# Patient Record
Sex: MALE | Race: WHITE
[De-identification: names, ages, dates, MRNs, and addresses within clinical notes are randomized per-mention and may not be internally consistent; named-entity substitution may affect disease eponyms.]

---

## 2018-08-10 ENCOUNTER — HOSPITAL ENCOUNTER (OUTPATIENT)
Dept: HOSPITAL 88 - ER | Age: 73
Setting detail: OBSERVATION
LOS: 3 days | Discharge: HOME | End: 2018-08-13
Attending: INTERNAL MEDICINE | Admitting: INTERNAL MEDICINE
Payer: MEDICARE

## 2018-08-10 VITALS — SYSTOLIC BLOOD PRESSURE: 159 MMHG | DIASTOLIC BLOOD PRESSURE: 70 MMHG

## 2018-08-10 VITALS — DIASTOLIC BLOOD PRESSURE: 70 MMHG | SYSTOLIC BLOOD PRESSURE: 159 MMHG

## 2018-08-10 VITALS — DIASTOLIC BLOOD PRESSURE: 71 MMHG | SYSTOLIC BLOOD PRESSURE: 164 MMHG

## 2018-08-10 VITALS — HEIGHT: 70 IN | BODY MASS INDEX: 2.29 KG/M2 | WEIGHT: 16 LBS

## 2018-08-10 DIAGNOSIS — Z85.3: ICD-10-CM

## 2018-08-10 DIAGNOSIS — E78.5: ICD-10-CM

## 2018-08-10 DIAGNOSIS — I25.10: ICD-10-CM

## 2018-08-10 DIAGNOSIS — I10: ICD-10-CM

## 2018-08-10 DIAGNOSIS — M51.36: ICD-10-CM

## 2018-08-10 DIAGNOSIS — R00.1: ICD-10-CM

## 2018-08-10 DIAGNOSIS — R94.31: ICD-10-CM

## 2018-08-10 DIAGNOSIS — Z85.118: ICD-10-CM

## 2018-08-10 DIAGNOSIS — R07.89: Primary | ICD-10-CM

## 2018-08-10 DIAGNOSIS — Z86.73: ICD-10-CM

## 2018-08-10 DIAGNOSIS — R53.1: ICD-10-CM

## 2018-08-10 DIAGNOSIS — R53.81: ICD-10-CM

## 2018-08-10 LAB
ALBUMIN SERPL-MCNC: 3.4 G/DL (ref 3.5–5)
ALBUMIN/GLOB SERPL: 1 {RATIO} (ref 0.8–2)
ALP SERPL-CCNC: 63 IU/L (ref 40–150)
ALT SERPL-CCNC: 18 IU/L (ref 0–55)
ANION GAP SERPL CALC-SCNC: 13.6 MMOL/L (ref 8–16)
BACTERIA URNS QL MICRO: (no result) /HPF
BASOPHILS # BLD AUTO: 0.1 10*3/UL (ref 0–0.1)
BASOPHILS NFR BLD AUTO: 1 % (ref 0–1)
BILIRUB UR QL: NEGATIVE
BUN SERPL-MCNC: 14 MG/DL (ref 7–26)
BUN/CREAT SERPL: 16 (ref 6–25)
CALCIUM SERPL-MCNC: 9.3 MG/DL (ref 8.4–10.2)
CHLORIDE SERPL-SCNC: 109 MMOL/L (ref 98–107)
CHOLEST SERPL-MCNC: 146 MD/DL (ref 0–199)
CHOLEST/HDLC SERPL: 2.5 {RATIO} (ref 3.9–4.7)
CK MB SERPL-MCNC: 1.7 NG/ML (ref 0–5)
CK MB SERPL-MCNC: 2 NG/ML (ref 0–5)
CK SERPL-CCNC: 63 IU/L (ref 30–200)
CK SERPL-CCNC: 77 IU/L (ref 30–200)
CLARITY UR: CLEAR
CO2 SERPL-SCNC: 23 MMOL/L (ref 22–29)
COLOR UR: YELLOW
DEPRECATED APTT PLAS QN: 29.4 SECONDS (ref 23.8–35.5)
DEPRECATED INR PLAS: 1.3
DEPRECATED NEUTROPHILS # BLD AUTO: 3.7 10*3/UL (ref 2.1–6.9)
DEPRECATED RBC URNS MANUAL-ACNC: (no result) /HPF (ref 0–5)
EGFRCR SERPLBLD CKD-EPI 2021: > 60 ML/MIN (ref 60–?)
EOSINOPHIL # BLD AUTO: 0.7 10*3/UL (ref 0–0.4)
EOSINOPHIL NFR BLD AUTO: 10.8 % (ref 0–6)
EPI CELLS URNS QL MICRO: (no result) /LPF
ERYTHROCYTE [DISTWIDTH] IN CORD BLOOD: 12.7 % (ref 11.7–14.4)
GLOBULIN PLAS-MCNC: 3.5 G/DL (ref 2.3–3.5)
GLUCOSE SERPLBLD-MCNC: 116 MG/DL (ref 74–118)
HCT VFR BLD AUTO: 36.3 % (ref 38.2–49.6)
HDLC SERPL-MSCNC: 58 MG/DL (ref 40–60)
HGB BLD-MCNC: 12.1 G/DL (ref 14–18)
KETONES UR QL STRIP.AUTO: NEGATIVE
LDLC SERPL CALC-MCNC: 72 MG/DL (ref 60–130)
LEUKOCYTE ESTERASE UR QL STRIP.AUTO: NEGATIVE
LYMPHOCYTES # BLD: 1.1 10*3/UL (ref 1–3.2)
LYMPHOCYTES NFR BLD AUTO: 18.4 % (ref 18–39.1)
MCH RBC QN AUTO: 32.1 PG (ref 28–32)
MCHC RBC AUTO-ENTMCNC: 33.3 G/DL (ref 31–35)
MCV RBC AUTO: 96.3 FL (ref 81–99)
MONOCYTES # BLD AUTO: 0.6 10*3/UL (ref 0.2–0.8)
MONOCYTES NFR BLD AUTO: 10.2 % (ref 4.4–11.3)
NEUTS SEG NFR BLD AUTO: 59.4 % (ref 38.7–80)
NITRITE UR QL STRIP.AUTO: NEGATIVE
PLATELET # BLD AUTO: 227 X10E3/UL (ref 140–360)
POTASSIUM SERPL-SCNC: 3.6 MMOL/L (ref 3.5–5.1)
PROT UR QL STRIP.AUTO: NEGATIVE
PROTHROMBIN TIME: 15.2 SECONDS (ref 11.9–14.5)
RBC # BLD AUTO: 3.77 X10E6/UL (ref 4.3–5.7)
SODIUM SERPL-SCNC: 142 MMOL/L (ref 136–145)
SP GR UR STRIP: 1.02 (ref 1.01–1.02)
TRIGL SERPL-MCNC: 82 MG/DL (ref 0–149)
TSH SERPL DL<=0.005 MIU/L-ACNC: 1.48 UIU/ML (ref 0.35–4.94)
UROBILINOGEN UR STRIP-MCNC: 0.2 MG/DL (ref 0.2–1)
WBC #/AREA URNS HPF: (no result) /HPF (ref 0–5)

## 2018-08-10 PROCEDURE — 99284 EMERGENCY DEPT VISIT MOD MDM: CPT

## 2018-08-10 PROCEDURE — 80053 COMPREHEN METABOLIC PANEL: CPT

## 2018-08-10 PROCEDURE — 72110 X-RAY EXAM L-2 SPINE 4/>VWS: CPT

## 2018-08-10 PROCEDURE — 81001 URINALYSIS AUTO W/SCOPE: CPT

## 2018-08-10 PROCEDURE — 71260 CT THORAX DX C+: CPT

## 2018-08-10 PROCEDURE — 82553 CREATINE MB FRACTION: CPT

## 2018-08-10 PROCEDURE — 84443 ASSAY THYROID STIM HORMONE: CPT

## 2018-08-10 PROCEDURE — 84484 ASSAY OF TROPONIN QUANT: CPT

## 2018-08-10 PROCEDURE — 80061 LIPID PANEL: CPT

## 2018-08-10 PROCEDURE — 93306 TTE W/DOPPLER COMPLETE: CPT

## 2018-08-10 PROCEDURE — 36415 COLL VENOUS BLD VENIPUNCTURE: CPT

## 2018-08-10 PROCEDURE — 93005 ELECTROCARDIOGRAM TRACING: CPT

## 2018-08-10 PROCEDURE — 71045 X-RAY EXAM CHEST 1 VIEW: CPT

## 2018-08-10 PROCEDURE — 85730 THROMBOPLASTIN TIME PARTIAL: CPT

## 2018-08-10 PROCEDURE — 85025 COMPLETE CBC W/AUTO DIFF WBC: CPT

## 2018-08-10 PROCEDURE — 85610 PROTHROMBIN TIME: CPT

## 2018-08-10 PROCEDURE — 70450 CT HEAD/BRAIN W/O DYE: CPT

## 2018-08-10 PROCEDURE — 82550 ASSAY OF CK (CPK): CPT

## 2018-08-10 RX ADMIN — GEMFIBROZIL SCH MG: 600 TABLET, FILM COATED ORAL at 16:46

## 2018-08-10 NOTE — DIAGNOSTIC IMAGING REPORT
EXAMINATION: Head CT 



HISTORY: Generalized weakness since the day before, chest and lower back pain,

history of breast cancer and lung cancer.

COMPARISON: Brain MRI on 12/23/2013

TECHNIQUE: Multidetector axial images were obtained without contrast from the

foramen magnum to the vertex . The images were reconstructed using brain and

bone algorithms.  Thin section brain images were reformatted into coronal and

sagittal planes.

Intravenous contrast: None. 

Image quality: Motion/streaking artifact limits the evaluation of the skull

base and posterior cranial fossa.



FINDINGS:



     Parenchyma: 

1.  Stable mild chronic microvascular ischemic changes.

2.  Small chronic lacunar infarct in the left caudate nucleus was not seen on

prior MRI in 12/23/2019.

3.  No mass or hemorrhage. No CT evidence of acute territorial vascular insult.



    

     Extra-axial spaces:No abnormal density. No extra-axial fluid collections 

     Brain volume: Normal for age. 

     Ventricles: No hydrocephalus or displacement.  

     Arteries: No density suggestive of thrombus. 

     Dural sinuses: No abnormal density. 

     Extra-axial spaces: No abnormal density. 

     Foramen magnum: No mass, Chiari malformation, or basilar invagination. 

     Sella: No obvious mass.  

     Paranasal/mastoid sinuses: Imaged portions unremarkable. 

     Skull/Scalp: No lytic or blastic lesions.  No fractures. 



IMPRESSION:



1.  No intracranial mass, hemorrhage vasogenic edema or mass effect.

2.  Persistent minimal chronic microvascular ischemic changes.

3.  Small chronic lacunar infarct in the left basal ganglia that was not seen

on prior MRI dated 12/23/2015.



Signed by: Dr. Sunita Herrera M.D. on 8/10/2018 11:11 AM

## 2018-08-10 NOTE — CONSULTATION
DATE OF CONSULTATION:  August 10, 2018



REQUESTING PHYSICIAN:  Dr. Barry Xiao.



HISTORY OF PRESENT ILLNESS:  Mr. Haywood is a 72-year-old white male who 

presented to the ER with history of tightness of the chest, pain in the 

back, subsequently admitted for further evaluation and treatment, history 

of past illness, history of breast cancer, for which he had surgery and 

history of inoperable metastatic adenocarcinoma of the lung, alveolar type. 

 The patient was treated with systemic chemotherapy consisting of Taxotere, 

cisplatinum approximately 3 years back.  The patient subsequently has been 

maintained on Tarceva .  The patient has done remarkably well now for 

approximately 3 to 4 years.



SOCIAL HISTORY:  History of smoking.



FAMILY HISTORY:  Noncontributory.



ALLERGIES:  Reported none.



MEDICATIONS:  Please review the EMR.



REVIEW OF SYSTEMS

HEENT:  Normal. 

CARDIAC:  History of chest pain. 

RESPIRATORY:  Inoperable multicentric lung cancer.

GI:  Normal.

:  Normal.

MUSCULOSKELETAL:  Normal. 

SKIN AND BREASTS:  History of breast cancer.



PHYSICAL EXAMINATION

GENERAL:  Moderately built male.

HEENT:  No palpable adenopathy. 

HEART:  Within normal limits. 

LUNGS:  Clear.

BREASTS:  Right breast missing.  Left breast does not reveal any masses. 

ABDOMEN:  Obese. 

RECTAL:  Deferred. 

CENTRAL NERVOUS SYSTEM:  Essentially normal.



LABORATORY DATA :  Lab investigations of interest showing hemoglobin of 

12.1, hematocrit 36.3.  White count 6100, platelets reported at 227,000.  

Chemistry shows a sodium of 142, potassium 3.6, chloride 99, CO2 23, BUN 

14, creatinine 0.8, calcium 9.3.  Bilirubin 1.1, SGOT 25, SGPT 18, alkaline 

phosphatase 63.  Total protein 6.9, albumin 3.4.  Globulin 3.5.  TSH 1.48.



IMAGING:  Consists of an x-ray of the lumbar spine, which was reported 

essentially normal except for multilevel moderate degenerative disk at 

L5-S1, L4-L5, L3-L4.



Chest x-ray is reported essentially normal; however, it has to be known 

that the patient did have lung cancer.  A CAT scan perhaps will be much 

more.  CT of the brain was done, which showed the patient to have small 

chronic lacunar infarct on the left caudate nucleus.  There is a typo here, 

it says 12/23/2019, I will alert Dr. Sunita Herrera to change this; however, 

down the row she did date it as 12/23/2015.



IMPRESSION

1. Multicentric lung cancer, clinically in complete remission.  

Pathologically unknown.

2. History of breast cancer.

3. Chest pains.





PLAN:  Is to have the cardiology, pulmonology consultation.  I will confine 

myself to hematology perhaps a CT of the chest to assess the extent of the 

disease would be indicated.



Thank you very much for allowing me to participate in the management of 

this patient.  I will confine myself to hematology oncology.









DD:  08/10/2018 15:16

DT:  08/10/2018 17:30

Job#:  [id number]

## 2018-08-10 NOTE — DIAGNOSTIC IMAGING REPORT
EXAMINATION: Lumbar spine series.



CLINICAL HISTORY: Chest pain, low back pain



COMPARISON:  None.  



DISCUSSION:  5 views of the lumbar spine are submitted for interpretation. 

Five nonrib-bearing lumbar type vertebral bodies are identified. No acute,

displaced fractures or subluxation.  Multilevel moderate degenerative disc

changes in the lumbosacral spine with associated levoscoliosis, worse at L3-L4,

L5-S1. Facet hypertrophy L4-L5 and L5-S1. Grade 1 anterolisthesis of L4 on L3. 

Vertebral body heights are preserved. Moderate intervertebral disc space

narrowing with vacuum phenomenon at L3-L4 and L5-S1. Bilateral oblique views

show no spondylolysis. Degenerative changes in bilateral sacroiliac joints.

Marked atherosclerotic calcification of the abdominal aorta.

Ill-defined somewhat linear calcific densities projecting in the left upper

quadrant left 12th rib as well as right upper quadrant over the L1 right

transverse process are likely vascular in nature.



IMPRESSION: 



1. No acute abnormalities. Multilevel degenerative disc and joint disease with

associated levoscoliosis





The staff physician below has personally reviewed this exam on the date of

dictation.











Signed by: Dr. Clifton Lauren M.D. on 8/10/2018 12:34 PM

## 2018-08-10 NOTE — DIAGNOSTIC IMAGING REPORT
Examination: Single AP view of the chest.



COMPARISON: Chest 2 views 12/23/2013



INDICATION: Chest pain

    

IMPRESSION: Exam limited by patient rotation.

 

1.  Lines and Tubes: None

2.  Lungs are well-inflated. No nodules, masses or consolidation. Linear

opacity in the left lower lung, which may represent subsegmental atelectasis or

scarring.

3.  Cardiomediastinal silhouette is normal.   Pulmonary vasculature is normal.

4.  Metallic clips project over the right axillary region. No acute bony

abnormalities.



Signed by: Dr. Clifton Lauren M.D. on 8/10/2018 12:40 PM

## 2018-08-10 NOTE — CONSULTATION
DATE OF CONSULTATION:  August 10, 2018 



REASON FOR CONSULTATION:  Chest pain.



HISTORY:  This is a 72-year-old male who has a history of hypertension, 

hyperlipidemia, history of lung cancer and breast cancer, being maintained 

on Tarceva.  Patient presents to [facility] ER with complaints 

of chest pain that started about 3:30 this morning.  Therefore cardiology 

was consulted.  Patient seen in ER, room 10, with family member at bedside. 

 Reports he has been having some chest discomfort last night.  Reports he 

has chest tightness across his chest with lower back pain.  Denies any 

other symptoms with discomfort. Reports chest tightness is constant and 

reproducible with palpation.  Currently in no acute distress however does 

complain of bilateral chest wall pain with palpation. 



PAST MEDICAL HISTORY:  Hypertension, hyperlipidemia, history of lung 

cancer.  Reports chemotherapy times four and is on Tarceva.  Also history 

of right breast cancer.



SURGICAL HISTORY:  Right breast removal also with lymph node dissection.  

Also reports left inguinal hernia repair and also bilateral cataract 

surgery.



SOCIAL HISTORY:  He is a retired machine shop owner.  Denies any alcohol 

use.  Denies any tobacco use.



FAMILY HISTORY:  Reports sister apparently  from stroke.  Also 

father  from stroke.  Mother  from old age however does 

report did have heart surgery in the past but he does not know any details.



HOME MEDICATIONS:  Include verapamil 240 mg once a day, Tarceva 150 mg once 

a day, gemfibrozil 600 mg b.i.d., metoprolol 50 mg daily.



ALLERGIES:  NO KNOWN ALLERGIES.

REVIEW OF SYSTEMS

GENERAL:  Denies any weight changes.  Positive for fatigue and weakness.  

Denies any fever or chills.  Denies sweats.

SKIN:  No rashes or bruises noted.

HEENT:  Denies any headaches, any trauma, any nausea or vomiting, vision 

changes, any blurred vision, double vision, tinnitus, vertigo, earaches, 

rhinorrhea, stuffiness, sneezing, epistaxis, sore throat, hoarseness, 

swollen neck.

CARDIAC:  Chest tightness as above.  Denies any palpitations.  Positive for 

dyspnea on exertion.  Denies any orthopnea or PND.  Positive for lower 

extremity edema.

RESPIRATORY:  Positive for some shortness of breath on exertion.  Positive 

for cough.  Denies any hemoptysis.

GI:  Poor appetite reported.  No nausea or vomiting.  Denies any diarrhea 

or constipation, any hematemesis, melena or hematochezia, abdominal pain.

URINARY:  Positive for frequency, urgency.  Denies any hematuria.

VASCULAR:  Positive for lower extremity edema.

MUSCULOSKELETAL:  Positive for generalized muscle weakness, joint pains, 

and positive for low back pain.

HEMATOLOGY:  Denies any anemia, easy bruising, or bleeding.

ENDOCRINE:  Denies any heat or cold intolerance, polyuria, polydipsia or 

polyphagia.



PHYSICAL EXAMINATION

VITALS SIGNS:  Height 70 inches, weight 166 pounds.  Temperature 97.9, 

pulse 53, respiratory rate 20, blood pressure 145/91, pulse ox 100% on room 

air.

SKIN:  No rashes or bruises.

HEENT:  Normocephalic.  Extraocular motor intact.  Pupils equal and 

reactive.  Oral mucosa pink.

NECK:  No JVD.  No thyromegaly noted.

HEART:  Regular rate.  A soft systolic murmur heard in the right upper 

sternal border.

LUNGS:  Bilateral breath sounds.  Clear to auscultation.  Does have a scar 

under the right breast.

ABDOMEN:  Soft, nontender, nondistended.  No organomegaly noted.

VASCULAR:  +2 bilateral radial pulses, +1 DP/PT pulses.

MUSCULOSKELETAL:  +1 bilateral lower extremity edema, left greater than 

right.



LABS:  Sodium 42, potassium 3.9, chloride 109, BUN 14, creatinine 0.8.  

Troponin 0.009.  TSH 1.4.  White count 6, hemoglobin 12, hematocrit 36, 

platelets 227.



Lumbar spine showing degenerative disk disease.



Chest x-ray:  No acute abnormalities.



CT of the brain showing small chronic lacunar infarct.



EKG:  Sinus yun with PACs with nonspecific ST changes.



ASSESSMENT AND PLAN 

1. Chest pain.

2. Hypertension.

3. Hyperlipidemia.

4. History of lung cancer being maintained on Tarceva.

5. History of breast cancer.



PLAN

1. Patient presents with chest tightness reproducible with palpation.  

Negative cardiac enzymes thus far.  Will go ahead and cycle enzymes and 

continue to monitor patient's symptoms.  Will go ahead and start patient 

on aspirin.  Go ahead and start patient's gemfibrozil.  Will hold beta 

blocker and calcium channel blocker for now since patient is 

bradycardic.

2. We will obtain echo to evaluate heart function and structure.

3. Continue telemonitoring.





Thank you very much for this consult.  Will follow patient and adjust 

cardiac therapy as course dictates.

Seen and examined

Differential diagnoses are discussed and explained

Agree with note

Dictated by:  Chilo Gtz NP.





DD:  08/10/2018 15:38

DT:  08/10/2018 16:51

Job#:  [id number]







SUE

## 2018-08-11 VITALS — SYSTOLIC BLOOD PRESSURE: 162 MMHG | DIASTOLIC BLOOD PRESSURE: 70 MMHG

## 2018-08-11 VITALS — SYSTOLIC BLOOD PRESSURE: 160 MMHG | DIASTOLIC BLOOD PRESSURE: 68 MMHG

## 2018-08-11 VITALS — SYSTOLIC BLOOD PRESSURE: 136 MMHG | DIASTOLIC BLOOD PRESSURE: 62 MMHG

## 2018-08-11 VITALS — DIASTOLIC BLOOD PRESSURE: 75 MMHG | SYSTOLIC BLOOD PRESSURE: 140 MMHG

## 2018-08-11 VITALS — DIASTOLIC BLOOD PRESSURE: 70 MMHG | SYSTOLIC BLOOD PRESSURE: 158 MMHG

## 2018-08-11 LAB
CHOLEST SERPL-MCNC: 132 MD/DL (ref 0–199)
CHOLEST/HDLC SERPL: 2.9 {RATIO} (ref 3.9–4.7)
CK MB SERPL-MCNC: 2.1 NG/ML (ref 0–5)
CK SERPL-CCNC: 95 IU/L (ref 30–200)
HDLC SERPL-MSCNC: 46 MG/DL (ref 40–60)
LDLC SERPL CALC-MCNC: 64 MG/DL (ref 60–130)
TRIGL SERPL-MCNC: 112 MG/DL (ref 0–149)

## 2018-08-11 RX ADMIN — GEMFIBROZIL SCH MG: 600 TABLET, FILM COATED ORAL at 16:53

## 2018-08-11 RX ADMIN — GEMFIBROZIL SCH MG: 600 TABLET, FILM COATED ORAL at 09:49

## 2018-08-11 RX ADMIN — Medication SCH MG: at 20:51

## 2018-08-11 RX ADMIN — Medication SCH MG: at 09:49

## 2018-08-11 RX ADMIN — HYDROCODONE BITARTRATE AND ACETAMINOPHEN PRN EA: 7.5; 325 TABLET ORAL at 20:57

## 2018-08-11 RX ADMIN — HYDROCODONE BITARTRATE AND ACETAMINOPHEN PRN EA: 7.5; 325 TABLET ORAL at 10:39

## 2018-08-11 RX ADMIN — FAMOTIDINE SCH MG: 20 TABLET, FILM COATED ORAL at 20:52

## 2018-08-11 NOTE — HISTORY AND PHYSICAL
PRIMARY ONCOLOGIST:  Dr. Hill.



This is hospital coverage for Nelson Lazo MD, admitting doctor.



HISTORY OF PRESENT ILLNESS:  Mr. Haywood is a pleasant 72-year-old 

gentleman with chest tightness.  The patient had pressure-like quality to 

the tightness.  The patient with small amount of sweatiness.  As there was 

a pattern of constant worsening, he came to the emergency room.  The 

patient with initial troponin 0.009.  EKG with sinus bradycardia and PACs 

with nonspecific ST changes.  At this point, it was elected for monitoring 

in observation unit.  



I discussed with Dr. Hill regarding the patient's history of lung 

cancer and structural lung disease.  The patient intermittently will have 

some coughing.  He was known to have nonoperable metastatic adenocarcinoma 

of the lung alveolar type.  He was given systemic chemotherapy with 

Taxotere, cisplatin about 3 years ago.  The patient developed care of his 

malignancy on Tarceva which has done remarkably well.  



PAST MEDICAL HISTORY

1. Lung cancer, status post chemotherapy.

2. Breast cancer, status post right mastectomy.

3. Hypertension.

4. Coronary artery disease was reported.

5. Lower back pain.



MEDICATIONS:  Medication list reviewed per electronic record.



ALLERGIES:  NO KNOWN DRUG ALLERGIES.



SOCIAL HISTORY:  He is a retired machine shop owner.  No alcohol.  There is 

a history of smoking in the past.  No drugs.



FAMILY HISTORY:  Noncontributory here. 



REVIEW OF SYSTEMS

GENERAL:  There is no emaciation.  

HEENT:  No dry mouth.

ENDOCRINE:  No thyroid disease.

LUNGS:  No asthma.

CARDIAC:  No coronary artery bypass grafting in the past.  

GI:  No constipation.

:  No blood in the urine.

DERMATOLOGIC:  No rash.  

NEUROLOGIC:  No seizures.  

IMMUNOLOGIC:  No connective tissue disease known.



OBJECTIVE

VITAL SIGNS:  Afebrile, vital signs noted per electronic record.

GENERAL:  No acute distress.  Alert and calm.

HEENT:  Normocephalic, atraumatic.  Throat midline.

NECK:  Supple.  

LUNGS:  Bilateral air entry, few rhonchi.

CARDIOVASCULAR:  S1, S2.  No murmurs, rubs or gallops.

ABDOMEN:  Soft, nontender.

EXTREMITIES:  No clubbing.  No cyanosis.  No significant edema.

INTEGUMENT:  No rash or purpura.



LABS:  Hematocrit 36, white count 6, platelets 237,000.  Potassium 3.6, 

creatinine 0.9, albumin is 3.4.  TSH 1.5.  LDL 72.



IMPRESSION

1. Atypical chest pain.

2. Reports of coronary artery disease.

3. History of cerebrovascular accident.

4. Hypertension.

5. History of lower back pain.



PLAN:  Admit to observation.  Get cardiology consult.  If it looks like 

cardiac enzymes are unremarkable, we will investigate other etiologies and 

therefore we will consult oncology.  Continue to follow along closely with 

aspirin. 



Thank you very much Dr. Hill for allowing me the chance to participate 

in the care of Mr. Haywood.  Do not hesitate to contact me if I can help 

in any way.









DD:  08/11/2018 02:59

DT:  08/11/2018 03:17

Job#:  [id number] [id number]

## 2018-08-12 VITALS — SYSTOLIC BLOOD PRESSURE: 133 MMHG | DIASTOLIC BLOOD PRESSURE: 61 MMHG

## 2018-08-12 VITALS — DIASTOLIC BLOOD PRESSURE: 55 MMHG | SYSTOLIC BLOOD PRESSURE: 114 MMHG

## 2018-08-12 VITALS — SYSTOLIC BLOOD PRESSURE: 119 MMHG | DIASTOLIC BLOOD PRESSURE: 56 MMHG

## 2018-08-12 VITALS — DIASTOLIC BLOOD PRESSURE: 63 MMHG | SYSTOLIC BLOOD PRESSURE: 138 MMHG

## 2018-08-12 VITALS — DIASTOLIC BLOOD PRESSURE: 61 MMHG | SYSTOLIC BLOOD PRESSURE: 133 MMHG

## 2018-08-12 VITALS — SYSTOLIC BLOOD PRESSURE: 119 MMHG | DIASTOLIC BLOOD PRESSURE: 58 MMHG

## 2018-08-12 VITALS — DIASTOLIC BLOOD PRESSURE: 67 MMHG | SYSTOLIC BLOOD PRESSURE: 155 MMHG

## 2018-08-12 VITALS — SYSTOLIC BLOOD PRESSURE: 138 MMHG | DIASTOLIC BLOOD PRESSURE: 63 MMHG

## 2018-08-12 RX ADMIN — FAMOTIDINE SCH MG: 20 TABLET, FILM COATED ORAL at 20:36

## 2018-08-12 RX ADMIN — Medication SCH MG: at 08:45

## 2018-08-12 RX ADMIN — HYDROCODONE BITARTRATE AND ACETAMINOPHEN PRN EA: 7.5; 325 TABLET ORAL at 22:05

## 2018-08-12 RX ADMIN — GEMFIBROZIL SCH MG: 600 TABLET, FILM COATED ORAL at 08:45

## 2018-08-12 RX ADMIN — GEMFIBROZIL SCH MG: 600 TABLET, FILM COATED ORAL at 16:45

## 2018-08-12 RX ADMIN — Medication SCH MG: at 20:36

## 2018-08-12 NOTE — DIAGNOSTIC IMAGING REPORT
EXAM: CT Chest WITH contrast 8/12/2018 6:00 AM

INDICATION:      

\S\chest tightness, history of lung cancer; PE PROTOCOL

\S\[id number]

\S\0530  

COMPARISON: Chest x-ray on 8/10/2018, chest CT dated 12/21/2013



TECHNIQUE:

Chest was scanned utilizing a multidetector helical scanner from the lung apex

through the level of the adrenal glands without administration of IV contrast.

Coronal and sagittal reformations were obtained. PE protocol was performed.

     IV CONTRAST: 100 mL of Isovue-370



COMPLICATIONS: None



RADIATION DOSE: 

     Total DLP: 625.55 mGy*cm

     Estimated effective dose: (DLP x 0.014 x size factor) mSv

     CTDIvol has been reviewed. It is below the limits set by the Radiation

Protocol Committee (RPC).



FINDINGS:



LINES/ TUBES: None.



LUNGS AND AIRWAYS:  No evidence of pulmonary embolism to the segmental level. 7

mm left upper lobe nodule (series 3, image 28). Spiculated 8 mm left upper lobe

nodule (3/37). 5 mm right middle lobe nodule (3/83). 3 mm subpleural right

upper lobe nodule (3/46). Dependent atelectasis. Airways are normal.



PLEURA: The pleural spaces are clear.



HEART AND MEDIASTINUM: The thyroid gland is normal.  No mediastinal or hilar

lymphadenopathy. Enlarged right axillary and subpectoralis lymph nodes,

measuring up to 1.3 cm (series 2, images 36, 26, and 24). The heart is normal

in size.. There is no pericardial effusion.  Moderate to severe atherosclerotic

calcification of aorta and coronary arteries. Main pulmonary artery measures

2.5 cm.



UPPER ABDOMEN: Cholelithiasis.



BONES: Mild thoracic spine scoliosis and degenerative changes.



SOFT TISSUES: Evidence of right mastectomy.



IMPRESSION: 

No evidence of pulmonary embolism.

Lung nodules as above, concerning for recurrent disease.

Right axillary lymphadenopathy.







Signed by: Dr. Alex Neal MD on 8/12/2018 9:36 AM

## 2018-08-13 VITALS — DIASTOLIC BLOOD PRESSURE: 54 MMHG | SYSTOLIC BLOOD PRESSURE: 116 MMHG

## 2018-08-13 VITALS — DIASTOLIC BLOOD PRESSURE: 65 MMHG | SYSTOLIC BLOOD PRESSURE: 142 MMHG

## 2018-08-13 VITALS — DIASTOLIC BLOOD PRESSURE: 62 MMHG | SYSTOLIC BLOOD PRESSURE: 136 MMHG

## 2018-08-13 VITALS — SYSTOLIC BLOOD PRESSURE: 156 MMHG | DIASTOLIC BLOOD PRESSURE: 71 MMHG

## 2018-08-13 RX ADMIN — GEMFIBROZIL SCH MG: 600 TABLET, FILM COATED ORAL at 08:30

## 2018-08-13 RX ADMIN — HYDROCODONE BITARTRATE AND ACETAMINOPHEN PRN EA: 7.5; 325 TABLET ORAL at 13:27

## 2018-08-13 RX ADMIN — GEMFIBROZIL SCH MG: 600 TABLET, FILM COATED ORAL at 17:51

## 2018-08-13 RX ADMIN — Medication SCH MG: at 08:30

## 2018-08-13 NOTE — DISCHARGE SUMMARY
PRIMARY CARE DOCTOR:  Dr. Priyank Serrato.



FINAL DIAGNOSIS:  Had a likely symptomatic iatrogenic bradycardia. 



SECONDARY DIAGNOSES 

1. Lung cancer. 

2. Breast cancer. 



CONSULTANTS

1. Dr. Hill, oncology.

2. Dr. Yu, cardiology.



PROCEDURES/STUDIES PERFORMED

1. Echocardiogram.

2. Chest CT.  

3. Head CT. 



HISTORY:  Per H\T\P.  



HOSPITAL COURSE:  Patient was admitted.  Initially was found to have heart 

rate as low as 40s.  Patient also had chest tightness.  His troponins were 

negative times 3.  Therefore, no evidence of acute myocardial infarction.  

Patient was evaluated by cardiology.  His metoprolol and verapamil were 

discontinued.  Patient improved.  His blood pressure remained acceptable 

even without these medications.  Patient was seen and examined today.  





CONDITION ON DISCHARGE:  Stable.



DISCHARGE MEDICATIONS:  Please see medication reconciliation form. 

 



 _________________________________

MICHAEL RAMIREZ M.D.



DD:  08/13/2018 10:31

DT:  08/13/2018 10:35

Job#:  [id number]





cc:DR. PRIYANK SERRATO

## 2018-12-18 ENCOUNTER — HOSPITAL ENCOUNTER (INPATIENT)
Dept: HOSPITAL 88 - ER | Age: 73
LOS: 5 days | Discharge: HOME | DRG: 354 | End: 2018-12-23
Attending: INTERNAL MEDICINE | Admitting: INTERNAL MEDICINE
Payer: MEDICARE

## 2018-12-18 VITALS — BODY MASS INDEX: 26.28 KG/M2 | WEIGHT: 183.56 LBS | HEIGHT: 70 IN

## 2018-12-18 DIAGNOSIS — K43.6: Primary | ICD-10-CM

## 2018-12-18 DIAGNOSIS — Z85.118: ICD-10-CM

## 2018-12-18 DIAGNOSIS — Z87.891: ICD-10-CM

## 2018-12-18 DIAGNOSIS — Z79.52: ICD-10-CM

## 2018-12-18 DIAGNOSIS — I25.10: ICD-10-CM

## 2018-12-18 DIAGNOSIS — Z79.82: ICD-10-CM

## 2018-12-18 DIAGNOSIS — K80.00: ICD-10-CM

## 2018-12-18 DIAGNOSIS — R00.0: ICD-10-CM

## 2018-12-18 DIAGNOSIS — J44.9: ICD-10-CM

## 2018-12-18 DIAGNOSIS — I10: ICD-10-CM

## 2018-12-18 DIAGNOSIS — Z85.3: ICD-10-CM

## 2018-12-18 DIAGNOSIS — I71.4: ICD-10-CM

## 2018-12-18 LAB
ALBUMIN SERPL-MCNC: 4.1 G/DL (ref 3.5–5)
ALBUMIN/GLOB SERPL: 1.2 {RATIO} (ref 0.8–2)
ALP SERPL-CCNC: 63 IU/L (ref 40–150)
ALT SERPL-CCNC: 33 IU/L (ref 0–55)
AMYLASE SERPL-CCNC: 108 U/L (ref 25–125)
ANION GAP SERPL CALC-SCNC: 14.9 MMOL/L (ref 8–16)
BASOPHILS # BLD AUTO: 0.1 10*3/UL (ref 0–0.1)
BASOPHILS NFR BLD AUTO: 0.4 % (ref 0–1)
BUN SERPL-MCNC: 12 MG/DL (ref 7–26)
BUN/CREAT SERPL: 10 (ref 6–25)
CALCIUM SERPL-MCNC: 10.1 MG/DL (ref 8.4–10.2)
CHLORIDE SERPL-SCNC: 102 MMOL/L (ref 98–107)
CO2 SERPL-SCNC: 26 MMOL/L (ref 22–29)
DEPRECATED NEUTROPHILS # BLD AUTO: 16.2 10*3/UL (ref 2.1–6.9)
EGFRCR SERPLBLD CKD-EPI 2021: > 60 ML/MIN (ref 60–?)
EOSINOPHIL # BLD AUTO: 0.2 10*3/UL (ref 0–0.4)
EOSINOPHIL NFR BLD AUTO: 0.9 % (ref 0–6)
ERYTHROCYTE [DISTWIDTH] IN CORD BLOOD: 12.3 % (ref 11.7–14.4)
GLOBULIN PLAS-MCNC: 3.4 G/DL (ref 2.3–3.5)
GLUCOSE SERPLBLD-MCNC: 117 MG/DL (ref 74–118)
HCT VFR BLD AUTO: 43.2 % (ref 38.2–49.6)
HGB BLD-MCNC: 14.1 G/DL (ref 14–18)
LIPASE SERPL-CCNC: 15 U/L (ref 8–78)
LYMPHOCYTES # BLD: 0.8 10*3/UL (ref 1–3.2)
LYMPHOCYTES NFR BLD AUTO: 4.5 % (ref 18–39.1)
MCH RBC QN AUTO: 31.6 PG (ref 28–32)
MCHC RBC AUTO-ENTMCNC: 32.6 G/DL (ref 31–35)
MCV RBC AUTO: 96.9 FL (ref 81–99)
MONOCYTES # BLD AUTO: 0.8 10*3/UL (ref 0.2–0.8)
MONOCYTES NFR BLD AUTO: 4.2 % (ref 4.4–11.3)
NEUTS SEG NFR BLD AUTO: 89.4 % (ref 38.7–80)
PLATELET # BLD AUTO: 259 X10E3/UL (ref 140–360)
POTASSIUM SERPL-SCNC: 3.9 MMOL/L (ref 3.5–5.1)
RBC # BLD AUTO: 4.46 X10E6/UL (ref 4.3–5.7)
SODIUM SERPL-SCNC: 139 MMOL/L (ref 136–145)

## 2018-12-18 PROCEDURE — 0D9670Z DRAINAGE OF STOMACH WITH DRAINAGE DEVICE, VIA NATURAL OR ARTIFICIAL OPENING: ICD-10-PCS

## 2018-12-18 PROCEDURE — 88302 TISSUE EXAM BY PATHOLOGIST: CPT

## 2018-12-18 PROCEDURE — 96367 TX/PROPH/DG ADDL SEQ IV INF: CPT

## 2018-12-18 PROCEDURE — 78227 HEPATOBIL SYST IMAGE W/DRUG: CPT

## 2018-12-18 PROCEDURE — 74177 CT ABD & PELVIS W/CONTRAST: CPT

## 2018-12-18 PROCEDURE — 85730 THROMBOPLASTIN TIME PARTIAL: CPT

## 2018-12-18 PROCEDURE — 85610 PROTHROMBIN TIME: CPT

## 2018-12-18 PROCEDURE — 36415 COLL VENOUS BLD VENIPUNCTURE: CPT

## 2018-12-18 PROCEDURE — 74019 RADEX ABDOMEN 2 VIEWS: CPT

## 2018-12-18 PROCEDURE — 99284 EMERGENCY DEPT VISIT MOD MDM: CPT

## 2018-12-18 PROCEDURE — 85025 COMPLETE CBC W/AUTO DIFF WBC: CPT

## 2018-12-18 PROCEDURE — 80053 COMPREHEN METABOLIC PANEL: CPT

## 2018-12-18 PROCEDURE — 81001 URINALYSIS AUTO W/SCOPE: CPT

## 2018-12-18 PROCEDURE — 93005 ELECTROCARDIOGRAM TRACING: CPT

## 2018-12-18 PROCEDURE — 88304 TISSUE EXAM BY PATHOLOGIST: CPT

## 2018-12-18 PROCEDURE — 83690 ASSAY OF LIPASE: CPT

## 2018-12-18 PROCEDURE — 96376 TX/PRO/DX INJ SAME DRUG ADON: CPT

## 2018-12-18 PROCEDURE — 82150 ASSAY OF AMYLASE: CPT

## 2018-12-18 PROCEDURE — 94640 AIRWAY INHALATION TREATMENT: CPT

## 2018-12-18 RX ADMIN — SODIUM CHLORIDE PRN MG: 900 INJECTION INTRAVENOUS at 23:43

## 2018-12-18 NOTE — NUR
15F NGT PLACED TO R NARE, PLACEMENT VERIFIED VIA AIR BOLUS BY 2 RNS.  PLACED TO 
LIWS MODERATE AMT OF BROWNISH DRAINAGE NOTED

## 2018-12-18 NOTE — DIAGNOSTIC IMAGING REPORT
EXAM: CT ABDOMEN/PELVIS W

DATE: 12/18/2018 9:00 PM  

INDICATION:  Abdominal pain

COMPARISON:  None 

TECHNIQUE: The abdomen and pelvis were scanned using a multidetector helical

scanner. Coronal and sagittal reformations were obtained.  CT low dose

techniques were utilized, as applicable.

IV Contrast:  100 ml Isovue 300/370



FINDINGS:

LOWER THORAX: Left basilar atelectasis/scar. Several small pulmonary nodules,

stable from recent chest CT for example 5 mm in the right lower lobe



LIVER/BILIARY:  No masses.  No ductal dilatation.



GALLBLADDER: Mildly distended gallbladder containing a 1.9 cm stone.

SPLEEN: Unremarkable

PANCREAS: Unremarkable



ADRENALS: No nodules

KIDNEYS: No suspicious renal masses.  No hydronephrosis.     

GI TRACT: Dilated fluid-filled small bowel loops in the central abdomen and

right lower quadrant with transition point as the bowel extends anterior and

superior to the liver (Chilaiditi positioning, image 37, 36).



VESSELS: Severe atherosclerotic change with multiple areas of adherent neural

thrombus/noncalcified plaque. Aneurysms of the suprarenal and infrarenal

abdominal aorta is up to 3.2 cm and 3 cm, respectively

PERITONEUM/RETROPERITONEUM: No free air. Mild free fluid and mesenteric edema.

LYMPH NODES: No lymphadenopathy



REPRODUCTIVE ORGANS/BLADDER: Unremarkable



SOFT TISSUES: Fat-containing ventral hernia with minimal stranding.

BONES: Multilevel degenerative changes and curvature of the spine.



IMPRESSION:

Small bowel obstruction, with transition point adjacent to the liver

(Chilaiditi's).



Signed by: Dr Lidia Madden MD on 12/18/2018 10:49 PM

## 2018-12-19 VITALS — SYSTOLIC BLOOD PRESSURE: 123 MMHG | DIASTOLIC BLOOD PRESSURE: 59 MMHG

## 2018-12-19 VITALS — DIASTOLIC BLOOD PRESSURE: 80 MMHG | SYSTOLIC BLOOD PRESSURE: 160 MMHG

## 2018-12-19 VITALS — DIASTOLIC BLOOD PRESSURE: 77 MMHG | SYSTOLIC BLOOD PRESSURE: 179 MMHG

## 2018-12-19 VITALS — SYSTOLIC BLOOD PRESSURE: 144 MMHG | DIASTOLIC BLOOD PRESSURE: 70 MMHG

## 2018-12-19 VITALS — SYSTOLIC BLOOD PRESSURE: 140 MMHG | DIASTOLIC BLOOD PRESSURE: 67 MMHG

## 2018-12-19 VITALS — SYSTOLIC BLOOD PRESSURE: 160 MMHG | DIASTOLIC BLOOD PRESSURE: 80 MMHG

## 2018-12-19 VITALS — DIASTOLIC BLOOD PRESSURE: 67 MMHG | SYSTOLIC BLOOD PRESSURE: 140 MMHG

## 2018-12-19 VITALS — SYSTOLIC BLOOD PRESSURE: 155 MMHG | DIASTOLIC BLOOD PRESSURE: 71 MMHG

## 2018-12-19 VITALS — SYSTOLIC BLOOD PRESSURE: 148 MMHG | DIASTOLIC BLOOD PRESSURE: 65 MMHG

## 2018-12-19 VITALS — DIASTOLIC BLOOD PRESSURE: 58 MMHG | SYSTOLIC BLOOD PRESSURE: 127 MMHG

## 2018-12-19 VITALS — DIASTOLIC BLOOD PRESSURE: 65 MMHG | SYSTOLIC BLOOD PRESSURE: 148 MMHG

## 2018-12-19 LAB
ALBUMIN SERPL-MCNC: 3.6 G/DL (ref 3.5–5)
ALBUMIN/GLOB SERPL: 1.2 {RATIO} (ref 0.8–2)
ALP SERPL-CCNC: 53 IU/L (ref 40–150)
ALT SERPL-CCNC: 32 IU/L (ref 0–55)
ANION GAP SERPL CALC-SCNC: 12.8 MMOL/L (ref 8–16)
BACTERIA URNS QL MICRO: (no result) /HPF
BASOPHILS # BLD AUTO: 0 10*3/UL (ref 0–0.1)
BASOPHILS NFR BLD AUTO: 0.4 % (ref 0–1)
BILIRUB UR QL: NEGATIVE
BUN SERPL-MCNC: 13 MG/DL (ref 7–26)
BUN/CREAT SERPL: 12 (ref 6–25)
CALCIUM SERPL-MCNC: 9 MG/DL (ref 8.4–10.2)
CHLORIDE SERPL-SCNC: 104 MMOL/L (ref 98–107)
CLARITY UR: CLEAR
CO2 SERPL-SCNC: 28 MMOL/L (ref 22–29)
COLOR UR: YELLOW
DEPRECATED APTT PLAS QN: 27.3 SECONDS (ref 23.8–35.5)
DEPRECATED INR PLAS: 0.95
DEPRECATED NEUTROPHILS # BLD AUTO: 8 10*3/UL (ref 2.1–6.9)
DEPRECATED RBC URNS MANUAL-ACNC: (no result) /HPF (ref 0–5)
EGFRCR SERPLBLD CKD-EPI 2021: > 60 ML/MIN (ref 60–?)
EOSINOPHIL # BLD AUTO: 0.3 10*3/UL (ref 0–0.4)
EOSINOPHIL NFR BLD AUTO: 2.5 % (ref 0–6)
EPI CELLS URNS QL MICRO: (no result) /LPF
ERYTHROCYTE [DISTWIDTH] IN CORD BLOOD: 12.7 % (ref 11.7–14.4)
GLOBULIN PLAS-MCNC: 3.1 G/DL (ref 2.3–3.5)
GLUCOSE SERPLBLD-MCNC: 100 MG/DL (ref 74–118)
HCT VFR BLD AUTO: 40.6 % (ref 38.2–49.6)
HGB BLD-MCNC: 12.9 G/DL (ref 14–18)
KETONES UR QL STRIP.AUTO: NEGATIVE
LEUKOCYTE ESTERASE UR QL STRIP.AUTO: NEGATIVE
LYMPHOCYTES # BLD: 1.4 10*3/UL (ref 1–3.2)
LYMPHOCYTES NFR BLD AUTO: 13.3 % (ref 18–39.1)
MCH RBC QN AUTO: 31.2 PG (ref 28–32)
MCHC RBC AUTO-ENTMCNC: 31.8 G/DL (ref 31–35)
MCV RBC AUTO: 98.3 FL (ref 81–99)
MONOCYTES # BLD AUTO: 0.8 10*3/UL (ref 0.2–0.8)
MONOCYTES NFR BLD AUTO: 7.4 % (ref 4.4–11.3)
NEUTS SEG NFR BLD AUTO: 75.9 % (ref 38.7–80)
NITRITE UR QL STRIP.AUTO: NEGATIVE
PLATELET # BLD AUTO: 242 X10E3/UL (ref 140–360)
POTASSIUM SERPL-SCNC: 4.8 MMOL/L (ref 3.5–5.1)
PROT UR QL STRIP.AUTO: NEGATIVE
PROTHROMBIN TIME: 13.5 SECONDS (ref 11.9–14.5)
RBC # BLD AUTO: 4.13 X10E6/UL (ref 4.3–5.7)
SODIUM SERPL-SCNC: 140 MMOL/L (ref 136–145)
SP GR UR STRIP: 1.01 (ref 1.01–1.02)
UROBILINOGEN UR STRIP-MCNC: 0.2 MG/DL (ref 0.2–1)

## 2018-12-19 RX ADMIN — SODIUM CHLORIDE SCH MLS/HR: 9 INJECTION, SOLUTION INTRAVENOUS at 01:12

## 2018-12-19 RX ADMIN — FAMOTIDINE SCH MG: 20 TABLET, FILM COATED ORAL at 22:29

## 2018-12-19 RX ADMIN — HYDROMORPHONE HYDROCHLORIDE PRN MG: 2 INJECTION INTRAMUSCULAR; INTRAVENOUS; SUBCUTANEOUS at 14:52

## 2018-12-19 RX ADMIN — METRONIDAZOLE SCH MG: 500 INJECTION, SOLUTION INTRAVENOUS at 12:23

## 2018-12-19 RX ADMIN — METHYLPREDNISOLONE SODIUM SUCCINATE SCH MG: 40 INJECTION, POWDER, LYOPHILIZED, FOR SOLUTION INTRAMUSCULAR; INTRAVENOUS at 22:29

## 2018-12-19 RX ADMIN — BISACODYL SCH MG: 10 SUPPOSITORY RECTAL at 21:00

## 2018-12-19 RX ADMIN — SODIUM CHLORIDE SCH MLS/HR: 9 INJECTION, SOLUTION INTRAVENOUS at 12:23

## 2018-12-19 RX ADMIN — HYDROMORPHONE HYDROCHLORIDE PRN MG: 2 INJECTION INTRAMUSCULAR; INTRAVENOUS; SUBCUTANEOUS at 19:55

## 2018-12-19 RX ADMIN — HYDROMORPHONE HYDROCHLORIDE PRN MG: 2 INJECTION INTRAMUSCULAR; INTRAVENOUS; SUBCUTANEOUS at 04:32

## 2018-12-19 RX ADMIN — TAZOBACTAM SODIUM AND PIPERACILLIN SODIUM SCH GM: 375; 3 INJECTION, SOLUTION INTRAVENOUS at 22:29

## 2018-12-19 RX ADMIN — Medication PRN ML: at 12:23

## 2018-12-19 RX ADMIN — TAZOBACTAM SODIUM AND PIPERACILLIN SODIUM SCH GM: 375; 3 INJECTION, SOLUTION INTRAVENOUS at 06:05

## 2018-12-19 RX ADMIN — HYDROMORPHONE HYDROCHLORIDE PRN MG: 2 INJECTION INTRAMUSCULAR; INTRAVENOUS; SUBCUTANEOUS at 08:40

## 2018-12-19 RX ADMIN — Medication SCH MG: at 22:29

## 2018-12-19 RX ADMIN — METHYLPREDNISOLONE SODIUM SUCCINATE SCH MG: 40 INJECTION, POWDER, LYOPHILIZED, FOR SOLUTION INTRAMUSCULAR; INTRAVENOUS at 14:52

## 2018-12-19 RX ADMIN — METRONIDAZOLE SCH MG: 500 INJECTION, SOLUTION INTRAVENOUS at 05:15

## 2018-12-19 RX ADMIN — TAZOBACTAM SODIUM AND PIPERACILLIN SODIUM SCH GM: 375; 3 INJECTION, SOLUTION INTRAVENOUS at 14:52

## 2018-12-19 RX ADMIN — HYDROMORPHONE HYDROCHLORIDE PRN MG: 2 INJECTION INTRAMUSCULAR; INTRAVENOUS; SUBCUTANEOUS at 00:30

## 2018-12-19 RX ADMIN — METRONIDAZOLE SCH MG: 500 INJECTION, SOLUTION INTRAVENOUS at 18:12

## 2018-12-19 RX ADMIN — Medication PRN ML: at 15:00

## 2018-12-19 RX ADMIN — METRONIDAZOLE SCH MG: 500 INJECTION, SOLUTION INTRAVENOUS at 00:00

## 2018-12-19 NOTE — NUR
Patient states "Can I have half of pain medication right now, and half later. " Educated on 
IV dilaudid. Notified of doctor's orders. Patient voiced understanding.

## 2018-12-19 NOTE — NUR
PATIENT HAS HAD A SOFT BOWEL MOVEMENT AND HE STATED "I'M NOT TAKING ANY SUPPOSITORY THAT YOU 
ARE GOING TO BE GIVING ME". PATIENT C/O PAIN TO THE ABDOMEN WITH PAIN SCORE #7, MEDICATED 
WITH DILAUDID AS ORDERED. CALL LIGHT WITHIN EASY REACH, BED ALARM ON AND NG TUBE CONNECTED 
TO SUCTION AS ORDERED.

## 2018-12-19 NOTE — HISTORY AND PHYSICAL
HISTORY OF PRESENT ILLNESS:  This is a 73-year-old male with past medical 

history positive for lung cancer, COPD, coronary artery disease, 

hypertension.  He came here with abdominal pain, nausea and vomiting.  The 

patient was found to have a small-bowel obstruction. NG tube was placed for 

suctioning.  He is feeling better now.  He wants the nasogastric tube out.  

I explained to the patient that he came with a small-bowel obstruction.  

The surgeon already saw the patient, Dr. Wyatt, and he recommended to 

keep the NG tube in place.  I am going to order another abdominal x-ray to 

see if the small-bowel obstruction is gone.  If it is gone, then if okay 

with the surgeon we can remove the NG tube.  The patient is very 

unreasonable.



REVIEW OF SYSTEMS

CARDIOVASCULAR:  No chest pain or palpitations. 

RESPIRATORY:  No shortness of breath.  No cough. 

GASTROINTESTINAL:  He had nausea, vomiting, abdominal distention and 

abdominal pain, which is resolved right now.  No blood in the stools.  No 

constipation. 

GENITOURINARY:  No frequency.  No dysuria.



ALLERGIES:  NOT ALLERGIC TO ANY MEDICATIONS.



SOCIAL HISTORY:  He used to smoke.  He does not smoke any more.  He does 

not drink alcohol.



PAST MEDICAL HISTORY:  Lung cancer, COPD, hypertension, coronary artery 

disease.



PHYSICAL EXAMINATION

HEART:   Regular rhythm.  Normal S1, S2 sounds. 

LUNGS:  Clear bilaterally. 

ABDOMEN:  Soft.  Nontender and nondistended.  No visceromegaly. 

EXTREMITIES:  No evidence of cyanosis, edema or trauma. 

VITAL SIGNS:  Blood pressure 148/65, temperature 97.1, heart rate 84 per 

minute, respiratory rate is 20 per minute.  Oxygen saturation 91%.  



The CT of the abdomen showed small-bowel obstruction.  



On the BMP, sodium 140, potassium 4.8, chloride 104, CO2 28, BUN 13, 

creatinine 1.12.  Glucose 100.  On the CBC, white blood count 10.4; 

hemoglobin 12.9; hematocrit 40.6; platelet count 242,000.  PT 13.5, PTT 

27.3, INR 0.95.  AST 38, ALT 32, total bilirubin 1.3, alkaline phosphatase 

of 53. 



FINAL IMPRESSION

1. Small-bowel obstruction.

2. Rule out acute cholecystitis.  

3. Chronic obstructive pulmonary disease. 

4. Hypertension.  

5. Coronary artery disease.

6. Lung cancer.



PLAN OF TREATMENT:  N.P.O.  NG tube to suction.  We are going to do another 

KUB to see if the patient's bowel obstruction has resolved.  If it is 

resolved, then the NG tube can be removed.  Surgeon Dr. Wyatt is 

following the case.  Also, Dr. Hill is seeing him from the oncology 

point of view because of the diagnosis of lung cancer.  He is on 

metronidazole 500 mg IV q.6 h. and Zosyn 3.375 grams IV q.6 h.  Continue 

normal saline at 125 mL an hour.  Dilaudid 1 mg IV push every 4 hours as 

needed.  Zofran 4 mg IV q.4 h. as needed.  Also we are going to put him on 

Solu-Medrol 40 mg IV q.8 h. because he was taking prednisone before because 

of the COPD.  As I said, the surgeon will decide about the bowel 

obstruction.  We are going to do another KUB.  HIDA scan has been ordered 

also to rule out the diagnosis of acute cholecystitis.  





DD:  12/19/2018 10:35

DT:  12/19/2018 11:12

Job#:  [id number]

## 2018-12-19 NOTE — NUR
This charge nurse rounded on the patient.  Pt has complaints regarding timing of abdominal 
x-ray.  Explained to the patient that urgent and emergent cases are seen first.  Pt 
verbalized understanding and feels more involved and in control of care.

## 2018-12-19 NOTE — NUR
Received patient mid fowlers position, side rails upx2, call light within reach, bed alarm 
on. AAOX3 to time, person, place. Respirations even and unlabored. NS 125ml/hr via left FA . 
Instructed patient to use call light for assistance.

## 2018-12-19 NOTE — DIAGNOSTIC IMAGING REPORT
Hepatobiliary Scan with Gallbladder Ejection Fraction



Clinical information: 73 M with SBO, likely resolved, and new onset abdominal

pain



Technique: Following intravenous administration of 6.7 millicuries of Tc-99m

mebrofenin, dynamic images of the abdomen in the anterior projection were

obtained through 50 minutes.  Sincalide (CCK analog) 1.6 micrograms was

administered intravenously over 30 minutes with additional imaging for

determination of gallbladder ejection fraction.



Discussion: Perfusion of the liver is normal.  Extraction of tracer by the

liver parenchyma is normal.  Tracer appears promptly within the biliary tract. 

The gallbladder begins to fill at 18 minutes post injection of tracer and fills

adequately.  Tracer is seen in the small bowel by 35 minutes.  There is no

contractile response by the gallbladder to the pharmacologic dose of sincalide.

 No emptying of the gallbladder occurs during the 30 minute infusion.  



Impression: 



1.  Filling of the gallbladder excludes acute cystic duct obstruction/acute

cholecystitis.



2.  The gallbladder ejection fraction is undefined as there is no emptying of

the gallbladder during the infusion of sincalide.  This absence of a

contractile response to sincalide supports the clinical diagnosis of chronic

cholecystitis/gallbladder dyskinesia.



Signed by: Dr. Alessia Dupont M.D. on 12/19/2018 3:25 PM

## 2018-12-19 NOTE — DIAGNOSTIC IMAGING REPORT
ABDOMEN 2 VIEW



Clinical history: ^FALT AND UPRIGHT PT WITH SBO ^[id number] ^0450 ^Y

Technique: AP view abdomen, supine and upright

Comparison: Recent CT



Findings:

NG tube position within the expected stomach. No dilated gas-filled loops of

small bowel. Moderate colonic stool burden. No free air. Vascular

calcifications and scoliotic curvature of the spine noted.



Impression:

No radiographic findings of small bowel obstruction. 



Signed by: Dr Lidia Madden MD on 12/19/2018 5:31 AM

## 2018-12-19 NOTE — CONSULTATION
DATE OF CONSULTATION:  December 19, 2018 



REASON FOR CONSULTATION:  Small-bowel obstruction.



The patient is a pleasant 73-year-old man who was doing well until 

yesterday when he developed sudden onset of abdominal pain located mainly 

in the periumbilical and the left upper part of the abdomen.  He felt that 

he had a lump in there that was hard.  The patient experienced vomiting and 

nausea, but denied any diarrhea. The patient stated that he felt a lump in 

that area for several months, but it had never bothered him.  The patient 

came to the emergency room where CT scan revealed small-bowel obstruction 

and a ventral hernia located in the epigastric region with herniation of 

fat.  There was no bowel content.  In addition, there was a large single 

gallstone without any ductal dilatation.  The patient since admission had 

an NG tube placed.  He feels better now.  Denies any abdominal pain.  

Followup x-rays this morning revealed resolution of small-bowel obstruction 

seen on the CT scan.  



PAST MEDICAL HISTORY:  Hypertension, history of breast and lung cancer.



PAST SURGICAL HISTORY:  Included left inguinal hernia and a right 

mastectomy years ago.



MEDICINES:  See list.



ALLERGIES:  HE HAS NO KNOWN ALLERGIES.



FAMILY HISTORY:  Noncontributory.



REVIEW OF SYSTEMS:  As per history of present illness.  



PHYSICAL EXAMINATION 

GENERAL:  Reveals a 73-year-old male awake, alert and in no acute distress.

HEENT:  There is no acute process. 

LUNGS:  Clear.

HEART:  Reveals regular sinus rhythm.  

CHEST:  Reveals an absent right breast.  There is no right axillary 

adenopathy.  

ABDOMEN:  Reveals a soft and nontender abdomen without peritoneal signs.  

There is a palpable hernia located superior to the umbilicus.

RECTAL:  Deferred. 

NEUROLOGICAL:  Nonfocal.  



Admission white count was 18,000 which is now normal.  His electrolytes are 

normal.  Liver chemistries are normal except for a bilirubin of 1.3.  



ASSESSMENT 

1.  Small-bowel obstruction which appears to have resolved now by x-rays.

2.  Ventral hernia located superior to the umbilicus, which by computerized 

tomography scan shows only fat.  

3.  Single gallstone:  The patient is asymptomatic.  



PLAN:  Continue with current treatment, namely n.p.o. status, NG tube, IV 

fluids, serial x-rays.  The question as to what caused the small-bowel 

obstruction could be due to the fact that he had incarcerated left inguinal 

hernia with repair, and possibly this could have created adhesions.  The 

other possibility is the fact that this patient had a loop of bowel that 

got stuck into the defect of the ventral hernia, and now has reduced.  Less 

likely, it could be due to metastatic disease from his cancer.  We will 

follow the patient along with the other consultants.  

  





DD:  12/19/2018 08:10

DT:  12/19/2018 08:23

Job#:  [id number]

## 2018-12-19 NOTE — NUR
Patient transferred from ER via stretcher, A&Ox4, ambulatory w/o assist. Patient is on RA, 
respirations even & unlabored, no distress noted. IV to L AC SL, patent & no infiltration 
noted. Rates pain level 5/10 to mid abdomen, patient also c/o sore throat. Patient has NGT 
to [facility], w/ setting @ [facility].

## 2018-12-20 VITALS — SYSTOLIC BLOOD PRESSURE: 149 MMHG | DIASTOLIC BLOOD PRESSURE: 68 MMHG

## 2018-12-20 VITALS — SYSTOLIC BLOOD PRESSURE: 137 MMHG | DIASTOLIC BLOOD PRESSURE: 65 MMHG

## 2018-12-20 VITALS — DIASTOLIC BLOOD PRESSURE: 66 MMHG | SYSTOLIC BLOOD PRESSURE: 150 MMHG

## 2018-12-20 VITALS — SYSTOLIC BLOOD PRESSURE: 151 MMHG | DIASTOLIC BLOOD PRESSURE: 65 MMHG

## 2018-12-20 RX ADMIN — TAZOBACTAM SODIUM AND PIPERACILLIN SODIUM SCH GM: 375; 3 INJECTION, SOLUTION INTRAVENOUS at 21:38

## 2018-12-20 RX ADMIN — TAZOBACTAM SODIUM AND PIPERACILLIN SODIUM SCH GM: 375; 3 INJECTION, SOLUTION INTRAVENOUS at 06:48

## 2018-12-20 RX ADMIN — METHYLPREDNISOLONE SODIUM SUCCINATE SCH MG: 40 INJECTION, POWDER, LYOPHILIZED, FOR SOLUTION INTRAMUSCULAR; INTRAVENOUS at 06:48

## 2018-12-20 RX ADMIN — METHYLPREDNISOLONE SODIUM SUCCINATE SCH MG: 40 INJECTION, POWDER, LYOPHILIZED, FOR SOLUTION INTRAMUSCULAR; INTRAVENOUS at 14:52

## 2018-12-20 RX ADMIN — METRONIDAZOLE SCH MG: 500 INJECTION, SOLUTION INTRAVENOUS at 12:00

## 2018-12-20 RX ADMIN — HYDROCODONE BITARTRATE AND ACETAMINOPHEN PRN EA: 7.5; 325 TABLET ORAL at 21:46

## 2018-12-20 RX ADMIN — SODIUM CHLORIDE SCH MLS/HR: 9 INJECTION, SOLUTION INTRAVENOUS at 14:52

## 2018-12-20 RX ADMIN — METRONIDAZOLE SCH MG: 500 INJECTION, SOLUTION INTRAVENOUS at 07:23

## 2018-12-20 RX ADMIN — SODIUM CHLORIDE SCH MLS/HR: 9 INJECTION, SOLUTION INTRAVENOUS at 07:19

## 2018-12-20 RX ADMIN — IPRATROPIUM BROMIDE AND ALBUTEROL SCH EA: 20; 100 SPRAY, METERED RESPIRATORY (INHALATION) at 07:45

## 2018-12-20 RX ADMIN — IPRATROPIUM BROMIDE AND ALBUTEROL SCH EA: 20; 100 SPRAY, METERED RESPIRATORY (INHALATION) at 20:17

## 2018-12-20 RX ADMIN — Medication PRN ML: at 00:50

## 2018-12-20 RX ADMIN — HYDROCODONE BITARTRATE AND ACETAMINOPHEN PRN EA: 7.5; 325 TABLET ORAL at 14:52

## 2018-12-20 RX ADMIN — METHYLPREDNISOLONE SODIUM SUCCINATE SCH MG: 40 INJECTION, POWDER, LYOPHILIZED, FOR SOLUTION INTRAMUSCULAR; INTRAVENOUS at 21:38

## 2018-12-20 RX ADMIN — METRONIDAZOLE SCH MG: 500 INJECTION, SOLUTION INTRAVENOUS at 18:00

## 2018-12-20 RX ADMIN — BISACODYL SCH MG: 10 SUPPOSITORY RECTAL at 09:21

## 2018-12-20 RX ADMIN — IPRATROPIUM BROMIDE AND ALBUTEROL SCH EA: 20; 100 SPRAY, METERED RESPIRATORY (INHALATION) at 11:00

## 2018-12-20 RX ADMIN — BISACODYL SCH MG: 10 SUPPOSITORY RECTAL at 21:00

## 2018-12-20 RX ADMIN — HYDROMORPHONE HYDROCHLORIDE PRN MG: 2 INJECTION INTRAMUSCULAR; INTRAVENOUS; SUBCUTANEOUS at 04:21

## 2018-12-20 RX ADMIN — HYDROCODONE BITARTRATE AND ACETAMINOPHEN PRN EA: 7.5; 325 TABLET ORAL at 00:50

## 2018-12-20 RX ADMIN — Medication SCH MG: at 21:46

## 2018-12-20 RX ADMIN — METRONIDAZOLE SCH MG: 500 INJECTION, SOLUTION INTRAVENOUS at 00:40

## 2018-12-20 RX ADMIN — TAZOBACTAM SODIUM AND PIPERACILLIN SODIUM SCH GM: 375; 3 INJECTION, SOLUTION INTRAVENOUS at 14:52

## 2018-12-20 RX ADMIN — IPRATROPIUM BROMIDE AND ALBUTEROL SCH EA: 20; 100 SPRAY, METERED RESPIRATORY (INHALATION) at 14:43

## 2018-12-20 RX ADMIN — FAMOTIDINE SCH MG: 20 TABLET, FILM COATED ORAL at 21:46

## 2018-12-20 NOTE — NUR
PATIENT IS ASLEEP, HE'S EASY TO AROUSE. NO RESPIRATORY DISTRESS OBSERVED, HE DENIES 
ABDOMINAL PAIN. BED ALARM ON, CALL LIGHT WITHIN EASY REACH, HE'S INSTRUCTED TO TO CALL FOR 
ASSISTANCE AS NEEDED.

## 2018-12-20 NOTE — NUR
PATIENT C/O PAIN TO THE ABDOMEN AND THROAT WITH PAIN SCORE #8, MEDICATED WITH DILAUDID AS 
ORDERED. ASSISTED WITH ADLS, CALL LIGHT IN EASY REACH, INSTRUCTED TO CALL FOR ASSISTANCE AS 
NEEDED.

## 2018-12-20 NOTE — NUR
Rounds by hematology/oncology and communicating to surgeon patient will need surgery for 
hernia and gall bladder, surgeon will see patient later today

## 2018-12-20 NOTE — NUR
PATIENT ASSISTED TO THE RESTROOM, HE REFUSED TO HAVE THE PRIMARY NURSE STAND AND WAIT FOR 
HIM IN THE RESTROOM. HE WAS INSTRUCTED TO CALL FOR ASSISTANCE UPON COMPLETION SO THAT HE CAN 
BE ASSISTED BACK TO THE BED.

## 2018-12-20 NOTE — NUR
PATIENT C/O PAIN TO THE ABDOMEN WITH PAIN SCORE #6, MEDICATED WITH NORCO 1TAB AS ORDERED. 
CALL LIGHT IN EASY REACH, INSTRUCTED TO CALL FOR ASSISTANCE UPON GETTING OUT OF THE BED DUE 
TO SIDE EFFECT FROM THE PAIN MEDICATION.

## 2018-12-20 NOTE — DIAGNOSTIC IMAGING REPORT
ABDOMEN 2 VIEW



Clinical history: Small bowel obstruction

Technique: AP view abdomen, supine and upright

Comparison: Recent CT, previous day x-ray



Findings:

Stable NG tube position within the expected stomach. No dilated gas-filled

loops of small bowel. Moderate colonic stool burden. No free air. Vascular

calcifications and scoliotic curvature of the spine noted.



Impression:

No radiographic findings of small bowel obstruction. 



Signed by: Dr Lidia Madden MD on 12/20/2018 6:32 AM

## 2018-12-20 NOTE — NUR
Rounds by Dr. BERNICE Arroyo and orders in place for procedure tomorrow and to obtain consent, 
patient will be on full liq diet and NPO starting tomorrow.

## 2018-12-20 NOTE — PROGRESS NOTE
DATE:  December 20, 2018 



INTERNAL MEDICINE PROGRESS NOTE



SUBJECTIVE:  The patient is doing better.  Small-bowel obstruction has been 

removed, but he had acute cholecystitis.  He is going to go for a 

cholecystectomy tomorrow.



PHYSICAL EXAMINATION 

VITAL SIGNS:  Blood pressure 137/65.  Temperature 96.9, heart rate 70 per 

minute.  Respiratory rate 16 per minute.  Oxygen saturation 95%.  

HEART:  Regular rhythm, normal S1 and S2 sounds. 

LUNGS:  Clear bilaterally. 

ABDOMEN:  Soft. 



BLOOD WORK:  BMP:  Sodium 140, potassium 4.8, chloride 104, CO2 28, BUN 13, 

creatinine 1.12, glucose 100.  CBC:  White blood count 10.4; hemoglobin 

12.9; hematocrit 40.6; platelet count 242.000.  PT 13.5, INR 0.95, PTT 

27.3.  AST 30, ALT 32, total bilirubin 1.3, alkaline phosphatase 53.  



FINAL IMPRESSION

1. Small-bowel obstruction, which is completely resolved.

2. Acute cholecystitis.  

3. Chronic obstructive pulmonary disease. 

4. Lung cancer.

5. Hypertension.  

6. Coronary artery disease.



PLAN OF TREATMENT:  Dr. Wyatt will perform a cholecystectomy tomorrow.  

Continue albuterol and Atrovent q.6 h.  Continue IV at 125 mL an hour.  

Dilaudid 1 mg IV q.4 h. as needed, Zofran 4 mg IV q.4 h. as needed, 

Claritin 10 mg daily, Dulcolax 10 mg twice a day,  metronidazole 500 mg IV 

q.6 h., lorazepam 1 mg daily, Norco 1 tablet daily, Zosyn 3.375 grams IV 

q.6 h., Solu-Medrol 40 mg IV q.8 h., Pepcid 20 mg at bedtime.  



Dr. Priyanka Ralph will be covering for me starting tomorrow at 7 a.m. 

until same day at 4 p.m., and after that Dr. Cyr is on call from 

Friday, December 21st, at 4:30 p.m. until Monday the 24th at 7 a.m. 









DD:  12/20/2018 12:04

DT:  12/20/2018 12:22

Job#:  [id number]

## 2018-12-20 NOTE — NUR
Orders per Dr. BERNICE Arroyo to dc NG tube and start patient on clear liquid diet and orders in 
place.

## 2018-12-20 NOTE — NUR
Patient will have procedure tomorrow, will be NPO from midnight tonight, tolerated all meds 
and dinner, call light within reach, OOB and ambulating, will monitor

## 2018-12-21 VITALS — SYSTOLIC BLOOD PRESSURE: 154 MMHG | DIASTOLIC BLOOD PRESSURE: 74 MMHG

## 2018-12-21 VITALS — SYSTOLIC BLOOD PRESSURE: 140 MMHG | DIASTOLIC BLOOD PRESSURE: 63 MMHG

## 2018-12-21 VITALS — DIASTOLIC BLOOD PRESSURE: 60 MMHG | SYSTOLIC BLOOD PRESSURE: 121 MMHG

## 2018-12-21 VITALS — DIASTOLIC BLOOD PRESSURE: 60 MMHG | SYSTOLIC BLOOD PRESSURE: 132 MMHG

## 2018-12-21 VITALS — SYSTOLIC BLOOD PRESSURE: 132 MMHG | DIASTOLIC BLOOD PRESSURE: 62 MMHG

## 2018-12-21 VITALS — SYSTOLIC BLOOD PRESSURE: 114 MMHG | DIASTOLIC BLOOD PRESSURE: 57 MMHG

## 2018-12-21 PROCEDURE — 0WQF4ZZ REPAIR ABDOMINAL WALL, PERCUTANEOUS ENDOSCOPIC APPROACH: ICD-10-PCS | Performed by: SURGERY

## 2018-12-21 PROCEDURE — 0FT44ZZ RESECTION OF GALLBLADDER, PERCUTANEOUS ENDOSCOPIC APPROACH: ICD-10-PCS | Performed by: SURGERY

## 2018-12-21 RX ADMIN — METRONIDAZOLE SCH MG: 500 INJECTION, SOLUTION INTRAVENOUS at 06:32

## 2018-12-21 RX ADMIN — SODIUM CHLORIDE PRN MG: 900 INJECTION INTRAVENOUS at 21:06

## 2018-12-21 RX ADMIN — IPRATROPIUM BROMIDE AND ALBUTEROL SCH EA: 20; 100 SPRAY, METERED RESPIRATORY (INHALATION) at 07:00

## 2018-12-21 RX ADMIN — METRONIDAZOLE SCH MG: 500 INJECTION, SOLUTION INTRAVENOUS at 00:25

## 2018-12-21 RX ADMIN — SODIUM CHLORIDE SCH MLS/HR: 9 INJECTION, SOLUTION INTRAVENOUS at 16:21

## 2018-12-21 RX ADMIN — METRONIDAZOLE SCH MG: 500 INJECTION, SOLUTION INTRAVENOUS at 13:19

## 2018-12-21 RX ADMIN — METHYLPREDNISOLONE SODIUM SUCCINATE SCH MG: 40 INJECTION, POWDER, LYOPHILIZED, FOR SOLUTION INTRAMUSCULAR; INTRAVENOUS at 22:41

## 2018-12-21 RX ADMIN — IPRATROPIUM BROMIDE AND ALBUTEROL SCH EA: 20; 100 SPRAY, METERED RESPIRATORY (INHALATION) at 14:55

## 2018-12-21 RX ADMIN — Medication SCH MG: at 21:06

## 2018-12-21 RX ADMIN — IPRATROPIUM BROMIDE AND ALBUTEROL SCH EA: 20; 100 SPRAY, METERED RESPIRATORY (INHALATION) at 11:00

## 2018-12-21 RX ADMIN — SODIUM CHLORIDE SCH MLS/HR: 9 INJECTION, SOLUTION INTRAVENOUS at 00:25

## 2018-12-21 RX ADMIN — METHYLPREDNISOLONE SODIUM SUCCINATE SCH MG: 40 INJECTION, POWDER, LYOPHILIZED, FOR SOLUTION INTRAMUSCULAR; INTRAVENOUS at 14:00

## 2018-12-21 RX ADMIN — TAZOBACTAM SODIUM AND PIPERACILLIN SODIUM SCH GM: 375; 3 INJECTION, SOLUTION INTRAVENOUS at 22:41

## 2018-12-21 RX ADMIN — TAZOBACTAM SODIUM AND PIPERACILLIN SODIUM SCH GM: 375; 3 INJECTION, SOLUTION INTRAVENOUS at 05:51

## 2018-12-21 RX ADMIN — TAZOBACTAM SODIUM AND PIPERACILLIN SODIUM SCH GM: 375; 3 INJECTION, SOLUTION INTRAVENOUS at 14:46

## 2018-12-21 RX ADMIN — FAMOTIDINE SCH MG: 20 TABLET, FILM COATED ORAL at 21:06

## 2018-12-21 RX ADMIN — HYDROMORPHONE HYDROCHLORIDE PRN MG: 2 INJECTION INTRAMUSCULAR; INTRAVENOUS; SUBCUTANEOUS at 21:06

## 2018-12-21 RX ADMIN — METHYLPREDNISOLONE SODIUM SUCCINATE SCH MG: 40 INJECTION, POWDER, LYOPHILIZED, FOR SOLUTION INTRAMUSCULAR; INTRAVENOUS at 05:51

## 2018-12-21 RX ADMIN — SODIUM CHLORIDE SCH MLS/HR: 9 INJECTION, SOLUTION INTRAVENOUS at 07:19

## 2018-12-21 RX ADMIN — IPRATROPIUM BROMIDE AND ALBUTEROL SCH EA: 20; 100 SPRAY, METERED RESPIRATORY (INHALATION) at 19:00

## 2018-12-21 NOTE — PROGRESS NOTE
DATE:  December 21, 2018 



I am covering for Dr. Atkinson. 



Mr. Haywood is a 73-year-old man with a history of COPD, coronary artery 

disease, hypertension, lung cancer came to the emergency room complaining 

of abdominal pain, nausea and vomiting.  He was found to have a small-bowel 

obstruction.  Surgical consult was requested.  Small-bowel obstruction 

improved, but the patient was found to have a ventral hernia and 

gallstones.  He is going to go for ventral hernia repair and 

cholecystectomy today.



PHYSICAL EXAMINATION 

GENERAL:  He is awake and alert.

VITALS:  Temperature is 96.4, blood pressure 154/74.

HEART:  Regular rate.

LUNGS:  Clear to auscultation. 

ABDOMEN:  Soft.



BLOOD WORK:  Potassium 4.8, creatinine is 1.12, glucose is 100.  White 

count 10.4, hemoglobin 12.9, hematocrit 40.6.



ASSESSMENT AND PLAN 

1.  Status post small-bowel obstruction.

2.  Acute cholecystitis.

3.  Chronic obstructive pulmonary disease.

4.  History of lung cancer. 

5.  Coronary artery disease.

6.  Hypertension.



PLAN:  At the present time, the patient is going to go for cholecystectomy 

and hernia repair today.  We are going to continue neb treatments.  

Dilaudid for pain.  Continue Zofran for nausea 

and vomiting.  Continue IV antibiotics.  All of this was discussed with the 

patient and wife at bedside.  All questions were answered to satisfaction. 









DD:  12/21/2018 08:14

DT:  12/21/2018 08:19

Job#:  [id number]

## 2018-12-21 NOTE — NUR
PATIENT ASSISTED TO THE RESTROOM TO VOID AND HE'S NOW BACK IN BED. HE C/O NAUSEA AND 
ABDOMINAL PAIN, MEDICATED WITH ZOFRAN AND DILAUDID AS ORDERED. CALL LIGHT WITHIN EASY REACH, 
BED ALARM ON, INSTRUCTED TO CALL FOR ASSISTANCE UPON GETTING OUT OF THE BED.

## 2018-12-21 NOTE — OPERATIVE REPORT
DATE OF PROCEDURE:  December 21, 2018 



PREOPERATIVE DIAGNOSES

1. Small-bowel obstruction secondary to epigastric ventral hernia.  

2. Chronic cholecystitis secondary to cholelithiasis.  

3. Multiple medical problems including breast and lung cancer.



POSTOPERATIVE DIAGNOSES

1. Small-bowel obstruction secondary to epigastric ventral hernia.  

2. Chronic cholecystitis secondary to cholelithiasis.  

3. Multiple medical problems including breast and lung cancer.



PROCEDURES PERFORMED

1. Diagnostic laparoscopy.  

2. Laparoscopic cholecystectomy.  

3. Repair of ventral epigastric hernia primarily.  



ASSISTANT:  DEBORAH Armas



ESTIMATED BLOOD LOSS:  Minimal.



DRAINS:  None.



COMPLICATIONS:  None.



INDICATIONS AND FINDINGS:  The patient is an anxious 73-year-old male who 

was admitted to the hospital complaining of severe epigastric abdominal 

pain, nausea and vomiting.  The patient came to the emergency room where CT 

scan revealed a ventral hernia with herniation of fat and a small-bowel 

obstruction.  The patient was admitted to the hospital, treated with NG 

tube, IV fluids, n.p.o. status.  Repeated x-rays revealed resolution of the 

small-bowel obstruction. Once the small-bowel obstruction resolved, the 

patient was taken to the operating room for repair of ventral hernia and 

cholecystectomy.  The cholecystectomy was undertaken simultaneously with 

the ventral hernia repair due to the fact that the admission CT scan had 

revealed a large single stone within the gallbladder and no evidence of 

acute cholecystitis by CAT scan.  However, the HIDA scan revealed 0 

ejection fraction.



INTRAOPERATIVE FINDINGS:  The patient had a loop of bowel in the area of 

the distal jejunum and proximal ileum that contained some ecchymosis and 

superficial hemorrhage, all consistent with a closed-loop obstruction that 

spontaneously had resolved.  The small bowel was run in its entirety from 

the ileocecal valve to the ligament of Treitz, and we saw no other 

abnormalities.  The patient was status post repair of a left inguinal 

hernia.  There did not appear to be a recurrence.  There appeared to be a 

right inguinal hernia by laparoscopy.  Given the fact that this patient has 

no symptoms and he has multiple cancers, I think this hernia can be 

observed nonoperatively.  



DESCRIPTION OF PROCEDURE:  With the patient lying on the operating table in 

the supine position, after administration of general endotracheal 

anesthesia, he was prepped and draped for laparoscopic cholecystectomy and 

repair of ventral hernia.  The procedure was begun by establishing a 

pneumoperitoneum in the a right upper quadrant mid-clavicular line, and 

then a pneumoperitoneum was insufflated to 15 mm of pressure.  The 5-mm 

trocar was placed in that location and the camera introduced.  We 

identified a hernia in the supraumbilical area with herniation of the 

omentum at that point and no bowel.  We placed a 10-mm subxiphoid port, and 

then a right anterior axillary line 5-mm trocar was also placed.  We then 

reduced the omentum from the hernia, leaving the defect empty.  Then we 

placed a 10-11 trocar through that defect to be repaired later on.  We then 

performed a laparoscopic cholecystectomy.  The gallbladder appeared to be 

somewhat distended with some edema of the wall.  We decompressed the 

gallbladder and then began the dissection.  There were adhesions of the 

stomach distally to the gallbladder.  After we lysed those, we identified 

the cystic duct high in the neck of the gallbladder and traced it down to 

the common bile duct that was seen.  The cystic artery both anterior and 

posterior branches were seen, and plate of the liver was identified.  At 

that point, we clipped the cystic duct distally 3 times and once proximally 

and transected between titanium clips.  The cystic arteries were transected 

between titanium clips also.  Then we took the gallbladder down from the 

liver using electrocautery dissection, placed it in an Endo bag and removed 

it through the umbilical port.  After we did that, we inspected the 

operative field.  We irrigated the gallbladder bed fossa.  Some minor 

oozing was cauterized.  At this point then we went ahead and ran the small 

bowel from the terminal ileum to the ligament of Treitz twice.  We found a 

somewhat hemorrhagic knuckle of bowel where the incarceration had been 

present.  This bowel was viable.  The patient no longer had any abdominal 

pain and was tolerating a regular diet well preoperatively.  After we 

identified the suspected cause of the obstruction, we went ahead and then 

did a primary repair.  We excised the sac with electrocautery and then 

closed the hernia with a series of interrupted #0 Ethibond sutures.  A 

small umbilical defect was included in the repair.  After we did that, we 

irrigated the midline transverse incision that had been made for the trocar 

site.  After verification that the sponge and instrument counts were 

correct and all bleeding points were cauterized, we closed the subcutaneous 

tissues with a combination of #0 and 2-0 Vicryl, and the skin was closed 

with a combination of silk and staples for the hernia site and staples for 

the port sites.  Prior to closure of the abdomen, we gave the patient a 

local block with a mixture of 20 mL of Exparel with 20 mL of 0.25% Marcaine 

with epinephrine along the fascia and the skin edges as well as the port 

sites, total of 40 mL.  A sterile dressing was applied.  The patient 

tolerated the procedure well and was taken to the recovery room in stable 

condition.







DD:  12/21/2018 12:49

DT:  12/21/2018 13:12

Job#:  [id number]

## 2018-12-21 NOTE — NUR
BACK IN ROOM VIA STRETCHER, AA&OX3, RA SATS 88%, 02 PER PROTOCOL, ABD DRESSING CDI, BINDER 
INTACT, DENIES PAIN AT THIS TIME, CALL LIGHT WITHIN REACH

## 2018-12-21 NOTE — NUR
PATIENT IS ASLEEP, HE'S EASY TO AROUSE. NO RESPIRATORY DISTRESS OBSERVED, PATIENT C/O MILD 
ABDOMINAL PAIN WITH PAIN SCORE #3. HE'S ON NPO STATUS THIS MORNING FOR SURGERY, CALL LIGHT 
WITHIN EASY REACH.

## 2018-12-22 VITALS — DIASTOLIC BLOOD PRESSURE: 82 MMHG | SYSTOLIC BLOOD PRESSURE: 168 MMHG

## 2018-12-22 VITALS — SYSTOLIC BLOOD PRESSURE: 125 MMHG | DIASTOLIC BLOOD PRESSURE: 70 MMHG

## 2018-12-22 VITALS — SYSTOLIC BLOOD PRESSURE: 139 MMHG | DIASTOLIC BLOOD PRESSURE: 61 MMHG

## 2018-12-22 VITALS — DIASTOLIC BLOOD PRESSURE: 57 MMHG | SYSTOLIC BLOOD PRESSURE: 111 MMHG

## 2018-12-22 VITALS — DIASTOLIC BLOOD PRESSURE: 56 MMHG | SYSTOLIC BLOOD PRESSURE: 155 MMHG

## 2018-12-22 VITALS — DIASTOLIC BLOOD PRESSURE: 68 MMHG | SYSTOLIC BLOOD PRESSURE: 154 MMHG

## 2018-12-22 RX ADMIN — SODIUM CHLORIDE SCH MLS/HR: 9 INJECTION, SOLUTION INTRAVENOUS at 06:39

## 2018-12-22 RX ADMIN — FAMOTIDINE SCH MG: 20 TABLET, FILM COATED ORAL at 21:00

## 2018-12-22 RX ADMIN — TAZOBACTAM SODIUM AND PIPERACILLIN SODIUM SCH GM: 375; 3 INJECTION, SOLUTION INTRAVENOUS at 22:53

## 2018-12-22 RX ADMIN — METHYLPREDNISOLONE SODIUM SUCCINATE SCH MG: 40 INJECTION, POWDER, LYOPHILIZED, FOR SOLUTION INTRAMUSCULAR; INTRAVENOUS at 14:30

## 2018-12-22 RX ADMIN — Medication SCH MG: at 21:00

## 2018-12-22 RX ADMIN — TAZOBACTAM SODIUM AND PIPERACILLIN SODIUM SCH GM: 375; 3 INJECTION, SOLUTION INTRAVENOUS at 06:35

## 2018-12-22 RX ADMIN — TAZOBACTAM SODIUM AND PIPERACILLIN SODIUM SCH GM: 375; 3 INJECTION, SOLUTION INTRAVENOUS at 14:30

## 2018-12-22 RX ADMIN — METHYLPREDNISOLONE SODIUM SUCCINATE SCH MG: 40 INJECTION, POWDER, LYOPHILIZED, FOR SOLUTION INTRAMUSCULAR; INTRAVENOUS at 06:35

## 2018-12-22 RX ADMIN — IPRATROPIUM BROMIDE AND ALBUTEROL SCH EA: 20; 100 SPRAY, METERED RESPIRATORY (INHALATION) at 19:00

## 2018-12-22 RX ADMIN — HYDROCODONE BITARTRATE AND ACETAMINOPHEN PRN EA: 7.5; 325 TABLET ORAL at 10:15

## 2018-12-22 RX ADMIN — HYDROCODONE BITARTRATE AND ACETAMINOPHEN PRN EA: 7.5; 325 TABLET ORAL at 05:23

## 2018-12-22 RX ADMIN — IPRATROPIUM BROMIDE AND ALBUTEROL SCH EA: 20; 100 SPRAY, METERED RESPIRATORY (INHALATION) at 15:00

## 2018-12-22 RX ADMIN — IPRATROPIUM BROMIDE AND ALBUTEROL SCH EA: 20; 100 SPRAY, METERED RESPIRATORY (INHALATION) at 07:00

## 2018-12-22 RX ADMIN — HYDROCODONE BITARTRATE AND ACETAMINOPHEN PRN EA: 7.5; 325 TABLET ORAL at 22:56

## 2018-12-22 RX ADMIN — METHYLPREDNISOLONE SODIUM SUCCINATE SCH MG: 40 INJECTION, POWDER, LYOPHILIZED, FOR SOLUTION INTRAMUSCULAR; INTRAVENOUS at 22:53

## 2018-12-22 RX ADMIN — HYDROCODONE BITARTRATE AND ACETAMINOPHEN PRN EA: 7.5; 325 TABLET ORAL at 16:40

## 2018-12-22 RX ADMIN — IPRATROPIUM BROMIDE AND ALBUTEROL SCH EA: 20; 100 SPRAY, METERED RESPIRATORY (INHALATION) at 11:00

## 2018-12-22 RX ADMIN — HYDROMORPHONE HYDROCHLORIDE PRN MG: 2 INJECTION INTRAMUSCULAR; INTRAVENOUS; SUBCUTANEOUS at 21:01

## 2018-12-22 NOTE — NUR
PATIENT ASSISTED TO THE RESTROOM AND HE'S NOW BACK IN THE BED. HE C/O PAIN TO THE ABDOMEN 
WITH PAIN SCORE #8, MEDICATED WITH NORCO 1TAB AS ORDERED. CALL LIGHT IN EASY REACH, 
INSTRUCTED TO CALL FOR ASSISTANCE UPON GETTING OUT OF THE BED.

## 2018-12-22 NOTE — NUR
PATIENT C/O MILD PAIN TO THE ABDOMEN; HOWEVER, HE REFUSED PAIN MEDICATION WHEN OFFER STATING 
THAT "I WILL CALL YOU WHEN I NEED THE PAIN MEDICATION". DRESSING REMAINS DRY AND INTACT TO 
THE ABDOMEN, BED ALARM ON AND CALL LIGHT WITHIN EASY REACH.

## 2018-12-22 NOTE — NUR
PATIENT C/O PAIN TO THE ABDOMEN WITH PAIN SCORE # 7, MEDICATED WITH NORCO 1TAB AS ORDERED. 
BED ALARM ON, CALL LIGHT WITHIN EASY REACH.

## 2018-12-22 NOTE — NUR
PATIENT AMBULATED WITH ROLLING WALKER FROM HIS ROOM TO THE NURSES STATION BY HIMSELF, HE'S 
NOW BACK IN THE BED. HE C/O PAIN TO THE ABDOMEN WITH PAIN SCORE #8, MEDICATED WITH DILAUDID 
AS ORDERED. CALL LIGHT IN EASY REACH, BED ALARM ON AND PATIENT INSTRUCTED TO CALL FOR 
ASSISTANCE UPON GETTING OUT OF THE BED.

## 2018-12-23 VITALS — SYSTOLIC BLOOD PRESSURE: 172 MMHG | DIASTOLIC BLOOD PRESSURE: 74 MMHG

## 2018-12-23 VITALS — SYSTOLIC BLOOD PRESSURE: 184 MMHG | DIASTOLIC BLOOD PRESSURE: 82 MMHG

## 2018-12-23 VITALS — SYSTOLIC BLOOD PRESSURE: 144 MMHG | DIASTOLIC BLOOD PRESSURE: 67 MMHG

## 2018-12-23 VITALS — DIASTOLIC BLOOD PRESSURE: 63 MMHG | SYSTOLIC BLOOD PRESSURE: 135 MMHG

## 2018-12-23 LAB
ALBUMIN SERPL-MCNC: 3.1 G/DL (ref 3.5–5)
ALBUMIN/GLOB SERPL: 0.9 {RATIO} (ref 0.8–2)
ALP SERPL-CCNC: 53 IU/L (ref 40–150)
ALT SERPL-CCNC: 94 IU/L (ref 0–55)
ANION GAP SERPL CALC-SCNC: 13.2 MMOL/L (ref 8–16)
BASOPHILS # BLD AUTO: 0 10*3/UL (ref 0–0.1)
BASOPHILS NFR BLD AUTO: 0.1 % (ref 0–1)
BUN SERPL-MCNC: 15 MG/DL (ref 7–26)
BUN/CREAT SERPL: 18 (ref 6–25)
CALCIUM SERPL-MCNC: 8.8 MG/DL (ref 8.4–10.2)
CHLORIDE SERPL-SCNC: 106 MMOL/L (ref 98–107)
CO2 SERPL-SCNC: 23 MMOL/L (ref 22–29)
DEPRECATED NEUTROPHILS # BLD AUTO: 14 10*3/UL (ref 2.1–6.9)
EGFRCR SERPLBLD CKD-EPI 2021: > 60 ML/MIN (ref 60–?)
EOSINOPHIL # BLD AUTO: 0 10*3/UL (ref 0–0.4)
EOSINOPHIL NFR BLD AUTO: 0 % (ref 0–6)
ERYTHROCYTE [DISTWIDTH] IN CORD BLOOD: 13.2 % (ref 11.7–14.4)
GLOBULIN PLAS-MCNC: 3.3 G/DL (ref 2.3–3.5)
GLUCOSE SERPLBLD-MCNC: 114 MG/DL (ref 74–118)
HCT VFR BLD AUTO: 38.9 % (ref 38.2–49.6)
HGB BLD-MCNC: 12.1 G/DL (ref 14–18)
LYMPHOCYTES # BLD: 0.8 10*3/UL (ref 1–3.2)
LYMPHOCYTES NFR BLD AUTO: 4.8 % (ref 18–39.1)
MCH RBC QN AUTO: 31.7 PG (ref 28–32)
MCHC RBC AUTO-ENTMCNC: 31.1 G/DL (ref 31–35)
MCV RBC AUTO: 101.8 FL (ref 81–99)
MONOCYTES # BLD AUTO: 1.2 10*3/UL (ref 0.2–0.8)
MONOCYTES NFR BLD AUTO: 7.2 % (ref 4.4–11.3)
NEUTS SEG NFR BLD AUTO: 87.4 % (ref 38.7–80)
PLATELET # BLD AUTO: 180 X10E3/UL (ref 140–360)
POTASSIUM SERPL-SCNC: 4.2 MMOL/L (ref 3.5–5.1)
RBC # BLD AUTO: 3.82 X10E6/UL (ref 4.3–5.7)
SODIUM SERPL-SCNC: 138 MMOL/L (ref 136–145)

## 2018-12-23 RX ADMIN — HYDROMORPHONE HYDROCHLORIDE PRN MG: 2 INJECTION INTRAMUSCULAR; INTRAVENOUS; SUBCUTANEOUS at 07:20

## 2018-12-23 RX ADMIN — SODIUM CHLORIDE SCH MLS/HR: 9 INJECTION, SOLUTION INTRAVENOUS at 06:39

## 2018-12-23 RX ADMIN — HYDROCODONE BITARTRATE AND ACETAMINOPHEN PRN EA: 7.5; 325 TABLET ORAL at 05:10

## 2018-12-23 RX ADMIN — HYDROCODONE BITARTRATE AND ACETAMINOPHEN PRN EA: 7.5; 325 TABLET ORAL at 11:15

## 2018-12-23 RX ADMIN — HYDROMORPHONE HYDROCHLORIDE PRN MG: 2 INJECTION INTRAMUSCULAR; INTRAVENOUS; SUBCUTANEOUS at 01:40

## 2018-12-23 RX ADMIN — IPRATROPIUM BROMIDE AND ALBUTEROL SCH EA: 20; 100 SPRAY, METERED RESPIRATORY (INHALATION) at 15:30

## 2018-12-23 RX ADMIN — IPRATROPIUM BROMIDE AND ALBUTEROL SCH EA: 20; 100 SPRAY, METERED RESPIRATORY (INHALATION) at 07:55

## 2018-12-23 RX ADMIN — TAZOBACTAM SODIUM AND PIPERACILLIN SODIUM SCH GM: 375; 3 INJECTION, SOLUTION INTRAVENOUS at 14:00

## 2018-12-23 RX ADMIN — METHYLPREDNISOLONE SODIUM SUCCINATE SCH MG: 40 INJECTION, POWDER, LYOPHILIZED, FOR SOLUTION INTRAMUSCULAR; INTRAVENOUS at 06:50

## 2018-12-23 RX ADMIN — IPRATROPIUM BROMIDE AND ALBUTEROL SCH EA: 20; 100 SPRAY, METERED RESPIRATORY (INHALATION) at 12:50

## 2018-12-23 RX ADMIN — TAZOBACTAM SODIUM AND PIPERACILLIN SODIUM SCH GM: 375; 3 INJECTION, SOLUTION INTRAVENOUS at 06:50

## 2018-12-23 NOTE — NUR
PT C/O "BEING STUCK AGAIN FROM LAB", TELEPHONED LAB TO INQUIRE REASON FOR 2ND STICK, STATES 
"NOT ENOUGH BLOOD COLLECTED THE FIRST TIME", "NO MORE NEEDS TO BE DRAWN" , PT MADE AWARE

## 2018-12-23 NOTE — NUR
STANDBY ASSIST AND USE OF WALKER TO BR, PT REPORTS PASSING FLATUS, CALL STRING WITHIN REACH, 
PT EDUCATED TO CALL FOR ASSISTANCE BEFORE GETTING UP, PT VERBALIZED UNDERSTANDING

## 2018-12-23 NOTE — NUR
DISCHARGE INSTRUCTIONS REVIEWED WITH PT AND WIFE, VERBALIZED UNDERSTANDING, WHEELED OFF UNIT 
VIA WC FOR DISCHARGE,  NO CHANGE IN CONDITION

## 2018-12-23 NOTE — NUR
PATIENT ASSISTED TO THE RESTROOM, HE'S EDUCATED TO CALL FOR ASSISTANCE UPON GETTING BACK TO 
THE BED.

## 2020-03-31 ENCOUNTER — HOSPITAL ENCOUNTER (OUTPATIENT)
Dept: HOSPITAL 88 - OR | Age: 75
Discharge: HOME | End: 2020-03-31
Attending: SURGERY
Payer: MEDICARE

## 2020-03-31 VITALS — DIASTOLIC BLOOD PRESSURE: 66 MMHG | SYSTOLIC BLOOD PRESSURE: 145 MMHG

## 2020-03-31 DIAGNOSIS — Z88.6: ICD-10-CM

## 2020-03-31 DIAGNOSIS — C15.9: Primary | ICD-10-CM

## 2020-03-31 DIAGNOSIS — C34.90: ICD-10-CM

## 2020-03-31 DIAGNOSIS — J44.9: ICD-10-CM

## 2020-03-31 DIAGNOSIS — C50.929: ICD-10-CM

## 2020-03-31 DIAGNOSIS — I10: ICD-10-CM

## 2020-03-31 LAB
ALBUMIN SERPL-MCNC: 3.1 G/DL (ref 3.5–5)
ALBUMIN/GLOB SERPL: 0.7 {RATIO} (ref 0.8–2)
ALP SERPL-CCNC: 64 IU/L (ref 40–150)
ALT SERPL-CCNC: 13 IU/L (ref 0–55)
ANION GAP SERPL CALC-SCNC: 10.7 MMOL/L (ref 8–16)
BASOPHILS # BLD AUTO: 0.1 10*3/UL (ref 0–0.1)
BASOPHILS NFR BLD AUTO: 0.9 % (ref 0–1)
BUN SERPL-MCNC: 11 MG/DL (ref 7–26)
BUN/CREAT SERPL: 12 (ref 6–25)
CALCIUM SERPL-MCNC: 9.6 MG/DL (ref 8.4–10.2)
CHLORIDE SERPL-SCNC: 104 MMOL/L (ref 98–107)
CO2 SERPL-SCNC: 30 MMOL/L (ref 22–29)
DEPRECATED NEUTROPHILS # BLD AUTO: 6.7 10*3/UL (ref 2.1–6.9)
EGFRCR SERPLBLD CKD-EPI 2021: > 60 ML/MIN (ref 60–?)
EOSINOPHIL # BLD AUTO: 0.4 10*3/UL (ref 0–0.4)
EOSINOPHIL NFR BLD AUTO: 4.3 % (ref 0–6)
ERYTHROCYTE [DISTWIDTH] IN CORD BLOOD: 12.5 % (ref 11.7–14.4)
GLOBULIN PLAS-MCNC: 4.5 G/DL (ref 2.3–3.5)
GLUCOSE SERPLBLD-MCNC: 110 MG/DL (ref 74–118)
HCT VFR BLD AUTO: 34.6 % (ref 38.2–49.6)
HGB BLD-MCNC: 10.8 G/DL (ref 14–18)
LYMPHOCYTES # BLD: 1 10*3/UL (ref 1–3.2)
LYMPHOCYTES NFR BLD AUTO: 10.8 % (ref 18–39.1)
MCH RBC QN AUTO: 30.3 PG (ref 28–32)
MCHC RBC AUTO-ENTMCNC: 31.2 G/DL (ref 31–35)
MCV RBC AUTO: 96.9 FL (ref 81–99)
MONOCYTES # BLD AUTO: 1 10*3/UL (ref 0.2–0.8)
MONOCYTES NFR BLD AUTO: 10.8 % (ref 4.4–11.3)
NEUTS SEG NFR BLD AUTO: 72.9 % (ref 38.7–80)
PLATELET # BLD AUTO: 398 X10E3/UL (ref 140–360)
POTASSIUM SERPL-SCNC: 4.7 MMOL/L (ref 3.5–5.1)
RBC # BLD AUTO: 3.57 X10E6/UL (ref 4.3–5.7)
SODIUM SERPL-SCNC: 140 MMOL/L (ref 136–145)

## 2020-03-31 PROCEDURE — 71046 X-RAY EXAM CHEST 2 VIEWS: CPT

## 2020-03-31 PROCEDURE — 36415 COLL VENOUS BLD VENIPUNCTURE: CPT

## 2020-03-31 PROCEDURE — 80053 COMPREHEN METABOLIC PANEL: CPT

## 2020-03-31 PROCEDURE — 85025 COMPLETE CBC W/AUTO DIFF WBC: CPT

## 2020-03-31 PROCEDURE — 76000 FLUOROSCOPY <1 HR PHYS/QHP: CPT

## 2020-03-31 PROCEDURE — 36561 INSERT TUNNELED CV CATH: CPT

## 2020-03-31 NOTE — OPERATIVE REPORT
DATE OF PROCEDURE:  03/31/2020

 

SURGEON:  Edson Wyatt MD

 

PREOPERATIVE DIAGNOSIS:  Esophageal cancer, needing IV access for chemotherapy.

 

POSTOPERATIVE DIAGNOSIS:  Esophageal cancer, needing IV access for chemotherapy.

 

OPERATION PERFORMED:  Placement of left subclavian venous access port under C-arm

guidance. 

 

ASSISTANT:  DEBORAH Ruth.

 

ANESTHESIA:  Local 1% Xylocaine and MAC.

 

COMPLICATIONS:  None.

 

ESTIMATED BLOOD LOSS:  Minimal.

 

PROCEDURE IN DETAIL:  With the patient lying in bed in the Trendelenburg position under

good IV sedation, the left chest and neck were prepped with Betadine solution and draped

in the usual manner.  A standard left subclavian venipuncture was performed without any

difficulty and a guidewire was advanced into central venous position.  Using the C-arm,

the tip of the guidewire was confirmed to be at the level of the superior vena cava.

The left anterior chest was then infiltrated with 1% Xylocaine and an incision was made

and a pocket was created without any difficulty.  The catheter was then threaded to the

subclavian position.  The reservoir was then anchored to the anterior chest wall using

interrupted sutures of 2-0 silk.  The reservoir and catheter were fully heparinized and

the catheter was cut to the appropriate length.  The peel-away sheath introducer was

then placed over the guidewire.  The guidewire was removed and the catheter was threaded

through the peel-away sheath introducer.  The peel-away sheath was then removed.  There

was good blood return and the reservoir and catheter were fully heparinized.  Using the

C-arm, the tip of the catheter was confirmed to be at the level of the superior vena

cava and the left lung was fully expanded.  The wounds were then closed in layers.  The

subcutaneous tissue was approximated with 3-0 and 4-0 Vicryl and the skin was closed

with subcuticular 5-0 Vicryl.  Benzoin, Steri-Strips, and dressings were applied.  The

sponge, lap, and needle counts were correct.  The patient tolerated the procedure well

and returned to the recovery room in stable condition. 

 

 

 

 

______________________________

MD BOWEN Santos/MODL

D:  03/31/2020 10:27:43

T:  03/31/2020 10:52:43

Job #:  [id number]

## 2020-03-31 NOTE — DIAGNOSTIC IMAGING REPORT
EXAMINATION:  CHEST 2 VIEWS    



INDICATION: Preop, MediPort placement 

^DR ORDERS

^[id number]

^0729

^DAY SURGERY ROOM 3 STAT



COMPARISON:  CT chest 8/12/2018

     

FINDINGS:  PA and lateral views



TUBES and LINES:  None.



LUNGS:  Diffuse hyperinflation.  Rounded soft tissue mass in the medial left

upper lobe measures 5.2 x 4.2 cm. A poorly defined soft tissue density in the

lower lobe on the lateral image measures 2.8 x 2.6 cm. 



PLEURA:  There are bilateral skin folds along the lateral aspect of the chest.

No pleural effusions. No pneumothorax.



HEART AND MEDIASTINUM:  The cardiomediastinal silhouette is unremarkable..  



BONES AND SOFT TISSUES: The bones are diffusely demineralized. Stable

dextroscoliosis of the lower thoracic spine with superimposed endplate

degenerative changes. No focal osseous lesions.   Surgical clips in the right

axilla are stable.



UPPER ABDOMEN: Cholecystectomy clips in the right upper quadrant.



IMPRESSION: 



1. Left upper lobe pulmonary mass concerning for neoplasm. A lower lobe opacity

visible only on lateral image may also represent neoplasm.



2. Pulmonary hyperinflation suggestive of small airways disease.









Signed by: Dr. Ghazala Arredondo MD on 3/31/2020 7:56 AM

## 2020-04-09 ENCOUNTER — HOSPITAL ENCOUNTER (EMERGENCY)
Dept: HOSPITAL 88 - ER | Age: 75
Discharge: HOME | End: 2020-04-09
Payer: MEDICARE

## 2020-04-09 VITALS — WEIGHT: 140 LBS | BODY MASS INDEX: 20.04 KG/M2 | HEIGHT: 70 IN

## 2020-04-09 DIAGNOSIS — Z85.118: ICD-10-CM

## 2020-04-09 DIAGNOSIS — Z85.3: ICD-10-CM

## 2020-04-09 DIAGNOSIS — R50.9: Primary | ICD-10-CM

## 2020-04-09 PROCEDURE — 99282 EMERGENCY DEPT VISIT SF MDM: CPT

## 2020-04-09 PROCEDURE — 87635 SARS-COV-2 COVID-19 AMP PRB: CPT

## 2020-04-09 PROCEDURE — 87400 INFLUENZA A/B EACH AG IA: CPT

## 2020-09-02 ENCOUNTER — HOSPITAL ENCOUNTER (INPATIENT)
Dept: HOSPITAL 88 - ER | Age: 75
LOS: 7 days | Discharge: HOSPICE | DRG: 374 | End: 2020-09-09
Attending: INTERNAL MEDICINE | Admitting: INTERNAL MEDICINE
Payer: MEDICARE

## 2020-09-02 VITALS — BODY MASS INDEX: 15.89 KG/M2 | HEIGHT: 70 IN | WEIGHT: 111 LBS

## 2020-09-02 VITALS — SYSTOLIC BLOOD PRESSURE: 149 MMHG | DIASTOLIC BLOOD PRESSURE: 79 MMHG

## 2020-09-02 VITALS — DIASTOLIC BLOOD PRESSURE: 79 MMHG | SYSTOLIC BLOOD PRESSURE: 149 MMHG

## 2020-09-02 VITALS — DIASTOLIC BLOOD PRESSURE: 69 MMHG | SYSTOLIC BLOOD PRESSURE: 119 MMHG

## 2020-09-02 DIAGNOSIS — I10: ICD-10-CM

## 2020-09-02 DIAGNOSIS — C15.9: Primary | ICD-10-CM

## 2020-09-02 DIAGNOSIS — Z66: ICD-10-CM

## 2020-09-02 DIAGNOSIS — C50.929: ICD-10-CM

## 2020-09-02 DIAGNOSIS — C34.12: ICD-10-CM

## 2020-09-02 DIAGNOSIS — E87.6: ICD-10-CM

## 2020-09-02 DIAGNOSIS — Z90.49: ICD-10-CM

## 2020-09-02 DIAGNOSIS — J44.9: ICD-10-CM

## 2020-09-02 DIAGNOSIS — Z87.891: ICD-10-CM

## 2020-09-02 DIAGNOSIS — I25.10: ICD-10-CM

## 2020-09-02 DIAGNOSIS — E87.0: ICD-10-CM

## 2020-09-02 DIAGNOSIS — E16.2: ICD-10-CM

## 2020-09-02 DIAGNOSIS — N17.9: ICD-10-CM

## 2020-09-02 DIAGNOSIS — Z88.5: ICD-10-CM

## 2020-09-02 DIAGNOSIS — E43: ICD-10-CM

## 2020-09-02 DIAGNOSIS — Z11.59: ICD-10-CM

## 2020-09-02 DIAGNOSIS — C79.51: ICD-10-CM

## 2020-09-02 DIAGNOSIS — D63.0: ICD-10-CM

## 2020-09-02 LAB
ALBUMIN SERPL-MCNC: 3.6 G/DL (ref 3.5–5)
ALBUMIN/GLOB SERPL: 0.8 {RATIO} (ref 0.8–2)
ALP SERPL-CCNC: 86 IU/L (ref 40–150)
ALT SERPL-CCNC: 17 IU/L (ref 0–55)
ANION GAP SERPL CALC-SCNC: 17.1 MMOL/L (ref 8–16)
BACTERIA URNS QL MICRO: (no result) /HPF
BASOPHILS # BLD AUTO: 0.1 10*3/UL (ref 0–0.1)
BASOPHILS NFR BLD AUTO: 0.3 % (ref 0–1)
BILIRUB UR QL: (no result)
BUN SERPL-MCNC: 27 MG/DL (ref 7–26)
BUN/CREAT SERPL: 18 (ref 6–25)
CALCIUM SERPL-MCNC: 10.1 MG/DL (ref 8.4–10.2)
CHLORIDE SERPL-SCNC: 104 MMOL/L (ref 98–107)
CLARITY UR: CLEAR
CO2 SERPL-SCNC: 26 MMOL/L (ref 22–29)
COLOR UR: YELLOW
DEPRECATED NEUTROPHILS # BLD AUTO: 12.9 10*3/UL (ref 2.1–6.9)
DEPRECATED RBC URNS MANUAL-ACNC: (no result) /HPF (ref 0–5)
EGFRCR SERPLBLD CKD-EPI 2021: 47 ML/MIN (ref 60–?)
EOSINOPHIL # BLD AUTO: 0 10*3/UL (ref 0–0.4)
EOSINOPHIL NFR BLD AUTO: 0.3 % (ref 0–6)
EPI CELLS URNS QL MICRO: (no result) /LPF
ERYTHROCYTE [DISTWIDTH] IN CORD BLOOD: 13.6 % (ref 11.7–14.4)
GLOBULIN PLAS-MCNC: 4.6 G/DL (ref 2.3–3.5)
GLUCOSE SERPLBLD-MCNC: 120 MG/DL (ref 74–118)
HCT VFR BLD AUTO: 39.9 % (ref 38.2–49.6)
HGB BLD-MCNC: 12.1 G/DL (ref 14–18)
HYALINE CASTS #/AREA URNS LPF: (no result) /[LPF] (ref 0–1)
KETONES UR QL STRIP.AUTO: (no result)
LEUKOCYTE ESTERASE UR QL STRIP.AUTO: NEGATIVE
LYMPHOCYTES # BLD: 0.5 10*3/UL (ref 1–3.2)
LYMPHOCYTES NFR BLD AUTO: 3.3 % (ref 18–39.1)
MCH RBC QN AUTO: 28 PG (ref 28–32)
MCHC RBC AUTO-ENTMCNC: 30.3 G/DL (ref 31–35)
MCV RBC AUTO: 92.4 FL (ref 81–99)
MONOCYTES # BLD AUTO: 1.3 10*3/UL (ref 0.2–0.8)
MONOCYTES NFR BLD AUTO: 8.4 % (ref 4.4–11.3)
MUCOUS THREADS URNS QL MICRO: (no result)
NEUTS SEG NFR BLD AUTO: 87 % (ref 38.7–80)
NITRITE UR QL STRIP.AUTO: NEGATIVE
PLATELET # BLD AUTO: 277 X10E3/UL (ref 140–360)
POTASSIUM SERPL-SCNC: 5.1 MMOL/L (ref 3.5–5.1)
PROT UR QL STRIP.AUTO: (no result)
RBC # BLD AUTO: 4.32 X10E6/UL (ref 4.3–5.7)
SODIUM SERPL-SCNC: 142 MMOL/L (ref 136–145)
SP GR UR STRIP: 1.01 (ref 1.01–1.02)
UROBILINOGEN UR STRIP-MCNC: 1 MG/DL (ref 0.2–1)
WBC #/AREA URNS HPF: (no result) /HPF (ref 0–5)

## 2020-09-02 PROCEDURE — 83735 ASSAY OF MAGNESIUM: CPT

## 2020-09-02 PROCEDURE — 85025 COMPLETE CBC W/AUTO DIFF WBC: CPT

## 2020-09-02 PROCEDURE — 80053 COMPREHEN METABOLIC PANEL: CPT

## 2020-09-02 PROCEDURE — 85730 THROMBOPLASTIN TIME PARTIAL: CPT

## 2020-09-02 PROCEDURE — 71045 X-RAY EXAM CHEST 1 VIEW: CPT

## 2020-09-02 PROCEDURE — 97139 UNLISTED THERAPEUTIC PX: CPT

## 2020-09-02 PROCEDURE — 70491 CT SOFT TISSUE NECK W/DYE: CPT

## 2020-09-02 PROCEDURE — 84484 ASSAY OF TROPONIN QUANT: CPT

## 2020-09-02 PROCEDURE — 80048 BASIC METABOLIC PNL TOTAL CA: CPT

## 2020-09-02 PROCEDURE — 81001 URINALYSIS AUTO W/SCOPE: CPT

## 2020-09-02 PROCEDURE — 85610 PROTHROMBIN TIME: CPT

## 2020-09-02 PROCEDURE — 83880 ASSAY OF NATRIURETIC PEPTIDE: CPT

## 2020-09-02 PROCEDURE — 49440 PLACE GASTROSTOMY TUBE PERC: CPT

## 2020-09-02 PROCEDURE — 84443 ASSAY THYROID STIM HORMONE: CPT

## 2020-09-02 PROCEDURE — 84100 ASSAY OF PHOSPHORUS: CPT

## 2020-09-02 PROCEDURE — 36415 COLL VENOUS BLD VENIPUNCTURE: CPT

## 2020-09-02 PROCEDURE — 83036 HEMOGLOBIN GLYCOSYLATED A1C: CPT

## 2020-09-02 PROCEDURE — 99284 EMERGENCY DEPT VISIT MOD MDM: CPT

## 2020-09-02 PROCEDURE — 43246 EGD PLACE GASTROSTOMY TUBE: CPT

## 2020-09-02 PROCEDURE — 99153 MOD SED SAME PHYS/QHP EA: CPT

## 2020-09-02 PROCEDURE — 74470 X-RAY EXAM OF KIDNEY LESION: CPT

## 2020-09-02 PROCEDURE — 71260 CT THORAX DX C+: CPT

## 2020-09-02 PROCEDURE — 99152 MOD SED SAME PHYS/QHP 5/>YRS: CPT

## 2020-09-02 PROCEDURE — 82948 REAGENT STRIP/BLOOD GLUCOSE: CPT

## 2020-09-02 RX ADMIN — DOCUSATE SODIUM SCH MG: 100 TABLET, FILM COATED ORAL at 17:31

## 2020-09-02 RX ADMIN — SODIUM CHLORIDE SCH MLS/HR: 9 INJECTION, SOLUTION INTRAVENOUS at 14:23

## 2020-09-02 RX ADMIN — FAMOTIDINE SCH MG: 10 INJECTION, SOLUTION INTRAVENOUS at 17:39

## 2020-09-02 NOTE — DIAGNOSTIC IMAGING REPORT
EXAM: CT Chest WITH intravenous contrast 9/2/2020 1:47 PM

INDICATION:  

COMPARISON: X-ray dated the same day. CT chest dated 8/12/2018

TECHNIQUE:

Chest was scanned utilizing a multidetector helical scanner from the lung apex

through the level of the adrenal glands following administration of IV

contrast. Coronal and sagittal reformations were obtained. Routine protocol was

performed.



IV CONTRAST: 100mL Isovue 370

RADIATION DOSE: Total DLP: 902 mGy*cm. Dose modulation, iterative

reconstruction, and/or weight based adjustment of the mA/kV was utilized to

reduce the radiation dose to as low as reasonably achievable. 

COMPLICATIONS: None



FINDINGS:



LINES/ TUBES: None.



LUNGS AND AIRWAYS: Diffuse emphysematous changes are noted with hyperexpansion

of the lungs. There are multiple large bilateral pulmonary masses concerning

for metastatic disease.



Left upper lobe: The largest is noted within the left upper lobe with central

necrosis measuring 4.3 x 3.2 cm.

Additional large mass is identified within the left upper lobe along the

mediastinum measuring 4.3 x 3.4 with questionable mediastinal invasion.

Left lower lobe: Largest mass measures 3.2 x 2.4 cm. 

Right upper lobe: In the inferior segment of the right upper lobe there is a

1.6 cm mass. Within the right middle lobe: along the minor fissure there is a 1

cm mass.

Right lower lobe: largest mass measures approximately 3.1 x 2.5 cm.

Negative for focal consolidation or suspicious infectious infiltrate.



PLEURA: Negative for pneumothorax or pleural effusion.



HEART AND MEDIASTINUM: The thyroid gland is normal.  No mediastinal, hilar or

axillary lymphadenopathy.  The heart is normal in size.. There is no

pericardial effusion.  Coronary artery calcifications are noted.

Negative for central or segmental pulmonary embolism. Pulmonary artery is of

normal caliber. Thoracic aorta is of normal caliber with significant

atherosclerotic disease of the aortic arch. There is also significant calcified

and noncalcified atherosclerotic disease of the descending thoracic aorta and

upper abdominal aorta.

At the upper aspect of the mediastinum there is a heterogeneous enhancing mass

centered within the esophagus. See report of the neck dated the same day for

further evaluation.





UPPER ABDOMEN: Postcholecystectomy changes. Moderate central intrahepatic and

extra hepatic biliary dilatation.



BONES: Osseous structures are diffusely demineralized. There are severe diffuse

degenerative changes of the cervical spine from C3-4 through C7-T1. Moderate to

severe multilevel degenerative changes of the mid to lower thoracic and upper

lumbar spine are also noted.



SOFT TISSUES: Diffuse loss of the superficial soft tissue fat.



IMPRESSION: 



1. Multiple bilateral large pulmonary nodules/masses within all lobes

concerning for metastatic disease. A few of the larger lesions do demonstrate

central necrosis. The largest lesions are within the left apex and left medial

upper lobe along the mediastinum.

2. Stable background of severe emphysematous changes. Negative for effusion,

pneumothorax or suspicious infectious infiltrate.

3. Severe atherosclerotic changes of the thoracic and upper abdominal aorta

with calcified and noncalcified adverse chronic plaque disease. No definite

aneurysmal dilatation is noted.

4. Negative for central pulmonary embolism.

5. Postcholecystectomy changes with moderate central and extrahepatic biliary

dilatation, likely normal for patient's age and postcholecystectomy state.

6. See CT neck dated the same day for details on the enhancing esophageal mass.



Signed by: Pranav Long MD on 9/2/2020 3:19 PM

## 2020-09-02 NOTE — DIAGNOSTIC IMAGING REPORT
Examination:CT SOFT TISSUE NECK WITH CONTRAST



History: Dysphagia

Comparison studies: None



Technique: 

Axial images from the skull base to the thoracic inlet

Coronal and sagittal reformatted images.

Dose modulation, iterative reconstruction, and/or weight based adjustment of

the mA/kV was utilized to reduce the radiation dose to as low as reasonably

achievable. 

Intravenous contrast: 100mL of Isovue-370.



Findings:



Soft tissues:

No abnormalities.



Aerodigestive tract: 

A 6.3 x 3.2 x 2.6 cm (superoinferior x anteroposterior x transverse dimensions)

heterogeneous enhancing soft tissue in the hypopharynx and cervical esophagus

that occludes the esophagus.



Lymph nodes:

No radiographically significant adenopathy.



Vessels: 

Arteries and veins are patent.



Thyroid gland: 

Normal in size and homogeneous.



Submandibular glands:

Normal in size and homogeneous.



Parotid glands:

Bilaterally atrophic.



Orbits: Bilateral slitlike orbital lenses.

Paranasal sinuses: Clear.

Temporal bones: No abnormalities.

Skull base and facial bones:  Intact.



Cervical spine: 

Heterogeneity of the cervical spine concerning for metastasis from C3 through

C7.

C4-C5: Severe canal stenosis due to central and right central disc protrusion. 

C5-C6: Severe bilateral neural foraminal narrowing and mild canal stenosis due

to disc osteophyte complex and bilateral uncovertebral arthropathy. 

Visualized lung apices:

Several partially visualized nodules are also seen in the bilateral lungs with

the largest measuring 3.4 x 5.5 cm (anteroposterior by transverse dimension) in

the left upper lobe and are all concerning for metastasis. 



IMPRESSION:



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 



Multiple pulmonary nodules, concerning for metastases, the largest of which in

the left upper lobe and measures 3.4 x 5.5 cm.



Lytic and sclerotic lesions of the cervical spine (C3-C7) are also concerning

for metastasis.





Signed by: Dr. Bella Gann M.D. on 9/2/2020 2:48 PM

## 2020-09-02 NOTE — NUR
Pt received from ER at this time. Pt is aox4 and able to verbalize needs. Denies any pain at 
this time. Pt will continue on IVF at 100ml/hr and he is tolerating well at this time. 
Breaths are even and unlabored on room air.

## 2020-09-02 NOTE — NUR
Patient received lying in bed. AAO x 3. Patient had no complaints of pain. Respirations even 
and non-labored. IVF infusing at 100 cc/hr.  Safety measures implemented.  Patient 
instructed to call for assistance when needed. Call light within reach.

## 2020-09-02 NOTE — XMS REPORT
Continuity of Care Document

                             Created on: 2020



ANI GUALLPA

External Reference #: [id number]

: 1945

Sex: Male



Demographics





                          Address                   [address] [place]  [address]

 

                          Home Phone                [phone]

 

                          Preferred Language        English

 

                          Marital Status            Unknown

 

                          Voodoo Affiliation     Unknown

 

                          Race                      Unknown

 

                                        Additional Race(s) 

White



 

                          Ethnic Group              Unknown





Author





                          Author                    [facility]

 

                          Address                   [address]

[place]  [address]



 

                          Phone                     Unavailable







Support





                Name            Relationship    Address         Phone

 

                    KAMRYN GUALLPA    PRS                 [address] [place]  [id number] [phone]

 

                    SHABANA SCHULER       PRS                 [address] [place]  [address] [phone]







Care Team Providers





                    Care Team Member Name Role                Phone

 

                    NONSTAFF            PCP                 Unavailable

 

                    NANDO CA  Attphys             Unavailable

 

                    KENYETTA,  XANDER     Attphys             Unavailable

 

                    PETE RAMIREZ YICHING      Attphys             Unavailable

 

                    KENYETTA,  XANDER     Admphys             Unavailable

 

                    PETE RAMIREZ YICHING      Admphys             Unavailable







Payers





           Payer Name Policy Type Policy Number Effective Date Expiration Date S

ourlesa

 

           [facility] [employer]            [id number]  2013 00:00:00            

[facility]







Problems





           Condition Name Condition Details Condition Category Status     Onset 

Date Resolution

Date            Last Treatment Date Treating Clinician Comments        Source

 

                    Secondary malignant neoplasm of bone Secondary Malignant Girma

plasm of Bone 

Problem   Active    2018 00:00:00                                         

[facility]

 

             Atherosclerosis of aorta Atherosclerosis of Aorta Problem      Acti

ve       2018 

00:00:00                                                         [facility]

 

             Malignant tumor of lung Malignant Tumor of Lung Problem      Active

       2017 

00:00:00                                                         [facility]

 

          Hypertriglyceridemia Hypertriglyceridemia Problem   Active     00:00:00            

                                                            [facility]

ice

 

       Anxiety Anxiety Problem Active 2017 00:00:00                       

      [facility]

 

             Benign essential hypertension Benign Essential Hypertension Problem

      Active       

2017 00:00:00                                                     [facility]

 

        Pulmonary emphysema Pulmonary Emphysema Problem Active  2017 00:00

:00                  

                                                    [facility]

 

                    History of malignant neoplasm of breast History of Malignant

 Neoplasm of Breast 

Problem   Active    2017 00:00:00                                         

[facility]

 

       Small bowel obstruction Small bowel obstruction Problem Active           

                         [facility]







Allergies, Adverse Reactions, Alerts





        Allergy Name Allergy Type Status  Severity Reaction(s) Onset Date Inacti

ve Date 

Treating Clinician        Comments                  Source

 

       Morphine Allergy to Substance Active               2020 00:00:00   

                   [facility]

 

       morphine DA     Active MO            2020 00:00:00                 

     [facility]

 

       thimerosal DA     Active U             2011 00:00:00               

       [facility]

 

       Augmentin Allergy to substance Active        Diarrhea                    

         [facility]

 

       Cefuroxime Allergy to substance Active        Diarrhea                   

          [facility]







Social History





                Smoking Status  Start Date      Stop Date       Source

 

                Heavy Tobacco Smoker                                 [facility]







Medications





             Ordered Medication Name Filled Medication Name Start Date   Stop Da

te    Current 

Medication? Ordering Clinician Indication Dosage     Frequency  Signature (SIG) 

Comments                  Components                Source

 

                          Bactrim  mg-160 mg tablet Take 1 tablet twice a 

day by oral route. Bactrim

 mg-160 mg tablet Take 1 tablet twice a day by oral route.                

     No                            1         

BID                 Bactrim  mg-160 mg tablet Take 1 tablet twice a day by

 oral route.                      

                                        [facility]

 

                                        Breo Ellipta 200 mcg-25 mcg/dose powder 

for inhalation INHALE 1 PUFF BY MOUTH 

EVERY DAY AS DIRECTED                   Breo Ellipta 200 mcg-25 mcg/dose powder 

for inhalation 

INHALE 1 PUFF BY MOUTH EVERY DAY AS DIRECTED                 No                 

                     Breo Ellipta 200 

mcg-25 mcg/dose powder for inhalation INHALE 1 PUFF BY MOUTH EVERY DAY AS 
DIRECTED                                                    [facility]

ice

 

                                        clotrimazole-betamethasone 1 %-0.05 % to

pical cream APPLY TO THE AFFECTED AND 

SURROUNDING AREAS OF SKIN BY TOPICAL ROUTE 2 TIMES PER DAY IN THE MORNING AND 
EVENING FOR 2 WEEKS                     clotrimazole-betamethasone 1 %-0.05 % to

pical cream APPLY TO

THE AFFECTED AND SURROUNDING AREAS OF SKIN BY TOPICAL ROUTE 2 TIMES PER DAY IN 
THE MORNING AND EVENING FOR 2 WEEKS                 No                          

            clotrimazole-betamethasone 1 

%-0.05 % topical cream APPLY TO THE AFFECTED AND SURROUNDING AREAS OF SKIN BY 
TOPICAL ROUTE 2 TIMES PER DAY IN THE MORNING AND EVENING FOR 2 WEEKS            

                             [facility]

 

                                        Combivent Respimat 20 mcg-100 mcg/actuat

ion solution for inhalation 1 puff 4 

times a day                             Combivent Respimat 20 mcg-100 mcg/actuat

ion solution for inhalation 

1 puff 4 times a day                 No                                      Com

bivent Respimat 20 mcg-100 mcg/actuation 

solution for inhalation 1 puff 4 times a day                                    

     [facility]

 

                          erythromycin with ethanol 2 % topical solution erythro

mycin with ethanol 2 % 

topical solution                 No                                      erythro

mycin with ethanol 2 % topical solution  

                                                    [facility]

 

                                        hydrocodone 7.5 mg-acetaminophen 325 mg 

tablet Take 1 tablet every 6 hours by 

oral route.                             hydrocodone 7.5 mg-acetaminophen 325 mg 

tablet Take 1 tablet every 6

hours by oral route.                 No                                      hyd

rocodone 7.5 mg-acetaminophen 325 mg 

tablet Take 1 tablet every 6 hours by oral route.                               

          [facility]

 

                          loratadine 10 mg capsule Take 1 capsule every day by o

ral route. loratadine 10 

mg capsule Take 1 capsule every day by oral route.                 No           

           1capsule(s) Q1D     

loratadine 10 mg capsule Take 1 capsule every day by oral route.                

                         [facility]

 

                          lorazepam 1 mg tablet Take 1 tablet every day by oral 

route. lorazepam 1 mg 

tablet Take 1 tablet every day by oral route.                 No                

                      lorazepam 1 mg 

tablet Take 1 tablet every day by oral route.                                   

      [facility]

 

                          multivitamin tablet Take 1 tablet every day by oral ro

jose. multivitamin tablet 

Take 1 tablet every day by oral route.                 No                      1

       Q1D     multivitamin tablet Take

1 tablet every day by oral route.                                         Antonella

[facility]

 

                          PreviDent 5000 Booster Plus 1.1 % dental paste i daily

 PreviDent 5000 Booster 

Plus 1.1 % dental paste i daily                 No                              

        PreviDent 5000 Booster Plus 1.1 %

dental paste i daily                                         [facility]

opal

 

        Tarceva 150 mg tablet 1 tab daily Tarceva 150 mg tablet 1 tab daily     

            No                       

                          Tarceva 150 mg tablet 1 tab daily                     

      [facility]

 

                          Viagra 100 mg tablet TAKE 1 TABLET BY MOUTH AS DIRECTE

D Viagra 100 mg tablet 

TAKE 1 TABLET BY MOUTH AS DIRECTED                 No                           

           Viagra 100 mg tablet TAKE 1 

TABLET BY MOUTH AS DIRECTED                                         [facility]

 

       Vitamin B12 1 tab a day Vitamin B12 1 tab a day               No         

                        Vitamin B12 1 tab

a day                                                       [facility]

ice

 

       Vitamin C 1 tab a day Vitamin C 1 tab a day               No             

                    Vitamin C 1 tab a day

                                                            [facility]

ice

 

       vitamin E 1 tab a day vitamin E 1 tab a day               No             

                    vitamin E 1 tab a day

                                                            [facility]

ice

 

                          Albuterol Sulfate (Ventolin Hfa) 18 Gm Hfa.aer.ad Albu

terol Sulfate (Ventolin 

Hfa) 18 Gm Hfa.aer.ad             Yes                           As Needed       

      [facility]

 

                    Ascorbic Acid (Vitamin C) 60 Mg Lozenge Ascorbic Acid (Vitam

in C) 60 Mg Lozenge 

              Yes                  1             Daily                [facility]

 

             Aspirin (Aspir-Maude) 325 Mg Tablet. Aspirin (Aspir-Maude) 325 Mg T

ablet.                           

Yes                     81              Bedtime                 [facility]

 

                          Bifidobacterium Infantis (Align) 4 Mg Capsule Bifidoba

cterium Infantis (Align) 4

Mg Capsule             Yes               1           Bedtime             [facility]

 

       Dicyclomine Hcl 10 Mg Capsule Dicyclomine Hcl 10 Mg Capsule              

 Yes                  1             Four

Times Daily                                                 [facility]

 

     Famotidine 20 Mg Tab Famotidine 20 Mg Tab           Yes            40      

  Daily           [facility]

 

                          Hydrocodone Bit/Acetaminophen (Hydrocodon-Acetaminoph 

7.5-300) 1 Each Tablet 

Hydrocodone Bit/Acetaminophen (Hydrocodon-Acetaminoph 7.5-300) 1 Each Tablet    

                        

        Yes                     7.5             As Needed                 [facility]

 

             Linaclotide (Linzess) 72 Mcg Capsule Linaclotide (Linzess) 72 Mcg C

apsule                           

Yes                                     Daily                   [facility]

 

     Loratadine 10 Mg Tablet Loratadine 10 Mg Tablet           Yes            10

        Bedtime           [facility]

 

       Lorazepam (Ativan) 1 Mg Tablet Lorazepam (Ativan) 1 Mg Tablet            

   Yes                  1             As

Needed as needed for Anxiety                                         [facility]

 

                          Multivitamin (Multivitamins) 1 Each Capsule Multivitam

in (Multivitamins) 1 Each 

Capsule             Yes               1           Daily             [facility]

 

                          Vitamin E Mixed (Vitamin E) 400 Unit Capsule Vitamin E

 Mixed (Vitamin E) 400 

Unit Capsule             Yes                           Daily             [facility]

 

                          Wheat Dextrin (Benefiber) 1 Each Powd.pack Wheat Dextr

in (Benefiber) 1 Each 

Powd.pack             Yes                           Daily             [facility]

 

                          Albuterol/Ipratropium (Combivent Inhaler) 14.7 Gm Aero

, 1 Inh Inhalation 

Albuterol/Ipratropium (Combivent Inhaler) 14.7 Gm Aero, 1 Inh Inhalation        

                   

2020 00:00:00 No                      1               Four Times Daily    

             [facility]

 

                          Clemastine Fumarate (Allergy Relief) 1.34 Mg Tablet, 1

 Tab Oral Clemastine 

Fumarate (Allergy Relief) 1.34 Mg Tablet, 1 Tab Oral              2020 00:

00:00 No                        

           1                     Daily                            [facility]

 

                          Tramadol Hcl (Ultram) 50 Mg Tablet, 50 Mg Oral Tramado

l Hcl (Ultram) 50 Mg 

Tablet, 50 Mg Oral         2020 00:00:00 No                      50       

       Every 6 Hours as needed for

Pain                                                        [facility]

 

                Erlotinib Hcl (Tarceva) 150 Mg Tablet, Erlotinib Hcl (Tarceva) 1

50 Mg Tablet,                 

2020 00:00:00 No                                                          

    [facility]

 

                          Levofloxacin (Levaquin) 500 Mg Tablet, 500 Mg Oral Lev

ofloxacin (Levaquin) 500 

Mg Tablet, 500 Mg Oral       2020 00:00:00 No                500         D

aily             [facility]

 

                Prednisone 10 Mg Tab, 10 Mg Oral Prednisone 10 Mg Tab, 10 Mg Ora

l                 2020 

00:00:00 No                      10              As Directed                 [facility]

 

                          Ranitidine Hcl (Acid Control) 75 Mg Tablet, 75 Mg Oral

 Ranitidine Hcl (Acid 

Control) 75 Mg Tablet, 75 Mg Oral       2020 00:00:00 No                75

          Bedtime             

[facility]

 

                Gemfibrozil 600 Mg Tablet, 600 Mg Oral Gemfibrozil 600 Mg Tablet

, 600 Mg Oral                 

2018 00:00:00 No                      600             Bedtime             

    [facility]

 

                          Metoprolol Tartrate 50 Mg Tablet, 50 Mg Oral Metoprolo

l Tartrate 50 Mg Tablet, 

50 Mg Oral       2018 00:00:00 No                50          Bedtime      

       [facility]

 

                          Verapamil Hcl (Verapamil Er) 240 Mg Tabsr, 240 Mg Oral

 Verapamil Hcl (Verapamil 

Er) 240 Mg Tabsr, 240 Mg Oral       2018 00:00:00 No                240   

      Bedtime             [facility]

 

                          Azithromycin (Zithromax) 500 Mg Tablet, 500 Mg Oral Az

ithromycin (Zithromax) 500

Mg Tablet, 500 Mg Oral       2013 00:00:00 No                500         T

wice A Day             [facility]

 

     Metoprolol , Metoprolol ,      2013 00:00:00 [facility]

 

     Verapamil , Verapamil ,      2013 00:00:00 [facility]







Immunizations





           Ordered Immunization Name Filled Immunization Name Date       Status 

    Comments   Source

 

                pneumococcal conjugate PCV 13 pneumococcal conjugate PCV 13 2018 14:05:14 

Completed                                           [facility]

 

                    influenza, seasonal, injectable influenza, seasonal, injecta

ble 2017 

13:21:16            Completed                               [facility]

ice

 

                    influenza, injectable, quadrivalent influenza, injectable, q

uadrivalent 

2015-09-10 00:00:00 Completed                               [facility]

ice

 

                    pneumococcal polysaccharide PPV23 pneumococcal polysaccharid

e PPV23 2013 

00:00:00            Completed                               [facility]

ice







Vital Signs





             Vital Name   Observation Time Observation Value Comments     Source

 

             BP Diastolic 2019 00:00:00 80 mm[Hg]                 [facility]

 

             Height       2019 00:00:00 70 [in_i]                 [facility]

 

             BMI (Body Mass Index) 2019 00:00:00 22.4 kg/m2               

 [facility]

 

             BP Systolic  2019 00:00:00 130 mm[Hg]                [facility]

 

             Body Weight  2019 00:00:00 156 [lb_av]               [facility]







Procedures





                Procedure       Date / Time Performed Performing Clinician Sourc

e

 

                INSERT TUNNELED CV CATH 2020 00:00:00 CARSON CA

[facility]

 

                X-ray of chest, two views 2020 00:00:00 CARSON CA

 [facility]

 

                EGD BIOPSY SINGLE/MULTIPLE 2020 00:00:00 YANICK AVILA

[facility]

 

                Hernia Repair                                   [facility]

ractice

 

                Mastectomy (One Breast)                                 [facility]







Plan of Care





             Planned Activity Planned Date Details      Comments     Source

 

             Instructions                                        [facility]







Encounters





             Start Date/Time End Date/Time Encounter Type Admission Type Attendi

[facility]   Care Department Encounter ID    Source

 

          2020 14:32:00 2020 15:23:00 Departed Emergency Room       

              [facility]                    [id number]              [facility]

 

             2020 06:39:00 2020 06:39:00 Registered Surgical Day Car

e 3            

CARSON CA [facility]          [id number]    [facility]

 

          2020 05:20:00 2020 05:20:00 Registered Surgical Day Care  

                   [facility]                    [id number]              [facility]

 

                    2019 00:00:00 2019 00:00:00 Jeffery Serrato MD: [address] [place] [address], Ph. [phone]                                

 [facility]   [id number]                  [facility]

 

           2018 23:39:00 2018 15:59:00 Discharged Inpatient 1       

   XANDER KUMARI 

[facility]              [id number]        [facility]

 

             2018-08-10 12:45:00 2018 19:29:00 Discharged Inpatient (obs) 

1            MICHAEL RAMIREZ         [facility]          [id number]    [facility]







Results





           Test Description Test Time  Test Comments Results    Result Comments 

Source

 

                    Influenza Virus Types A,B Antigen 2020 15:55:00   

 

                                        Test Item

 

             Influenza Virus Types A,B Antigen (test code = 88087-3) NEGATIVE   

  NEGATIVE                   





[facility] [id number] 08:[facility]:00* 



             Test Item    Value        Reference Range Interpretation Comments

 

             Sodium Level (test code = 2951-2) 140          136-145             

       





[facility] [id number] 08:01:00* 



             Test Item    Value        Reference Range Interpretation Comments

 

             Potassium Level (test code = 2823-3) 4.7          3.5-5.1          

          





[facility] [id number] 08:01:00* 



             Test Item    Value        Reference Range Interpretation Comments

 

             Chloride Level (test code = 2075-0) 104                      

         





[facility] Dioxide [id number] 08:01:00* 



             Test Item    Value        Reference Range Interpretation Comments

 

             Carbon Dioxide Level (test code = 2028-9) 30           22-29       

 H             





[facility] Northwest[facility]ion [id number] 08:01:00* 



             Test Item    Value        Reference Range Interpretation Comments

 

             Anion Gap (test code = 33037-3) 10.7         8-16                  

     





[facility] Urea [id number] 08:01:00* 



             Test Item    Value        Reference Range Interpretation Comments

 

             Blood Urea Nitrogen (test code = 3094-0) 11           7-26         

              





[facility]inine2020-03-31 08:01:00* 



             Test Item    Value        Reference Range Interpretation Comments

 

             Creatinine (test code = 2160-0) 0.95         0.72-1.25             

     





[facility] Northwest[facility]UN/Creatinine [id number] 08:01:00* 



             Test Item    Value        Reference Range Interpretation Comments

 

             BUN/Creatinine Ratio (test code = 3097-3) 12           6-        

               





[facility] Northwest[facility]at Glomerular Filtration Rate
2020 08:01:00* 



             Test Item    Value        Reference Range Interpretation Comments

 

             Estimat Glomerular Filtration Rate (test code = 576900988) > 60    

     >60                        





Ranges were taken from the National Kidney Disease Education Program and the [facility] literature.Reference ranges:60 or greater: Uudhmj99-65 (
for 3 consecutive months): Chronic kidney disease 15 or less: Kidney failureCorpus Christi Medical Center NorthwestGlucose [id number] 08:01:00* 



             Test Item    Value        Reference Range Interpretation Comments

 

             Glucose Level (test code = ZNG9224) 110                      

         





[facility] NorthwestCalcium [id number] 08:01:00* 



             Test Item    Value        Reference Range Interpretation Comments

 

             Calcium Level (test code = 61721-4) 9.6          8.4-10.2          

         





[facility] NorthwestTotal Hounhdhjn6968-43-68 08:01:00* 



             Test Item    Value        Reference Range Interpretation Comments

 

             Total Bilirubin (test code = 1975-2) 0.4          0.2-1.2          

          





[facility] NorthwestAspartate Amino Transf (AST/SGOT)
2020 08:01:00* 



             Test Item    Value        Reference Range Interpretation Comments

 

                                        Aspartate Amino Transf (AST/SGOT) (test 

code = Aspartate Amino Transf 

(AST/SGOT))     16              5-34                             





[facility] NorthwestAlanine Aminotransferase (ALT/SGPT)
2020 08:01:00* 



             Test Item    Value        Reference Range Interpretation Comments

 

             Alanine Aminotransferase (ALT/SGPT) (test code = 1742-6) 13        

   0-55                       





[facility] NorthwestTotal Jrfmgwe3937-69-68 08:01:00* 



             Test Item    Value        Reference Range Interpretation Comments

 

             Total Protein (test code = 2885-2) 7.6          6.5-8.1            

        





[facility] NorthwestAlbumin2020-03-31 08:01:00* 



             Test Item    Value        Reference Range Interpretation Comments

 

             Albumin (test code = 1751-7) 3.1          3.5-5.0      L           

  





[facility] NorthwestGlobulin2020-03-31 08:01:00* 



             Test Item    Value        Reference Range Interpretation Comments

 

             Globulin (test code = 78066-4) 4.5          2.3-3.5      H         

    





[facility] NorthwestAlbumin/Globulin Stosb5408-79-28 08:01:00
  * 



             Test Item    Value        Reference Range Interpretation Comments

 

             Albumin/Globulin Ratio (test code = 1759-0) 0.7          0.8-2.0   

   L             





[facility] NorthwestAlkaline Aspmbgpcqan5094-11-84 08:01:00* 



             Test Item    Value        Reference Range Interpretation Comments

 

             Alkaline Phosphatase (test code = 6768-6) 64                 

               





[facility] NorthwestCHEST 2 QMYPK5296-78-51 07:49:00         
                                                                             [facility]                        [address] [place] [address]      Patient Name: ANI GUALLPA      
                             MR #: [id number]                     :                                    Age/Sex: 74/M  Acct #: [id number]    
                          Req #: [id number]  Adm Physician:                     
                                 Ordered by: CARSON CA MD              
              Report #: [id number]        Location: OR                           
           Room/Bed:                     _______________________________________
____________________________________________________________    Procedure: [id number]
[id number] DX/CHEST 2 VIEWS  Exam Date: 20                            Exam Time:
 729                                              REPORT STATUS: Signed    EXAM
INATION:  CHEST 2 VIEWS          INDICATION: Preop, MediPort placement     DR LAURENT COLLIER    [id number]    729    DAY SURGERY ROOM 3 STAT      COMPARISON:  CT chest            FINDINGS:  PA and lateral views      TUBES and LINES:  None. 
     LUNGS:  Diffuse hyperinflation.  Rounded soft tissue mass in the medial lef
t   upper lobe measures 5.2 x 4.2 cm. A poorly defined soft tissue density in th
e   lower lobe on the lateral image measures 2.8 x 2.6 cm.       PLEURA:  There 
are bilateral skin folds along the lateral aspect of the chest.   No pleural eff
usions. No pneumothorax.      HEART AND MEDIASTINUM:  The cardiomediastinal silh
ouette is unremarkable..        BONES AND SOFT TISSUES: The bones are diffusely 
demineralized. Stable   dextroscoliosis of the lower thoracic spine with superim
posed endplate   degenerative changes. No focal osseous lesions.   Surgical clip
s in the right   axilla are stable.      UPPER ABDOMEN: Cholecystectomy clips in
 the right upper quadrant.      IMPRESSION:       1. Left upper lobe pulmonary m
ass concerning for neoplasm. A lower lobe opacity   visible only on lateral imag
e may also represent neoplasm.      2. Pulmonary hyperinflation suggestive of sm
all airways disease.               Signed by: Dr. Ghazala Kidd MD on 3/31/
2020 7:56 AM        Dictated By: GHAZALA KIDD MD  Electronically Signed By:
 GHAZALA KIDD MD on 20  Transcribed By: TRAVIS on 20
       COPY TO:   CARSON CA MD         White Blood Hdwkn3746-02-83 
07:42:00* 



             Test Item    Value        Reference Range Interpretation Comments

 

             White Blood Count (test code = 6690-2) 9.16         4.8-10.8       

            





[facility] NorthwestRed Blood Qkxti8814-58-05 07:42:00* 



             Test Item    Value        Reference Range Interpretation Comments

 

             Red Blood Count (test code = 789-8) 3.57         4.3-5.7      L    

         





[facility] NorthwestHemoglobin2020-03-31 07:42:00* 



             Test Item    Value        Reference Range Interpretation Comments

 

             Hemoglobin (test code = 98303-5) 10.8         14.0-18.0    L       

      





[facility] NorthwestHematocrit2020-03-31 07:42:00* 



             Test Item    Value        Reference Range Interpretation Comments

 

             Hematocrit (test code = 4544-3) 34.6         38.2-49.6    L        

     





[facility] NorthwestMean Corpuscular Vcsmgn9796-13-97 
07:42:00* 



             Test Item    Value        Reference Range Interpretation Comments

 

             Mean Corpuscular Volume (test code = 787-2) 96.9         81-99     

                 





[facility] NorthwestMean Corpuscular Zsbuljznui9794-58-11 
07:42:00* 



             Test Item    Value        Reference Range Interpretation Comments

 

             Mean Corpuscular Hemoglobin (test code = 785-6) 30.3         28-32 

                     





[facility] NorthwestMean Corpuscular Hemoglobin Concent
2020 07:42:00* 



             Test Item    Value        Reference Range Interpretation Comments

 

             Mean Corpuscular Hemoglobin Concent (test code = 786-4) 31.2       

  31-35                      





[facility] NorthwestRed Cell Distribution Effuk7830-23-82 
07:42:00* 



             Test Item    Value        Reference Range Interpretation Comments

 

             Red Cell Distribution Width (test code = 46222-2) 12.5         11.7

-14.4                  





[facility] NorthwestPlatelet Satqa5930-04-63 07:42:00* 



             Test Item    Value        Reference Range Interpretation Comments

 

             Platelet Count (test code = 777-3) 398          140-360      H     

        





[facility] NorthwestNeutrophils (%) (Auto)2020 07:42:00
  * 



             Test Item    Value        Reference Range Interpretation Comments

 

             Neutrophils (%) (Auto) (test code = 44847-4) 72.9         38.7-80.0

                  





[facility] NorthwestLymphocytes (%) (Auto)2020 07:42:00
  * 



             Test Item    Value        Reference Range Interpretation Comments

 

             Lymphocytes (%) (Auto) (test code = 736-9) 10.8         18.0-39.1  

  L             





[facility] NorthwestMonocytes (%) (Auto)2020 07:42:00* 



             Test Item    Value        Reference Range Interpretation Comments

 

             Monocytes (%) (Auto) (test code = 5905-5) 10.8         4.4-11.3    

               





[facility] NorthwestEosinophils (%) (Auto)2020 07:42:00
  * 



             Test Item    Value        Reference Range Interpretation Comments

 

             Eosinophils (%) (Auto) (test code = 713-8) 4.3          0.0-6.0    

                





[facility] NorthwestBasophils (%) (Auto)2020 07:42:00* 



             Test Item    Value        Reference Range Interpretation Comments

 

             Basophils (%) (Auto) (test code = 706-2) 0.9          0.0-1.0      

              





[facility] NorthwestIM GRANULOCYTES %2020 07:42:00* 



             Test Item    Value        Reference Range Interpretation Comments

 

             IM GRANULOCYTES % (test code = IM GRANULOCYTES %) 0.3          0.0-

1.0                    





[facility] NorthwestNeutrophils # (Auto)2020 07:42:00* 



             Test Item    Value        Reference Range Interpretation Comments

 

             Neutrophils # (Auto) (test code = 751-8) 6.7          2.1-6.9      

              





[facility] NorthwestLymphocytes # (Auto)2020 07:42:00* 



             Test Item    Value        Reference Range Interpretation Comments

 

             Lymphocytes # (Auto) (test code = 42456-4) 1.0          1.0-3.2    

                





[facility] NorthwestMonocytes # (Auto)2020 07:42:00* 



             Test Item    Value        Reference Range Interpretation Comments

 

             Monocytes # (Auto) (test code = 742-7) 1.0          0.2-0.8      H 

            





[facility] NorthwestEosinophils # (Auto)2020 07:42:00* 



             Test Item    Value        Reference Range Interpretation Comments

 

             Eosinophils # (Auto) (test code = 711-2) 0.4          0.0-0.4      

              





[facility] NorthwestBasophils # (Auto)2020 07:42:00* 



             Test Item    Value        Reference Range Interpretation Comments

 

             Basophils # (Auto) (test code = 704-7) 0.1          0.0-0.1        

            





[facility] NorthwestAbsolute Immature Granulocyte (auto
2020 07:42:00* 



             Test Item    Value        Reference Range Interpretation Comments

 

                                        Absolute Immature Granulocyte (auto (norma

t code = Absolute Immature Granulocyte 

(auto)          0.03            0-0.1                            





[facility]- PET/CT TUMOR SK BS [id number] 
09:10:00 FAX: Jeffery Izquierdo [phone]    [place]: [facility]: [facility] 
FAX: Marie Gonzales  [phone]   
------------------------------------------------------------------------------- 
 Name:   SALONIANI WHITE [facility]                     
: 1945  Age/S: 74/M           [address]                Unit #: 
[id number]      Loc: [place]  [address]              Phys: 
Marie Liz MD                                             Acct: 
[id number] Dis Date:               [facility]                            
    PHONE #: [phone]     Exam Date:     2020     0950                
   FAX #: [phone]     Reason: LUNG CANCER                                  
      EXAMS:                                               CPT CODE:      
[id number] PET/CT TUMOR SK [facility]                   [address]                    
PROCEDURE: PET CT               INDICATION: History of breast cancer, lung 
cancer               COMPARISON: PET/CT May 23, 2014, CT of the abdomen and 
pelvis 2020, and CT of the chest 2020             
  Following the IV administration of mCi FDG, tomographic imaging was       
performed of the neck, chest, abdomen and pelvis. Blood glucose:       mg/dl. 
Images were reviewed at the workstation. CT imaging was       performed at 3 mm 
increments for comparison and attenuation correction       purposes. These imag
es do not constitute a diagnostic quality CT       examination and were not used
 to diagnose disease independently of the       PET images.                FINDI
NGS:                There is a mass in the left upper lobe that abuts the chest 
wall as       well as the aortic arch that measures up to 4.9 cm on this       e
xamination. This mass was not present on the prior PET/CT       demonstrates a m
ax SUV of 13.4. There is central decreased uptake of       FDG suggesting centra
l necrosis. This may represent recurrence of the       previous malignancy or ma
y represent a primary malignancy.               Adjacent to this mass there is a
 small spiculated lesion (3/71) which       appears to correspond to the spicula
jose lesion seen on the previous       PET/CT. No appreciable FDG uptake of this 
spiculated lesion although       this may partly be attributed to the small size
 of the lesion.               There is concentric mural thickening of the upper 
esophagus at the       level of the T2-T4 vertebral bodies. This was not present
 on the prior       CT scan either. This area is also FDG avid demonstrating nec
k SUV of       21.1. This is highly suspicious for neoplastic process and may   
    represent a primary malignancy.               There is a focus of mildly inc
reased FDG uptake in the left hilum at       the site of the previous parahilar 
mass which demonstrates max SUV of       2.5.               There are also a few
 subcentimeter lymph nodes with mild FDG uptake       with a max SUV of 2.5 that
 are located in the aortopulmonary window.               No abnormal FDG uptake 
in the abdomen or pelvis.             PAGE  1                       Signed Repor
t                    (CONTINUED)  FAX: Jeffery Izquierdo [phone]
    [place]:    St: [facility] FAX: Marie Gonzales  [phone]   -------
------------------------------------------------------------------------  Name: 
  ANI GUALLPA         [facility]                     :   Age/S: 74/M           [address]                Unit #: [id number] 
     Loc: [place]  [address]              Phys: Marie Liz MD                                             Acct: [id number] Dis Date: 
              Status: DEP CLI                                PHONE #: [phone]
     Exam Date:     2020     0950                   FAX #: [phone] 
    Reason: LUNG CANCER                                        EXAMS:           
                                    CPT CODE:      [id number] PET/CT TUMOR SK BS 
MIDTH                   [address]               <Continued>        Agree with the 
above findings.          ** Electronically Signed by TED Schwartz on 
2020 at 0910 **                      Reported and signed by: Berhane Schwartz M.D.                                     CC: Jeffery Serrato MD; 
Marie Liz MD                                                         
                                  Technologist: Payal Rodas RT(N)             
                       Trnscrd Date/Time/By: 2020 (910) : By: [email or web address]  
         Orig Print D/T: S: 2020 (921)                         PAGE  2   
                    Signed Report                               TROPONIN-I
2020 02:15:00* 



             Test Item    Value        Reference Range Interpretation Comments

 

             TROPONIN-I (test code = TROPI) <0.015 ng/mL 0-0.045      N         

    





NEXT ONE AT 2359COMMENTS TO PHLEBOTOMIST: COLLECT 3 HOURS AFTER PREVIOUS        
                   [id number]--[id number] 22:10:00* 



             Test Item    Value        Reference Range Interpretation Comments

 

             TROPONIN-I (test code = TROPI) <0.015 ng/mL 0-0.045      N         

    





COMMENTS TO PHLEBOTOMIST: COLLECT 3 HOURS AFTER PREVIOUS                        
   SAMPLE- CT ABD PELVIS W/[id number] 18:40:00  Name: ANI GUALLPA
 [facility]                     : 1945 Age/S: 74  / M   
      [address]                Unit #: [id number]     Loc:               
[place]  [address]              Phys: Anatoliy Jay MD                        
                            Acct: [id number]  Dis Date:               Status: 
ADM IN                                  PHONE #: [phone]     Exam Date: 
2020  181                     FAX #: [phone]      Reason: diffuse 
abd pain h/o cancer                         EXAMS:                              
                 CPT CODE:      [id number] CT ABD PELVIS W/CONT                  
     [address]                            REASON FOR EXAM: diffuse abd pain h/o 
cancer                EXAM ORDER DATE: 2020 12:33 PM               
Ordering: Anatoliy Jay MD       Attending:Marie Liz MD       
Location:[facility]               PROCEDURE:  - CT ABD PELVIS W/CONT               
COMPARISON:               FINDINGS: CT images of the abdomen and pelvis were o
btained with IV       and without oral contrast at 5mm. Dose modulation, iterati
ve       reconstruction, and/or weight based adjustment of the MA/KV was       u
tilized to reduce the radiation dose to as low as reasonably       achievable.  
              Intravenous contrast: 100cc of Omnipaque 370.               The li
kamilah,  spleen,  pancreas  are grossly within normal limits.        The patient is
 status post cholecystectomy               The kidneys are within normal limits.
  The urinary bladder is       unremarkable.               The colon, small sandra
l, and stomach are within normal limits without       evidence of obstruction.  
The appendix was not seen               No evidence of free air or free fluid.  
Degenerative changes and disc       disease of the lumbar spine without evidence
 of osseous metastasis.                 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
rosemary aorta with         atherosclerotic disease. No acute findings in abdomen. 
No evidence of         metastatic disease to liver.          ** Electronically S
igned by TED Barajas on 2020 at 1840 **                      Reported and
 signed by: Billy Barajas M.D.          PAGE  1                       Signed Report  
                  (CONTINUED)   Name: ANI GUALLPA JR         [facility]                     : 1945 Age/S: 74  / M         [address]                Unit #: [id number]     Loc:               [place]  [address]
              Phys: Anatoliy Jay MD                                            
        Acct: [id number]  Dis Date:               Status: ADM IN              
                    PHONE #: [phone]     Exam Date: 2020       
              FAX #: [phone]      Reason: diffuse abd pain h/o cancer      
                   EXAMS:                                               CPT CODE
:      [id number] CT ABD PELVIS W/CONT                       [address]               
<Continued>                                       CC: Anatoliy Jay MD; 
Marie Liz MD                                                         
                                      Technologist:Jessika BRITO(R); SHREYA MILAN  CTDI:        DLP:        Trnscb Date/Time: 2020 () tSUZYL       
                 Orig Print D/T: S: 2020 ()      PAGE  2              
         Signed Report                               - CTA [id number] 
18:37:00  Name: ANI GUALLPA JR         [facility]                
     : 1945 Age/S: 74  / M         [address] [facility]                Unit
 #: [id number]     Loc:               [place]  [address]              Phys: 
Anatoliy Jay MD                                                    Acct: 
[id number]  Dis Date:               Status: ADM IN                            
      PHONE #: [phone]     Exam Date: 2020  181                     
FAX #: [phone]      Reason: chest pain, h/o cancner                        
     EXAMS:                                               CPT CODE:      
[id number] CTA CHEST                                  [address]                    
REASON FOR EXAM: chest pain, h/o cancner               EXAM ORDER DATE: 
2020 12:33 PM               Ordering: Anatoliy Jay MD       
Attending:Marie Liz MD       Location:[facility]                       
PROCEDURE:  - CTA CHEST                FINDINGS: CT images of the chest were 
obtained with IV contrast using       PE protocol. Reconstructed sagittal and 
coronal images including 3D       reconstructions of the chest were provided for
 interpretation.  Dose       reduction techniques were applied.               
Intravenous contrast: 100cc of Omnipaque 370.               The heart size is 
within normal limits.  No evidence of pericardial       effusion       The 
thoracic aorta is unremarkable aside from atherosclerotic disease.        No 
evidence of dissection or aneurysmal dilatation. Right axillary       lymph node
 dissection clips present.       No filling defect seen within the main or lobar
 pulmonary arteries to       suggest pulmonary embolus       No evidence of 
mediastinal or hilar adenopathy       No evidence of pleural effusion           
      IMPRESSION: Hyperinflated lungs with spiculated malignant appearing       
  mass (4 cm) in the left upper lobe abutting the aortic arch and the         
left anterior chest wall.          ** Electronically Signed by TED Barajas on 
2020 at 1837 **                      Reported and signed by: Billy Barajas M.D.
        CC: Anatoliy Jay MD; Marie Liz MD                           
                                                                    Technolo
gist:Jessika Bass RT(R); SHREYA W  CTDI:        DLP:        Trnscb Date/Time: 
2020 () tSUZYL                        Orig Print D/T: S: 2020
 (184)      PAGE  1                       Signed Report                        
       AG [id number] 17:16:00* 



             Test Item    Value        Reference Range Interpretation Comments

 

             AG CARCINOEMBRYONIC (test code = CEA) 3.6 ng/mL    0.0-3.0      H  

          "HEALTHY" SMOKERS CAN 

HAVE CEA VALUES UP TO 5 NG/ML.  BENIGNDISORDERS SELDOM ELEVATE THE SERUM CEA 
LEVEL ABOVE 10 NG/ML.





BASIC METABOLIC [id number] 15:04:00* 



             Test Item    Value        Reference Range Interpretation Comments

 

             SODIUM (test code = NA) 139 mmol/L   136-145      N             

 

             POTASSIUM (test code = K) 3.6 mmol/L   3.5-5.1      N             

 

             CHLORIDE (test code = CL) 104.0 mmol/L        N             

 

             CARBON DIOXIDE (test code = CO2) 29.0 mmol/L  21-32        N       

      

 

             ANION GAP (test code = GAP) 9.6          10-20        L            

 

 

             GLUCOSE (test code = GLU) 129 mg/dL           H             

 

             BLOOD UREA NITROGEN (test code = BUN) 11 mg/dL     7-18         N  

           

 

             GLOMERULAR FILTRATION RATE (test code = GFR) > 60 mL/min  >=60     

                 Estimated GFR by

 using Modified MDRD formula.Chronic kidney disease is defined as either kidney 
damageor GFR <60 mL/min/1.73 m2 for >3 months.

 

             CREATININE (test code = CREAT) 1.10 mg/dL   0.7-1.3      N         

    

 

             BUN/CREATININE RATIO (test code = BUN/CREA) 10.0         10-20     

   N             

 

             CALCIUM (test code = CA) 9.1 mg/dL    8.5-10.1     N             





HEPATIC FUNCTION [id number] 15:04:00* 



             Test Item    Value        Reference Range Interpretation Comments

 

             TOTAL PROTEIN (test code = PROT) 7.5 gram/dL  6.4-8.2      N       

      

 

             ALBUMIN (test code = ALB) 3.2 g/dL     3.4-5.0      L             

 

             GLOBULIN (test code = GLOB) 4.3 gram/dL  2.7-4.2      H            

 

 

             ALBUMIN/GLOBULIN RATIO (test code = A/G) 0.7          0.75-1.50    

L             

 

             BILIRUBIN TOTAL (test code = BILT) 0.50 mg/dL   0.0-1.0      N     

        

 

             BILIRUBIN DIRECT (test code = BILD) 0.16 mg/dL   0.0-0.20     N    

         

 

             SGOT/AST (test code = AST) 22 IUnit/L   15-37        N             

 

             SGPT/ALT (test code = ALT) 28 IUnit/L   12-78        N             

 

             ALKALINE PHOSPHATASE TOTAL (test code = ALKP) 71 IUnit/L     

     N            **Note change 

in reference range due to change in reagent.**





[id number] 15:04:00* 



             Test Item    Value        Reference Range Interpretation Comments

 

             LIPASE (test code = LIP) 82 U/L       73.0-393.0   N             





[id number]--[id number] 15:04:00* 



             Test Item    Value        Reference Range Interpretation Comments

 

             TROPONIN-I (test code = TROPI) <0.015 ng/mL 0-0.045      N         

    





PROTHROMBIN [id number] 14:54:00* 



             Test Item    Value        Reference Range Interpretation Comments

 

             PROTHROMBIN TIME PATIENT (test code = PTP) 13.1 seconds 9.0-14.0   

  N             

 

             INTERNATIONAL NORMAL RATIO (test code = INR) 1.1          0.8-1.2  

    N            The therapeutic range

 for oral anticoagulant therapy formost indications is an international 
normalized ratio (INR)of between 2.0 and 3.0.  The recommended therapeutic 
INRrange for various clinical situations is listed 
below:_________________________________________________________Clinical 
Situation                          INR 
range_________________________________________________________ Pulmonary e
mbolism treatment              (2.0-3.0)Venous thrombosis treatmentVenous 
thrombosis prophylaxis (high risk surgery)Prevention of systemic embolism from: 
        Acute myocardial infarction         Valvular heart disease         
Atrial fibrillation Mechanical prosthetic heart valves          (2.5-3.5)





IS PATIENT ON ANTICOAGULANTS? NTHROMBOPLASTIN TIME [id number] 14:54:00* 



             Test Item    Value        Reference Range Interpretation Comments

 

             THROMBOPLASTIN TIME PARTIAL (test code = PTT) 31.0 seconds 25.0-36.

5    N             





IS PATIENT ON ANTICOAGULANTS? NURINALYSIS [id number] 14:54:00* 



             Test Item    Value        Reference Range Interpretation Comments

 

             UA COLOR (test code = COLU) Dark-Yellow  YELLOW                    

 

 

             UA APPEARANCE (test code = APPU) CLEAR        CLEAR                

      

 

             UA GLUCOSE DIPSTICK (test code = DGLUU) NEGATIVE mg/dL NEGATIVE    

               

 

             UA BILIRUBIN DIPSTICK (test code = BILU) 0.5 (1+) mg/dL NEGATIVE   

  A             

 

             UA KETONE DIPSTICK (test code = KETU) 10 (1+) mg/dL NEGATIVE     A 

            

 

             UA SPECIFIC GRAVITY (test code = SGU) 1.039        1.001-1.035     

           

 

             UA BLOOD DIPSTICK (test code = LESA) Negative mg/dL NEGATIVE        

           

 

             UA PH DIPSTICK (test code = NELLY) 6.0          5.0-8.0              

      

 

             UA PROTEIN DIPSTICK (test code = PROU) 50 (1+) mg/dL NEGATIVE     A

             

 

             UA UROBILINIOGEN DIPSTICK (test code = URO) 4.0 (2+) mg/dL NEGATIVE

     A             

 

             UA NITRITE DIPSTICK (test code = BREN) NEGATIVE     NEGATIVE       

            

 

                    UA LEUKOCYTE ESTERASE W REFLEX (test code = LEUUR) 25 Mohini/uL

 (Trace) Mohini/uL 

NEGATIVE                  A                          

 

             UA WBC (test code = WBCU) 0-5 per HPF  0-5                        

 

             UA RBC (test code = RBCU) 3-5 #/HPF    0-5                        

 

             UA EPITHELIAL CELLS (test code = EPIU) FEW per HPF  FEW            

            

 

             UA BACTERIA (test code = BACU) FEW #/HPF    NONE         A         

    

 

             UA HYALINE CAST (test code = HYALU) 11-20 #/LPF  0-5          A    

         

 

             UA MUCUS (test code = MUCU) MANY #/LPF   FEW          A            

 





Urine Source? Clean CatchURINALYSIS [id number] 14:53:00* 



             Test Item    Value        Reference Range Interpretation Comments

 

             UA COLOR (test code = COLU) Dark-Yellow  YELLOW                    

 

 

             UA APPEARANCE (test code = APPU) CLEAR        CLEAR                

      

 

             UA GLUCOSE DIPSTICK (test code = DGLUU) NEGATIVE mg/dL NEGATIVE    

               

 

             UA BILIRUBIN DIPSTICK (test code = BILU) 0.5 (1+) mg/dL NEGATIVE   

  A             

 

             UA KETONE DIPSTICK (test code = KETU) 10 (1+) mg/dL NEGATIVE     A 

            

 

             UA SPECIFIC GRAVITY (test code = SGU) 1.039        1.001-1.035     

           

 

             UA BLOOD DIPSTICK (test code = LESA) Negative mg/dL NEGATIVE        

           

 

             UA PH DIPSTICK (test code = NELLY) 6.0          5.0-8.0              

      

 

             UA PROTEIN DIPSTICK (test code = PROU) 50 (1+) mg/dL NEGATIVE     A

             

 

             UA UROBILINIOGEN DIPSTICK (test code = URO) 4.0 (2+) mg/dL NEGATIVE

     A             

 

             UA NITRITE DIPSTICK (test code = BREN) NEGATIVE     NEGATIVE       

            

 

                    UA LEUKOCYTE ESTERASE W REFLEX (test code = LEUUR) 25 Mohini/uL

 (Trace) Mohini/uL 

NEGATIVE                  A                          

 

             UA WBC (test code = WBCU)  per HPF     0-5                        

 

             UA RBC (test code = RBCU)  per HPF     0-5                        

 

             UA EPITHELIAL CELLS (test code = EPIU)  per HPF     Few            

            

 

             UA BACTERIA (test code = BACU)  per HPF     NONE                   

    





Urine Source? Clean CatchCBC W/O [id number] 14:45:00* 



             Test Item    Value        Reference Range Interpretation Comments

 

             WHITE BLOOD CELL (test code = WBC) 8.0 K/mm3    4.5-12.5     N     

        

 

             RED BLOOD CELL (test code = RBC) 3.84 mill/mm3 4.0-5.8      L      

       

 

             HEMOGLOBIN (test code = HGB) 11.8 gram/dL 13.0-17.5    L           

  

 

             HEMATOCRIT (test code = HCT) 36.9 %       42.0-52.0    L           

  

 

             MEAN CELL VOLUME (test code = MCV) 96.1 fL      80-98        N     

        

 

             MEAN CELL HGB (test code = MCH) 30.7 picogram 27.0-33.0    N       

      

 

             MEAN CELL HGB CONCETRATION (test code = MCHC) 32.0 gram/dL 33.0-36.

0    L             

 

             RED CELL DISTRIBUTION WIDTH (test code = RDW) 12.9 %       11.6-16.

2    N             

 

             PLATELET COUNT (test code = PLT) 289 K/mm3    150-450      N       

      

 

             MEAN PLATELET VOLUME (test code = MPV) 10.1 fL      6.7-11.0     N 

            





BASIC METABOLIC [id number] 14:44:00* 



             Test Item    Value        Reference Range Interpretation Comments

 

             SODIUM (test code = NA) 139 mmol/L   136-145      N             

 

             POTASSIUM (test code = K) 3.6 mmol/L   3.5-5.1      N             

 

             CHLORIDE (test code = CL) 104.0 mmol/L        N             

 

             CARBON DIOXIDE (test code = CO2)  mmol/L      21-32                

      

 

             ANION GAP (test code = GAP)              10-20                     

 

 

             GLUCOSE (test code = GLU)  mg/dL                            

 

             BLOOD UREA NITROGEN (test code = BUN)  mg/dL       7-18            

           

 

             GLOMERULAR FILTRATION RATE (test code = GFR)  mL/min      >=60     

                  

 

             CREATININE (test code = CREAT)  mg/dL       0.7-1.3                

    

 

             BUN/CREATININE RATIO (test code = BUN/CREA)              10-20     

                 

 

             CALCIUM (test code = CA)  mg/dL       8.5-10.1                   





HEPATIC FUNCTION [id number] 14:44:00* 



             Test Item    Value        Reference Range Interpretation Comments

 

             TOTAL PROTEIN (test code = PROT)  gram/dL     6.4-8.2              

      

 

             ALBUMIN (test code = ALB)  g/dL        3.4-5.0                    

 

             GLOBULIN (test code = GLOB)  gram/dL     2.7-4.2                   

 

 

             ALBUMIN/GLOBULIN RATIO (test code = A/G)              0.75-1.50    

              

 

             BILIRUBIN TOTAL (test code = BILT)  mg/dL       0.0-1.0            

        

 

             BILIRUBIN DIRECT (test code = BILD)  mg/dL       0.0-0.20          

         

 

             SGOT/AST (test code = AST)  IUnit/L     15-37                      

 

             SGPT/ALT (test code = ALT)  IUnit/L     12-78                      

 

             ALKALINE PHOSPHATASE TOTAL (test code = ALKP)  IUnit/L       

                   





[id number] 14:44:00* 



             Test Item    Value        Reference Range Interpretation Comments

 

             LIPASE (test code = LIP)  U/L         73.0-393.0                 





[id number]--[id number] 14:44:00* 



             Test Item    Value        Reference Range Interpretation Comments

 

             TROPONIN-I (test code = TROPI)  ng/mL       0-0.045                

    





- XR CHEST 1 -94-76 13:23:00 FAX:         Anatoliy Jay MD     [phone]
    [place]:    St: [facility] FAX: Marie Gonzales  [phone]   
------------------------------------------------------------------------------- 
 Name:   MEGANCODEYSUSANANI [facility]                     
: 1945  Age/S: 74/M           [address] [facility]                Unit #: 
[id number]      Loc: [place]  [address]              Phys: 
Anatoliy Jay MD                                                    Acct: 
[id number] Dis Date:               Status: REG ER                             
    PHONE #: [phone]     Exam Date:     2020     1240                
   FAX #: [phone]     Reason: cougyh                                       
      EXAMS:                                               CPT CODE:      
[id number] XR CHEST 1 V                               [address]                      
      REASON FOR EXAM: cougyh               Exam Order Date: 2020 12:23 PM 
              Ordering M.D.: Anatoliy Jay MD               PROCEDURE:  - XR 
CHEST 1 V               COMPARISON: 2 view chest x-ray 2019            
   FINDINGS:         The lungs are clear.  There is no pleural effusion or 
pneumothorax.       Pulmonary vascularity is within normal limits.              
 Cardiomediastinal silhouette is normal in size for technique. The       
mediastinal contours are within normal limits. Atherosclerotic disease       is 
present in the aortic arch.               Changes of prior right-sided mastec
pamela and right-sided axillary lymph       node dissection are redemonstrated. Th
ere are degenerative changes of       the spine.               The visualized up
per abdomen is within normal limits.                         IMPRESSION:        
 No acute cardiopulmonary process.                   Location: [facility]          ** E
lectronically Signed by Patrice Mercer MD on 2020 at 1323 **                 
     Reported and signed by: Patrice Mercer MD         CC: Anatoliy Jay MD; Marie Patel MD                                                               
                                Technologist: SILVINO GRUBBS JR                   
                     Trnscrd Date/Time/By: 2020 (8407) : By: [email or web address]   
       Orig Print D/T: S: 2020 (1939)                         PAGE  1     
                  Signed Report                               - XR CHEST 2 V
2019 14:18:00 FAX: Marie Gonzales  [phone]    [place]: O 
  St: REG----------
---------------------------------------------------------------------  Name:   ANI RIVERA [facility]                     : [birth year]  Age/S: 73/M           [address] [facility]                Unit #: [id number]    
  [place]: [place]  [address]              Phys: Marie Liz MD                                             Acct: [id number] Dis Date:    
           Status: REG CLI                                PHONE #: [phone] 
    Exam Date:     2019     1410                   FAX #: [phone]    
 Reason: N34.12                                             EXAMS:              
                                 CPT CODE:      [id number] XR CHEST 2 V          
                     [address]                            REASON FOR EXAM: N34.12   
            Exam Order Date: 2019 1:57 PM               Ordering M.D.: Oxana Liz MD               PROCEDURE:  - XR CHEST 2 V               ROSANNE
RISON: CT of the chest 2016               FINDINGS:         The sp
iculated lesion in the left upper lobe seen on the previous CT       scan is not
 clearly visualized on this exam. There are a few scattered       areas of subse
gmental atelectasis versus scarring in the left upper       lung.       Surgical
 clips project over the right axilla. Additionally there is       increased luce
ncy of the right hemithorax compared to the left, likely       secondary to the 
decreased amount of overlying soft tissues, better       appreciated on the prev
ious CT scan.               No acute airspace disease.               Cardiac med
iastinal silhouette is normal in size.               There is dextroscoliosis ce
ntered at the lower thoracic spine.       Visualized upper abdomen is within nor
mal limits.                         IMPRESSION: No acute cardiopulmonary process
.         Increased lucency of the right hemithorax compared to the left is     
    likely due to the decreased amount of overlying soft tissue in the         a
nterior right chest wall.          ** Electronically Signed by Patrice Mercer MD on
 2019 at 1418 **                      Reported and signed by: Patrice Mercer MD       CC: Marie Liz MD                                            
                                                                     Technologis
t: BAYLEE HUMPHREY RT (R)                          Trnscrd Date/Time/By: 
2019 (2768) : By: [email or web address]          Orig Print D/T: S: 2019 (3526)
                         PAGE  1                       Signed Report            
                   Sodium [id number] 08:50:00* 



             Test Item    Value        Reference Range Interpretation Comments

 

             Sodium Level (test code = 2951-2) 138          136-145             

       





[facility] NorthwestPotassium [id number] 08:50:00* 



             Test Item    Value        Reference Range Interpretation Comments

 

             Potassium Level (test code = 2823-3) 4.2          3.5-5.1          

          





[facility] NorthwestChloride [id number] 08:50:00* 



             Test Item    Value        Reference Range Interpretation Comments

 

             Chloride Level (test code = 2075-0) 106                      

         





[facility] NorthwestCarbon Dioxide [id number] 08:50:00* 



             Test Item    Value        Reference Range Interpretation Comments

 

             Carbon Dioxide Level (test code = 2028-9) 23           22-29       

               





[facility] NorthwestAnion [id number] 08:50:00* 



             Test Item    Value        Reference Range Interpretation Comments

 

             Anion Gap (test code = 33037-3) 13.2         8-16                  

     





[facility] NorthwestBlood Urea [id number] 08:50:00* 



             Test Item    Value        Reference Range Interpretation Comments

 

             Blood Urea Nitrogen (test code = 3094-0) 15           7-26         

              





[facility] NorthwestCreatinine2018-12-23 08:50:00* 



             Test Item    Value        Reference Range Interpretation Comments

 

             Creatinine (test code = 2160-0) 0.82         0.72-1.25             

     





[facility] NorthwestBUN/Creatinine Fxrbd2042-00-20 08:50:00* 



             Test Item    Value        Reference Range Interpretation Comments

 

             BUN/Creatinine Ratio (test code = 3097-3) 18           6-        

               





[facility] NorthwestEstimat Glomerular Filtration Rate
2018 08:50:00* 



             Test Item    Value        Reference Range Interpretation Comments

 

             Estimat Glomerular Filtration Rate (test code = 461710621) > 60    

     >60                        





Ranges were taken from the National Kidney Disease Education Program and the [facility] literature.Reference ranges:60 or greater: Xqubrk56-67 (
for 3 consecutive months): Chronic kidney disease 15 or less: Kidney failureCorpus Christi Medical Center NorthwestGlucose Ubcsc7234-39-36 08:50:00* 



             Test Item    Value        Reference Range Interpretation Comments

 

             Glucose Level (test code = EQC6122) 114                      

         





[facility] NorthwestCalcium Sjaka8224-63-47 08:50:00* 



             Test Item    Value        Reference Range Interpretation Comments

 

             Calcium Level (test code = 98171-8) 8.8          8.4-10.2          

         





[facility] NorthwestTotal Mhqkpyoza4095-75-26 08:50:00* 



             Test Item    Value        Reference Range Interpretation Comments

 

             Total Bilirubin (test code = 1975-2) 1.4          0.2-1.2      H   

          





[facility] NorthwestAspartate Amino Transf (AST/SGOT)
2018 08:50:00* 



             Test Item    Value        Reference Range Interpretation Comments

 

                                        Aspartate Amino Transf (AST/SGOT) (test 

code = Aspartate Amino Transf 

(AST/SGOT))     65              5-34            H                





[facility] NorthwestAlanine Aminotransferase (ALT/SGPT)
2018 08:50:00* 



             Test Item    Value        Reference Range Interpretation Comments

 

             Alanine Aminotransferase (ALT/SGPT) (test code = 1742-6) 94        

   0-55         H             





[facility] NorthwestTotal Bwfkzld0448-27-14 08:50:00* 



             Test Item    Value        Reference Range Interpretation Comments

 

             Total Protein (test code = 2885-2) 6.4          6.5-8.1      L     

        





[facility] NorthwestAlbumin2018-12-23 08:50:00* 



             Test Item    Value        Reference Range Interpretation Comments

 

             Albumin (test code = 1751-7) 3.1          3.5-5.0      L           

  





[facility] NorthwestGlobulin2018-12-23 08:50:00* 



             Test Item    Value        Reference Range Interpretation Comments

 

             Globulin (test code = 54887-9) 3.3          2.3-3.5                

    





[facility] NorthwestAlbumin/Globulin Pbjcm4217-73-77 08:50:00
  * 



             Test Item    Value        Reference Range Interpretation Comments

 

             Albumin/Globulin Ratio (test code = 1759-0) 0.9          0.8-2.0   

                 





[facility] NorthwestAlkaline Pwnctmeztlu9775-01-43 08:50:00* 



             Test Item    Value        Reference Range Interpretation Comments

 

             Alkaline Phosphatase (test code = 6768-6) 53                 

               





[facility] NorthwestWhite Blood Wzblw4427-06-91 07:01:00* 



             Test Item    Value        Reference Range Interpretation Comments

 

             White Blood Count (test code = 6690-2) 16.03        4.8-10.8     H 

            





REVIEWEDCorpus Christi Medical Center NorthwestRed Blood Qnxaw8414-96-41 
07:01:00* 



             Test Item    Value        Reference Range Interpretation Comments

 

             Red Blood Count (test code = 789-8) 3.82         4.3-5.7      L    

         





[facility] NorthwestHemoglobin2018-12-23 07:01:00* 



             Test Item    Value        Reference Range Interpretation Comments

 

             Hemoglobin (test code = 37057-6) 12.1         14.0-18.0    L       

      





[facility] NorthwestHematocrit2018-12-23 07:01:00* 



             Test Item    Value        Reference Range Interpretation Comments

 

             Hematocrit (test code = 4544-3) 38.9         38.2-49.6             

     





[facility] NorthwestMean Corpuscular Jprfhm5842-99-37 
07:01:00* 



             Test Item    Value        Reference Range Interpretation Comments

 

             Mean Corpuscular Volume (test code = 787-2) 101.8        81-99     

   H             





[facility] NorthwestMean Corpuscular Hwtjafnwcl3003-41-40 
07:01:00* 



             Test Item    Value        Reference Range Interpretation Comments

 

             Mean Corpuscular Hemoglobin (test code = 785-6) 31.7         28-32 

                     





[facility] NorthwestMean Corpuscular Hemoglobin Concent
2018 07:01:00* 



             Test Item    Value        Reference Range Interpretation Comments

 

             Mean Corpuscular Hemoglobin Concent (test code = 786-4) 31.1       

  31-35                      





[facility] NorthwestRed Cell Distribution Mfcuw0646-45-67 
07:01:00* 



             Test Item    Value        Reference Range Interpretation Comments

 

             Red Cell Distribution Width (test code = 93338-9) 13.2         11.7

-14.4                  





[facility] NorthwestPlatelet Nrnlr4322-42-50 07:01:00* 



             Test Item    Value        Reference Range Interpretation Comments

 

             Platelet Count (test code = 777-3) 180          140-360            

        





[facility] NorthwestNeutrophils (%) (Auto)2018 07:01:00
  * 



             Test Item    Value        Reference Range Interpretation Comments

 

             Neutrophils (%) (Auto) (test code = 00315-8) 87.4         38.7-80.0

    H             





[facility] NorthwestLymphocytes (%) (Auto)2018 07:01:00
  * 



             Test Item    Value        Reference Range Interpretation Comments

 

             Lymphocytes (%) (Auto) (test code = 736-9) 4.8          18.0-39.1  

  L             





[facility] NorthwestMonocytes (%) (Auto)2018 07:01:00* 



             Test Item    Value        Reference Range Interpretation Comments

 

             Monocytes (%) (Auto) (test code = 5905-5) 7.2          4.4-11.3    

               





[facility] NorthwestEosinophils (%) (Auto)2018 07:01:00
  * 



             Test Item    Value        Reference Range Interpretation Comments

 

             Eosinophils (%) (Auto) (test code = 713-8) 0.0          0.0-6.0    

                





[facility] NorthwestBasophils (%) (Auto)2018 07:01:00* 



             Test Item    Value        Reference Range Interpretation Comments

 

             Basophils (%) (Auto) (test code = 706-2) 0.1          0.0-1.0      

              





[facility] NorthwestIM GRANULOCYTES %2018 07:01:00* 



             Test Item    Value        Reference Range Interpretation Comments

 

             IM GRANULOCYTES % (test code = IM GRANULOCYTES %) 0.5          0.0-

1.0                    





[facility] NorthwestNeutrophils # (Auto)2018 07:01:00* 



             Test Item    Value        Reference Range Interpretation Comments

 

             Neutrophils # (Auto) (test code = 751-8) 14.0         2.1-6.9      

H             





[facility] NorthwestLymphocytes # (Auto)2018 07:01:00* 



             Test Item    Value        Reference Range Interpretation Comments

 

             Lymphocytes # (Auto) (test code = 72994-0) 0.8          1.0-3.2    

  L             





[facility] NorthwestMonocytes # (Auto)2018 07:01:00* 



             Test Item    Value        Reference Range Interpretation Comments

 

             Monocytes # (Auto) (test code = 742-7) 1.2          0.2-0.8      H 

            





[facility] [facility] NorthwestEosinophils # (Auto)2018 07:01:00* 



             Test Item    Value        Reference Range Interpretation Comments

 

             Eosinophils # (Auto) (test code = 711-2) 0.0          0.0-0.4      

              





[facility] 07:01:00* 



             Test Item    Value        Reference Range Interpretation Comments

 

             Basophils # (Auto) (test code = 704-7) 0.0          0.0-0.1        

            





[facility] NorthwestAbsolute Immature Granulocyte (auto
2018 07:01:00* 



             Test Item    Value        Reference Range Interpretation Comments

 

                                        Absolute Immature Granulocyte (auto (norma

t code = Absolute Immature Granulocyte 

(auto)          0.08            0-0.1                            





[facility] NorthwestABDOMEN 2 [id number] 06:31:00        
                                                                              [place] [address]      Patient Name: ANI GUALLPA      
                             MR #: [id number]                     :                                    Age/Sex: 73/M  Acct #: [id number]    
                          Req #: [id number]  Adm Physician: XANDER KUMARI MD    
                                  Ordered by: DEREK CA MD            
                Report #: [id number]        Location: MED/SURG                   
             Room/Bed: [facility]               _____________________________________
______________________________________________________________    Procedure: 122
0-0001 DX/ABDOMEN 2 VIEW  Exam Date: 18                            Exam Ti
me: 0615                                              REPORT STATUS: Signed    A
BDOMEN 2 VIEW      Clinical history: Small bowel obstruction   Technique: AP vie
w abdomen, supine and upright   Comparison: Recent CT, previous day x-ray      F
indings:   Stable NG tube position within the expected stomach. No dilated gas-f
illed   loops of small bowel. Moderate colonic stool burden. No free air. Vascul
ar   calcifications and scoliotic curvature of the spine noted.      Impression:
   No radiographic findings of small bowel obstruction.       Signed by: Dr Aaron Frausto MD on 2018 6:32 AM        Dictated By: ROBERTH FRAUSTO MD  Elec
tronically Signed By: ROBERTH FRAUSTO MD on 18  Transcribed By: MELISSA JACQUES on 18       COPY TO:   DEREK CA MD        HEPTOBILIARY W
 [id number] 15:23:00                                                       
                               [place] [address]      Patient 
Name: ANI GUALLPA                                   MR #: [id number]    
                 : 1945                                   Age/Sex: 73/M
  Acct #: [id number]                              Req #: [id number]  Adm 
Physician: XANDER KUMARI MD                                      Ordered by: 
MARIE LIZ MD                            Report #: [id number]        
Location: MED/SURG                                Room/Bed: [facility]               
______________________________________
_____________________________________________________________    Procedure: 1219
-0003 NM/HEPTOBILIARY W PHARM  Exam Date:                             Exam Time:
                                               REPORT STATUS: Signed    Hepatobi
liary Scan with Gallbladder Ejection Fraction      Clinical information: 73 M wi
th SBO, likely resolved, and new onset abdominal   pain      Technique: Followin
g intravenous administration of 6.7 millicuries of Tc-99m   mebrofenin, dynamic 
images of the abdomen in the anterior projection were   obtained through 50 vilma
norma.  Sincalide (CCK analog) 1.6 micrograms was   administered intravenously ove
r 30 minutes with additional imaging for   determination of gallbladder ejection
 fraction.      Discussion: Perfusion of the liver is normal.  Extraction of tra
cer by the   liver parenchyma is normal.  Tracer appears promptly within the moon
iary tract.    The gallbladder begins to fill at 18 minutes post injection of tr
acer and fills   adequately.  Tracer is seen in the small bowel by 35 minutes.  
There is no   contractile response by the gallbladder to the pharmacologic dose 
of sincalide.    No emptying of the gallbladder occurs during the 30 minute infu
phuong.        Impression:       1.  Filling of the gallbladder excludes acute cys
tic duct obstruction/acute   cholecystitis.      2.  The gallbladder ejection fr
action is undefined as there is no emptying of   the gallbladder during the infu
phuong of sincalide.  This absence of a   contractile response to sincalide suppor
ts the clinical diagnosis of chronic   cholecystitis/gallbladder dyskinesia.    
  Signed by: Dr. Alessia Dupont M.D. on 2018 3:25 PM        Dictated By: MALGORZATA DUPONT MD  Electronically Signed By: ALESSIA DUPONT MD on 18 152  Transcri
bed By: TRAVIS on 18 1529       COPY TO:   MARIE LZI MD        
Prothrombin [id number] 06:04:00* 



             Test Item    Value        Reference Range Interpretation Comments

 

             Prothrombin Time (test code = 5902-2) 13.5         11.9-14.5       

           





[facility] NorthwestProthromb Time International Ratio
2018 06:04:00* 



             Test Item    Value        Reference Range Interpretation Comments

 

             Prothromb Time International Ratio (test code = 6301-6) 0.95       

                             





Oral Anticoagulant Therapy INR Values:1. Low Intensity Therapy        1.5 - 2.02
. Moderate Intensity Therapy   2.0 - 3.03. High Intensity Therapy(1)    2.5 - 3.
54. High Intensity Therapy(2)    3.0 - 4.05. Panic Value INR              > 5.0
[facility] NorthwestActivated Partial Thromboplast Time
2018 06:04:00* 



             Test Item    Value        Reference Range Interpretation Comments

 

             Activated Partial Thromboplast Time (test code = 33426-0) 27.3     

    23.8-35.5                  





[facility] NorthwestABDOMEN 2 [id number] 05:29:00        
                                                                              [place] [address]      Patient Name: ANI GUALLPA      
                             MR #: [id number]                     :                                    Age/Sex: 73/M  Acct #: [id number]    
                          Req #: [id number]  Adm Physician: XANDER KUMARI MD    
                                  Ordered by: VALERIE MIRANDA MD          
                  Report #: [id number]        Location: MED/SURG                 
               Room/Bed: [facility]               ___________________________________
________________________________________________________________    Procedure: 1
219-0008 DX/ABDOMEN 2 VIEW  Exam Date: 18                            Exam 
Time: 450                                              REPORT STATUS: Signed   
 ABDOMEN 2 VIEW      Clinical history:  FALT AND UPRIGHT PT WITH SBO  20180  Y   Technique: AP view abdomen, supine and upright   Comparison: Recent CT
      Findings:   NG tube position within the expected stomach. No dilated gas-f
illed loops of   small bowel. Moderate colonic stool burden. No free air. Vascul
ar   calcifications and scoliotic curvature of the spine noted.      Impression:
   No radiographic findings of small bowel obstruction.       Signed by: Dr Aaron Frausto MD on 2018 5:31 AM        Dictated By: ROBERTH FRAUSTO MD  Elec
tronically Signed By: ROBERTH FRAUSTO MD on 18  Transcribed By: MELISSA JACQUES on 18       COPY TO:   VALERIE MIRANDA MD        Urine WBC
2018 01:16:00* 



             Test Item    Value        Reference Range Interpretation Comments

 

             Urine WBC (test code = 5821-4) 11-20        0-5          H         

    





[facility] NorthwestUrine [id number] 01:16:00* 



             Test Item    Value        Reference Range Interpretation Comments

 

             Urine RBC (test code = 21631-1) 0-5          0-5                   

     





[facility] NorthwestUrine Gylommiv3368-88-14 01:16:00* 



             Test Item    Value        Reference Range Interpretation Comments

 

             Urine Bacteria (test code = 42157-1) RARE         NONE             

          





[facility] NorthwestUrine Epithelial Winwm8201-24-25 01:16:00
  * 



             Test Item    Value        Reference Range Interpretation Comments

 

             Urine Epithelial Cells (test code = 38606-1) RARE         NONE     

                  





[facility] NorthwestUrine Bmmbw9161-33-16 01:05:00* 



             Test Item    Value        Reference Range Interpretation Comments

 

             Urine Color (test code = 5778-6) YELLOW       YELLOW               

      





[facility] NorthwestUrine Jwcajub2332-94-21 01:05:00* 



             Test Item    Value        Reference Range Interpretation Comments

 

             Urine Clarity (test code = 11932-2) CLEAR        CLEAR             

         





[facility] Specific Cyfoexk1931-23-45 01:05:00
  * 



             Test Item    Value        Reference Range Interpretation Comments

 

             Urine Specific Gravity (test code = 5811-5) 1.010        1.010-1.02

5                





[facility] NorthwestUrine iQ6968-50-03 01:05:00* 



             Test Item    Value        Reference Range Interpretation Comments

 

             Urine pH (test code = 36411-9) 7            5-7                    

    





[facility] Leukocyte Epcpkxgv2869-64-78 
01:05:00* 



             Test Item    Value        Reference Range Interpretation Comments

 

             Urine Leukocyte Esterase (test code = 5799-2) NEGATIVE     NEGATIVE

                   





[facility] NorthwestUrine Mtwmecm0927-59-36 01:05:00* 



             Test Item    Value        Reference Range Interpretation Comments

 

             Urine Nitrite (test code = 70810-3) NEGATIVE     NEGATIVE          

         





[facility] NorthwestUrine Kevhgum1889-51-45 01:05:00* 



             Test Item    Value        Reference Range Interpretation Comments

 

             Urine Protein (test code = 5804-0) NEGATIVE     NEGATIVE           

        





[facility] NorthwestUrine Glucose (UA)2018 01:05:00* 



             Test Item    Value        Reference Range Interpretation Comments

 

             Urine Glucose (UA) (test code = 2349-9) NEGATIVE     NEGATIVE      

             





[facility] NorthwestUrine Jtfbesz0062-12-12 01:05:00* 



             Test Item    Value        Reference Range Interpretation Comments

 

             Urine Ketones (test code = 62548-3) NEGATIVE     NEGATIVE          

         





[facility] NorthwestUrine Jyhghdoodprx8155-64-76 01:05:00* 



             Test Item    Value        Reference Range Interpretation Comments

 

             Urine Urobilinogen (test code = 48999-8) 0.2          0.2-1        

              





[facility] NorthwestUrine Agudumhfi0093-14-58 01:05:00* 



             Test Item    Value        Reference Range Interpretation Comments

 

             Urine Bilirubin (test code = 1978-6) NEGATIVE     NEGATIVE         

          





[facility] NorthwestUrine Yaosh3900-92-16 01:05:00* 



             Test Item    Value        Reference Range Interpretation Comments

 

             Urine Blood (test code = 59593-4) NEGATIVE     NEGATIVE            

       





[facility] NorthwestCT ABDOMEN/PELVIS -33-77 22:34:00   
                                                                                
   [facility]                        [address] [place] [address]      Patient Name: ANI GUALLPA 
                                  MR #: [id number]                     :                                    Age/Sex: 73/M  Acct #: [id number]    
                          Req #: [id number]  Adm Physician:                     
                                 Ordered by: VALERIE MIRANDA MD           
                 Report #: [id number]        Location: ER                        
              Room/Bed:                     ____________________________________
_______________________________________________________________    Procedure:  CT/CT ABDOMEN/PELVIS W  Exam Date: 18
xam Time: 2200                                              REPORT STATUS: Candida
d    EXAM: CT ABDOMEN/PELVIS W   DATE: 2018 9:00 PM     INDICATION:  Abdom
inal pain   COMPARISON:  None    TECHNIQUE: The abdomen and pelvis were scanned 
using a multidetector helical   scanner. Coronal and sagittal reformations were 
obtained.  CT low dose   techniques were utilized, as applicable.   IV Contrast:
  100 ml Isovue 300/370      FINDINGS:   LOWER THORAX: Left basilar atelectasis/
scar. Several small pulmonary nodules,   stable from recent chest CT for example
 5 mm in the right lower lobe      LIVER/BILIARY:  No masses.  No ductal dilatat
ion.      GALLBLADDER: Mildly distended gallbladder containing a 1.9 cm stone.  
 SPLEEN: Unremarkable   PANCREAS: Unremarkable      ADRENALS: No nodules   KIDNE
YS: No suspicious renal masses.  No hydronephrosis.        GI TRACT: Dilated flu
id-filled small bowel loops in the central abdomen and   right lower quadrant wi
th transition point as the bowel extends anterior and   superior to the liver (C
hilaiditi positioning, image 37, 36).      VESSELS: Severe atherosclerotic becerril
e with multiple areas of adherent neural   thrombus/noncalcified plaque. Aneurys
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 
respectively   PERITONEUM/RETROPERITONEUM: No free air. Mild free fluid and mese
nteric edema.   LYMPH NODES: No lymphadenopathy      REPRODUCTIVE ORGANS/BLADDER
: Unremarkable      SOFT TISSUES: Fat-containing ventral hernia with minimal str
anding.   BONES: Multilevel degenerative changes and curvature of the spine.    
  IMPRESSION:   Small bowel obstruction, with transition point adjacent to the l
iver   (Chilaiditi's).      Signed by: Dr Roberth Frausto MD on 2018 10:49 
PM        Dictated By: ROBERTH FRAUSTO MD  Electronically Signed By: ROBERTH RIOS MD on 18  Transcribed By: TRAVIS on 18       COPY TO
:   VALERIE MIRANDA MD        Amylase Ybguf9141-74-35 21:38:00* 



             Test Item    Value        Reference Range Interpretation Comments

 

             Amylase Level (test code = 1798-8) 108                       

        





[facility] NorthwestLipase2018-12-18 21:38:00* 



             Test Item    Value        Reference Range Interpretation Comments

 

             Lipase (test code = 3040-3) 15           8-78                      

 





[facility] NorthwestCT CHEST -48-18 09:20:00    [facility]   [address] [place] [address]      Patient Name: ANI GUALLPA   MR #: [id number]    : 
1945 Age/Sex: 72/M  Acct #: [id number] Req #: [id number]  Adm Physician:
 SHARYN CRUZ MD, ABIM    Ordered by: BECKY RAMIREZ MD  Report #: [id number]   
Location: MED/SURG  Room/Bed: [facility]    _____________________________________
______________________________________________________________    Procedure: 081
2-0002 CT/CT CHEST W  Exam Date: 18                            Exam Time: 
530       REPORT STATUS: Signed    EXAM: CT Chest WITH contrast 2018 6:00 
AM   INDICATION:                  COMPARISON: Chest x-ray on 8/10/2018, chest CT
 dated 2013      TECHNIQUE:   Chest was scanned utilizing a multidetector 
helical scanner from the lung apex   through the level of the adrenal glands wit
hout administration of IV contrast.   Coronal and sagittal reformations were obt
ained. PE protocol was performed.        IV CONTRAST: 100 mL of Isovue-370      
COMPLICATIONS: None      RADIATION DOSE:         Total DLP: 625.55 mGy*cm       
 Estimated effective dose: (DLP x 0.014 x size factor) mSv        CTDIvol has be
en reviewed. It is below the limits set by the Radiation   Protocol Committee (R
PC).      FINDINGS:      LINES/ TUBES: None.      LUNGS AND AIRWAYS:  No evidenc
e of pulmonary embolism to the segmental level. 7   mm left upper lobe nodule (s
eries 3, image 28). Spiculated 8 mm left upper lobe   nodule (3/37). 5 mm right 
middle lobe nodule (3/83). 3 mm subpleural right   upper lobe nodule (3/46). Dep
endent atelectasis. Airways are normal.      PLEURA: The pleural spaces are von
r.      HEART AND MEDIASTINUM: The thyroid gland is normal.  No mediastinal or h
ilar   lymphadenopathy. Enlarged right axillary and subpectoralis lymph nodes,  
 measuring up to 1.3 cm (series 2, images 36, 26, and 24). The heart is normal  
 in size.. There is no pericardial effusion.  Moderate to severe atherosclerotic
   calcification of aorta and coronary arteries. Main pulmonary artery measures 
  2.5 cm.      UPPER ABDOMEN: Cholelithiasis.      BONES: Mild thoracic spine sc
oliosis and degenerative changes.      SOFT TISSUES: Evidence of right mastectom
y.      IMPRESSION:    No evidence of pulmonary embolism.   Lung nodules as abov
e, concerning for recurrent disease.   Right axillary lymphadenopathy.          
  Signed by: Dr. Rachel Brasher MD on 2018 9:36 AM        Dictated By: BABA MAGALI BRASHER MD  Electronically Signed By: RACHEL BRASHER MD on 18  Trans
cribed By: TRAVIS on 18       COPY TO:   BECKY RAMIREZ MD        
Triglycerides Mvmgm3636-01-20 04:11:00* 



             Test Item    Value        Reference Range Interpretation Comments

 

             Triglycerides Level (test code = 2571-8) 112          0-149        

              





[facility] NorthwestCholesterol Zvsye2522-12-61 04:11:00* 



             Test Item    Value        Reference Range Interpretation Comments

 

             Cholesterol Level (test code = 2093-3) 132          0-199          

            





Less than 200 mg/dL       Low Oswk776 - 239 mg/dL           Borderline Hzqy499 m
g/dl and greater     High Risk                        [facility] NorthwestLDL Cyqdchfayjr8530-83-34 04:11:00* 



             Test Item    Value        Reference Range Interpretation Comments

 

             LDL Cholesterol (test code = 2089-1) 64                      

          





[facility] NorthwestHDL Oabbrjpfnxc2787-39-27 04:11:00* 



             Test Item    Value        Reference Range Interpretation Comments

 

             HDL Cholesterol (test code = 2085-9) 46           40-60            

          





[facility] NorthwestCholesterol/HDL Hrtha1972-89-72 04:11:00
  * 



             Test Item    Value        Reference Range Interpretation Comments

 

             Cholesterol/HDL Ratio (test code = 9830-1) 2.9          3.9-4.7    

  L             





[facility] NorthwestTriglycerides Hqtof5198-67-53 04:11:00* 



             Test Item    Value        Reference Range Interpretation Comments

 

             Triglycerides Level (test code = 2571-8) 112          0-149        

              





[facility] NorthwestCholesterol Gueab1125-65-64 04:11:00* 



             Test Item    Value        Reference Range Interpretation Comments

 

             Cholesterol Level (test code = 2093-3) 132          0-199          

            





Less than 200 mg/dL       Low Ecje781 - 239 mg/dL           Borderline Cvok713 m
g/dl and greater     High Risk                        [facility] NorthwestLDL Bqabxspvglb2702-05-59 04:11:00* 



             Test Item    Value        Reference Range Interpretation Comments

 

             LDL Cholesterol (test code = 2089-1) 64                      

          





[facility] NorthwestHDL Znigengphhz1906-37-19 04:11:00* 



             Test Item    Value        Reference Range Interpretation Comments

 

             HDL Cholesterol (test code = 2085-9) 46           40-60            

          





[facility] NorthwestCholesterol/HDL Nlurh6613-22-62 04:11:00
  * 



             Test Item    Value        Reference Range Interpretation Comments

 

             Cholesterol/HDL Ratio (test code = 9830-1) 2.9          3.9-4.7    

  L             





[facility] NorthwestCreatine Jtllin6060-22-28 03:55:00* 



             Test Item    Value        Reference Range Interpretation Comments

 

             Creatine Kinase (test code = 2157-6) 95                      

          





[facility] NorthwestCreatine Kinase QA6503-25-40 03:55:00* 



             Test Item    Value        Reference Range Interpretation Comments

 

             Creatine Kinase MB (test code = 05248-9) 2.10         0-5.0        

              





[facility] NorthwestTrCam[facility] Hospitalnin -65-02 03:55:00* 



             Test Item    Value        Reference Range Interpretation Comments

 

             Troponin I (test code = OES8698) 0.009        0-0.300              

      





[facility] NorthwestCreatine Lupyzl2396-60-27 03:55:00* 



             Test Item    Value        Reference Range Interpretation Comments

 

             Creatine Kinase (test code = 2157-6) 95                      

          





[facility] NorthwestCreatine Kinase EE1355-60-11 03:55:00* 



             Test Item    Value        Reference Range Interpretation Comments

 

             Creatine Kinase MB (test code = 89658-2) 2.10         0-5.0        

              





[place] [address]4763018-08-11 03:55:00* 



             Test Item    Value        Reference Range Interpretation Comments

 

             Troponin I (test code = AYR2926) 0.009        0-0.300              

      





[facility] NorthwestThyroid Stimulating Hormone (TSH)
2018-08-10 15:06:00* 



             Test Item    Value        Reference Range Interpretation Comments

 

             Thyroid Stimulating Hormone (TSH) (test code = 62676-3) 1.482      

  0.350-4.940                





[facility] NorthwestThyroid Stimulating Hormone (TSH)
2018-08-10 15:06:00* 



             Test Item    Value        Reference Range Interpretation Comments

 

             Thyroid Stimulating Hormone (TSH) (test code = 28851-3) 1.482      

  0.350-4.940                





[facility] NorthwestCHEST SINGLE (NOT PORTABLE)2018-08-10 
12:38:00    [place] [address]      Patient Name: ANI GUALLPA   MR 
#: [id number]    : 1945 Age/Sex: 72/M  Acct #: [id number] Req #: [id number]
[id number]  Adm Physician:     Ordered by: JANAE ZAVALA NP  Report #: [id number]
[id number]   Location: ER  Room/Bed:     
__________________________________________________________________
_________________________________    Procedure: [id number] DX/CHEST SINGLE (NOT P
ORTABLE)  Exam Date: 08/10/18                            Exam Time: 1035       R
EPORT STATUS: Signed    Examination: Single AP view of the chest.      COMPARISO
N: Chest 2 views 2013      INDICATION: Chest pain          IMPRESSION: Exa
m limited by patient rotation.       1.  Lines and Tubes: None   2.  Lungs are w
ell-inflated. No nodules, masses or consolidation. Linear   opacity in the left 
lower lung, which may represent subsegmental atelectasis or   scarring.   3.  Ca
rdiomediastinal silhouette is normal.   Pulmonary vasculature is normal.   4.  M
etallic clips project over the right axillary region. No acute bony   abnormalit
ies.      Signed by: Dr. Clifton Silveira M.D. on 8/10/2018 12:40 PM        Di
ctated By: CLIFTON SILVEIRA MD  Electronically Signed By: CLIFTON SILVEIRA MD on 08/
10/18 1240  Transcribed By: TRAVIS on 08/10/18 1240       COPY TO:   BRYANNA ZAVALA NP        SP LUMBAR, COMPLETE MIN 4VW2018-08-10 12:31:00    [place] [address]      Patient Name: ANI GUALLPA   MR #: [id number]    : 
1945 Age/Sex: 72/M  Acct #: [id number] Req #: [id number]  Adm Physician:
     Ordered by: JANAE ZAVALA NP  Report #: [id number]   Location: ER  
Room/Bed:     __________________________________________________________________
_________________________________    Procedure: [id number] DX/SP LUMBAR, COMPLETE
 MIN 4VW  Exam Date: 08/10/18                            Exam Time: 1035       R
EPORT STATUS: Signed    EXAMINATION: Lumbar spine series.      CLINICAL HISTORY:
 Chest pain, low back pain      COMPARISON:  None.        DISCUSSION:  5 views o
f the lumbar spine are submitted for interpretation.    Five nonrib-bearing lumb
ar type vertebral bodies are identified. No acute,   displaced fractures or subl
uxation.  Multilevel moderate degenerative disc   changes in the lumbosacral spi
ne with associated levoscoliosis, worse at L3-L4,   L5-S1. Facet hypertrophy L4-
L5 and L5-S1. Grade 1 anterolisthesis of L4 on L3.    Vertebral body heights are
 preserved. Moderate intervertebral disc space   narrowing with vacuum phenomeno
n at L3-L4 and L5-S1. Bilateral oblique views   show no spondylolysis. Degenerat
franklyn changes in bilateral sacroiliac joints.   Marked atherosclerotic calcificati
on of the abdominal aorta.   Ill-defined somewhat linear calcific densities proj
ecting in the left upper   quadrant left 12th rib as well as right upper quadran
t over the L1 right   transverse process are likely vascular in nature.      IMP
RESSION:       1. No acute abnormalities. Multilevel degenerative disc and joint
 disease with   associated levoscoliosis         The staff physician below has p
ersonally reviewed this exam on the date of   dictation.                  Signed
 by: Dr. Clifton Silveira M.D. on 8/10/2018 12:34 PM        Dictated By: CHRISTOPHER SILVEIRA MD  Electronically Signed By: CLIFTON SILVEIRA MD on 08/10/18 1234  [place]
scribed By: TRAVIS on 08/10/18 1234       COPY TO:   JANAE ZAVALA NP      
  Urine WBC2018-08-10 12:27:00* 



             Test Item    Value        Reference Range Interpretation Comments

 

             Urine WBC (test code = 5821-4) NONE         0-5                    

    





[facility] 12:27:00* 



             Test Item    Value        Reference Range Interpretation Comments

 

             Urine RBC (test code = 74974-1) NONE         0-5                   

     





[facility] Bacteria2018-08-10 12:27:00* 



             Test Item    Value        Reference Range Interpretation Comments

 

             Urine Bacteria (test code = 79308-4) NONE         NONE             

          





[facility] Epithelial Cells2018-08-10 12:27:00
  * 



             Test Item    Value        Reference Range Interpretation Comments

 

             Urine Epithelial Cells (test code = 64516-8) RARE         NONE     

                  





[facility] Color2018-08-10 12:14:00* 



             Test Item    Value        Reference Range Interpretation Comments

 

             Urine Color (test code = 5778-6) YELLOW       YELLOW               

      





[facility] Clarity2018-08-10 12:14:00* 



             Test Item    Value        Reference Range Interpretation Comments

 

             Urine Clarity (test code = 63981-3) CLEAR        CLEAR             

         





[facility] Specific Gravity2018-08-10 12:14:00
  * 



             Test Item    Value        Reference Range Interpretation Comments

 

             Urine Specific Gravity (test code = 5811-5) 1.025        1.010-1.02

5                





[facility] pH2018-08-10 12:14:00* 



             Test Item    Value        Reference Range Interpretation Comments

 

             Urine pH (test code = 03605-6) 6            5-7                    

    





[facility] Leukocyte Esterase2018-08-10 
12:14:00* 



             Test Item    Value        Reference Range Interpretation Comments

 

             Urine Leukocyte Esterase (test code = 5799-2) NEGATIVE     NEGATIVE

                   





[facility] Nitrite2018-08-10 12:14:00* 



             Test Item    Value        Reference Range Interpretation Comments

 

             Urine Nitrite (test code = 74035-9) NEGATIVE     NEGATIVE          

         





[facility] Protein2018-08-10 12:14:00* 



             Test Item    Value        Reference Range Interpretation Comments

 

             Urine Protein (test code = 5804-0) NEGATIVE     NEGATIVE           

        





[facility] Glucose (UA)2018-08-10 12:14:00* 



             Test Item    Value        Reference Range Interpretation Comments

 

             Urine Glucose (UA) (test code = 2349-9) NEGATIVE     NEGATIVE      

             





[facility] Ketones2018-08-10 12:14:00* 



             Test Item    Value        Reference Range Interpretation Comments

 

             Urine Ketones (test code = 93314-9) NEGATIVE     NEGATIVE          

         





[facility] Urobilinogen2018-08-10 12:14:00* 



             Test Item    Value        Reference Range Interpretation Comments

 

             Urine Urobilinogen (test code = 24867-3) 0.2          0.2-1        

              





[facility] Bilirubin2018-08-10 12:14:00* 



             Test Item    Value        Reference Range Interpretation Comments

 

             Urine Bilirubin (test code = 1978-6) NEGATIVE     NEGATIVE         

          





[facility] Blood2018-08-10 12:14:00* 



             Test Item    Value        Reference Range Interpretation Comments

 

             Urine Blood (test code = 23743-0) TRACE        NEGATIVE     H      

       





[facility] Northwest[facility]2018-08-10 11:07:00    [facility]   [address] [address]      Patient Name: ANI GUALLPA   MR #: [id number]    : 
1945 Age/Sex: 72/M  Acct #: [id number] Req #: [id number]  Adm Physician:
 MICHAEL RAMIREZ MD    Ordered by: JANAE ZAVALA NP  Report #: [id number]   
Location: MED/SURG  Room/Bed: [facility]    _______________________________________
____________________________________________________________    Procedure: [id number]
[id number] CT/CT BRAIN WO  Exam Date: 08/10/18                            Exam Time: 1
030       REPORT STATUS: Signed       ******** ADDENDUM #1 ********      Dose mo
dulation, iterative reconstruction, and/or weight based adjustment of   the mA/k
V was utilized to reduce the radiation dose to as low as reasonably   achievable
.      Signed by: Dr. Tonio Herrera M.D. on 2018 10:01 AM   ******** ORIGINA
L REPORT ********      EXAMINATION: Head CT       HISTORY: Generalized weakness 
since the day before, chest and lower back pain,   history of breast cancer and 
lung cancer.   COMPARISON: Brain MRI on 2013   TECHNIQUE: Multidetector ax
ial images were obtained without contrast from the   foramen magnum to the verte
x . The images were reconstructed using brain and   bone algorithms.  Thin secti
on brain images were reformatted into coronal and   sagittal planes.   Intraveno
us contrast: None.    Image quality: Motion/streaking artifact limits the evalua
tion of the skull   base and posterior cranial fossa.      FINDINGS:           P
arenchyma:    1.  Stable mild chronic microvascular ischemic changes.   2.  Smal
l chronic lacunar infarct in the left caudate nucleus was not seen on   prior MR
I in 2019.   3.  No mass or hemorrhage. No CT evidence of acute territoria
l vascular insult.                  Extra-axial spaces:No abnormal density. No e
xtra-axial fluid collections         Brain volume: Normal for age.         Ventr
icles: No hydrocephalus or displacement.          Arteries: No density suggestiv
e of thrombus.         Dural sinuses: No abnormal density.         Extra-axial s
paces: No abnormal density.         Foramen magnum: No mass, Chiari malformation
, or basilar invagination.         Sella: No obvious mass.          Paranasal/ma
stoid sinuses: Imaged portions unremarkable.         Skull/Scalp: No lytic or bl
astic lesions.  No fractures.       IMPRESSION:      1.  No intracranial mass, h
emorrhage vasogenic edema or mass effect.   2.  Persistent minimal chronic micro
vascular ischemic changes.   3.  Small chronic lacunar infarct in the left basal
 ganglia that was not seen   on prior MRI dated 2015.      Signed by: Dr. Tonio Herrera M.D. on 8/10/2018 11:11 AM        Dictated By: TONIO HERRERA MD  Elec
tronically Signed By: TONIO HERRERA MD on 18 1001  Transcribed By: TRAVIS harman 08/10/18 1111       COPY TO:   JANAE ZAVALA NP        Sodium Level
2018-08-10 11:03:00* 



             Test Item    Value        Reference Range Interpretation Comments

 

             Sodium Level (test code = 2951-2) 142          136-145             

       





[facility] NorthwestPotassium Level2018-08-10 11:03:00* 



             Test Item    Value        Reference Range Interpretation Comments

 

             Potassium Level (test code = 2823-3) 3.6          3.5-5.1          

          





[facility] NorthwestChloride Level2018-08-10 11:03:00* 



             Test Item    Value        Reference Range Interpretation Comments

 

             Chloride Level (test code = 2075-0) 109                 H    

         





[facility] NorthwestCarbon Dioxide Level2018-08-10 11:03:00* 



             Test Item    Value        Reference Range Interpretation Comments

 

             Carbon Dioxide Level (test code = 2028-9) 23           22-29       

               





[facility] NorthwestAnion Gap2018-08-10 11:03:00* 



             Test Item    Value        Reference Range Interpretation Comments

 

             Anion Gap (test code = 33037-3) 13.6         8-16                  

     





[facility] NorthwestBlood Urea Nitrogen2018-08-10 11:03:00* 



             Test Item    Value        Reference Range Interpretation Comments

 

             Blood Urea Nitrogen (test code = 3094-0) 14           7-26         

              





[facility] NorthwestCreatinine2018-08-10 11:03:00* 



             Test Item    Value        Reference Range Interpretation Comments

 

             Creatinine (test code = 2160-0) 0.88         0.72-1.25             

     





[facility] NorthwestBUN/Creatinine Ratio2018-08-10 11:03:00* 



             Test Item    Value        Reference Range Interpretation Comments

 

             BUN/Creatinine Ratio (test code = 3097-3) 16           6-25        

               





[facility] NorthwestEstimat Glomerular Filtration Rate
2018-08-10 11:03:00* 



             Test Item    Value        Reference Range Interpretation Comments

 

             Estimat Glomerular Filtration Rate (test code = 34771-4) 60-       

   >60                        





Ranges were taken from the National Kidney Disease Education Program and the [facility] literature.Reference ranges:60 or greater: [id number] (
for 3 consecutive months): Chronic kidney disease 15 or less: Kidney failureCorpus Christi Medical Center NorthwestGlucose Level2018-08-10 11:03:00* 



             Test Item    Value        Reference Range Interpretation Comments

 

             Glucose Level (test code = FXJ0334) 116                      

         





[facility] NorthwestCalcium Level2018-08-10 11:03:00* 



             Test Item    Value        Reference Range Interpretation Comments

 

             Calcium Level (test code = 83632-0) 9.3          8.4-10.2          

         





[facility] NorthwestTotal Bilirubin2018-08-10 11:03:00* 



             Test Item    Value        Reference Range Interpretation Comments

 

             Total Bilirubin (test code = 1975-2) 1.1          0.2-1.2          

          





[facility] NorthwestAspartate Amino Transf (AST/SGOT)
2018-08-10 11:03:00* 



             Test Item    Value        Reference Range Interpretation Comments

 

                                        Aspartate Amino Transf (AST/SGOT) (test 

code = Aspartate Amino Transf 

(AST/SGOT))     25              5-34                             





[facility] NorthwestAlanine Aminotransferase (ALT/SGPT)
2018-08-10 11:03:00* 



             Test Item    Value        Reference Range Interpretation Comments

 

             Alanine Aminotransferase (ALT/SGPT) (test code = 1742-6) 18        

   0-55                       





[facility] NorthwestTotal Protein2018-08-10 11:03:00* 



             Test Item    Value        Reference Range Interpretation Comments

 

             Total Protein (test code = 2885-2) 6.9          6.5-8.1            

        





[facility] NorthwestAlbumin2018-08-10 11:03:00* 



             Test Item    Value        Reference Range Interpretation Comments

 

             Albumin (test code = 1751-7) 3.4          3.5-5.0      L           

  





[facility] NorthwestGlobulin2018-08-10 11:03:00* 



             Test Item    Value        Reference Range Interpretation Comments

 

             Globulin (test code = 81882-5) 3.5          2.3-3.5                

    





[facility] NorthwestAlbumin/Globulin Ratio2018-08-10 11:03:00
  * 



             Test Item    Value        Reference Range Interpretation Comments

 

             Albumin/Globulin Ratio (test code = 1759-0) 1.0          0.8-2.0   

                 





[facility] NorthwestAlkaline Phosphatase2018-08-10 11:03:00* 



             Test Item    Value        Reference Range Interpretation Comments

 

             Alkaline Phosphatase (test code = 6768-6) 63                 

               





[facility] NorthwestActivated Partial Thromboplast Time
2018-08-10 10:50:00* 



             Test Item    Value        Reference Range Interpretation Comments

 

             Activated Partial Thromboplast Time (test code = 99730-9) 29.4     

    23.8-35.5                  





[facility] NorthwestProthrombin Time2018-08-10 10:48:00* 



             Test Item    Value        Reference Range Interpretation Comments

 

             Prothrombin Time (test code = 5902-2) 15.2         11.9-14.5    H  

           





[facility] NorthwestProthromb Time International Ratio
2018-08-10 10:48:00* 



             Test Item    Value        Reference Range Interpretation Comments

 

             Prothromb Time International Ratio (test code = 6301-6) 1.30       

                             





Oral Anticoagulant Therapy INR Values:1. Low Intensity Therapy        1.5 - 2.02
. Moderate Intensity Therapy   2.0 - 3.03. High Intensity Therapy(1)    2.5 - 3.
54. High Intensity Therapy(2)    3.0 - 4.05. Panic Value INR              > 5.0
[facility] NorthwestWhite Blood Count2018-08-10 10:39:00* 



             Test Item    Value        Reference Range Interpretation Comments

 

             White Blood Count (test code = 6690-2) 6.18         4.8-10.8       

            





[facility] NorthwestRed Blood Count2018-08-10 10:39:00* 



             Test Item    Value        Reference Range Interpretation Comments

 

             Red Blood Count (test code = 789-8) 3.77         4.3-5.7      L    

         





[facility] NorthwestHemoglobin2018-08-10 10:39:00* 



             Test Item    Value        Reference Range Interpretation Comments

 

             Hemoglobin (test code = 27553-3) 12.1         14.0-18.0    L       

      





[facility] NorthwestHematocrit2018-08-10 10:39:00* 



             Test Item    Value        Reference Range Interpretation Comments

 

             Hematocrit (test code = 4544-3) 36.3         38.2-49.6    L        

     





[facility] NorthwestMean Corpuscular Volume2018-08-10 
10:39:00* 



             Test Item    Value        Reference Range Interpretation Comments

 

             Mean Corpuscular Volume (test code = 787-2) 96.3         81-99     

                 





[facility] NorthwestMean Corpuscular Hemoglobin2018-08-10 
10:39:00* 



             Test Item    Value        Reference Range Interpretation Comments

 

             Mean Corpuscular Hemoglobin (test code = 785-6) 32.1         28-32 

       H             





[facility] NorthwestMean Corpuscular Hemoglobin Concent
2018-08-10 10:39:00* 



             Test Item    Value        Reference Range Interpretation Comments

 

             Mean Corpuscular Hemoglobin Concent (test code = 786-4) 33.3       

  31-35                      





[facility] NorthwestRed Cell Distribution Width2018-08-10 
10:39:00* 



             Test Item    Value        Reference Range Interpretation Comments

 

             Red Cell Distribution Width (test code = 51679-1) 12.7         11.7

-14.4                  





[facility] NorthwestPlatelet Count2018-08-10 10:39:00* 



             Test Item    Value        Reference Range Interpretation Comments

 

             Platelet Count (test code = 777-3) 227          140-360            

        





[facility] NorthwestNeutrophils (%) (Auto)2018-08-10 10:39:00
  * 



             Test Item    Value        Reference Range Interpretation Comments

 

             Neutrophils (%) (Auto) (test code = 49268-7) 59.4         38.7-80.0

                  





[facility] NorthwestLymphocytes (%) (Auto)2018-08-10 10:39:00
  * 



             Test Item    Value        Reference Range Interpretation Comments

 

             Lymphocytes (%) (Auto) (test code = 736-9) 18.4         18.0-39.1  

                





[facility] NorthwestMonocytes (%) (Auto)2018-08-10 10:39:00* 



             Test Item    Value        Reference Range Interpretation Comments

 

             Monocytes (%) (Auto) (test code = 5905-5) 10.2         4.4-11.3    

               





[facility] NorthwestEosinophils (%) (Auto)2018-08-10 10:39:00
  * 



             Test Item    Value        Reference Range Interpretation Comments

 

             Eosinophils (%) (Auto) (test code = 713-8) 10.8         0.0-6.0    

  H             





[facility] NorthwestBasophils (%) (Auto)2018-08-10 10:39:00* 



             Test Item    Value        Reference Range Interpretation Comments

 

             Basophils (%) (Auto) (test code = 706-2) 1.0          0.0-1.0      

              





[facility] NorthwestIM GRANULOCYTES %2018-08-10 10:39:00* 



             Test Item    Value        Reference Range Interpretation Comments

 

             IM GRANULOCYTES % (test code = IM GRANULOCYTES %) 0.2          0.0-

1.0                    





[facility] NorthwestNeutrophils # (Auto)2018-08-10 10:39:00* 



             Test Item    Value        Reference Range Interpretation Comments

 

             Neutrophils # (Auto) (test code = 751-8) 3.7          2.1-6.9      

              





[facility] NorthwestLymphocytes # (Auto)2018-08-10 10:39:00* 



             Test Item    Value        Reference Range Interpretation Comments

 

             Lymphocytes # (Auto) (test code = 21037-5) 1.1          1.0-3.2    

                





[facility] NorthwestMonocytes # (Auto)2018-08-10 10:39:00* 



             Test Item    Value        Reference Range Interpretation Comments

 

             Monocytes # (Auto) (test code = 742-7) 0.6          0.2-0.8        

            





[facility] NorthwestEosinophils # (Auto)2018-08-10 10:39:00* 



             Test Item    Value        Reference Range Interpretation Comments

 

             Eosinophils # (Auto) (test code = 711-2) 0.7          0.0-0.4      

H             





[facility] NorthwestBasophils # (Auto)2018-08-10 10:39:00* 



             Test Item    Value        Reference Range Interpretation Comments

 

             Basophils # (Auto) (test code = 704-7) 0.1          0.0-0.1        

            





[facility] NorthwestAbsolute Immature Granulocyte (auto
2018-08-10 10:39:00* 



             Test Item    Value        Reference Range Interpretation Comments

 

                                        Absolute Immature Granulocyte (auto (norma

t code = Absolute Immature Granulocyte 

(auto)          0.01            0-0.1                            





[facility]

## 2020-09-02 NOTE — XMS REPORT
Continuity of Care Document

                             Created on: 2020



ANI GUALLPA

External Reference #: [id number]

: 1945

Sex: Male



Demographics





                          Address                   [address] [place]  [address]

 

                          Home Phone                [phone]

 

                          Preferred Language        English

 

                          Marital Status            Unknown

 

                          Zoroastrian Affiliation     Unknown

 

                          Race                      Unknown

 

                                        Additional Race(s)  

 

                          Ethnic Group              Unknown





Author





                          Author                    [facility]

 

                          Address                   [address]

[place]  [address]



 

                          Phone                     Unavailable







Support





                Name            Relationship    Address         Phone

 

                    KAMRYN GUALLPA    PRS                 [address] [place]  [id number] [phone]

 

                    SHABANA SCHULER       PRS                 [address] [place]  [address] [phone]







Care Team Providers





                    Care Team Member Name Role                Phone

 

                    NONSTAFF            PCP                 Unavailable

 

                    OMAYRA Souza Attphys             Unavailable

 

                    NANDO CA  Attphys             Unavailable

 

                    KENYETTA,  XANDER     Attphys             Unavailable

 

                    PETE RAMIREZ YICHING      Attphys             Unavailable

 

                    KENYETTA,  XANDER     Admphys             Unavailable

 

                    PETE RAMIREZ YICHING      Admphys             Unavailable







Payers





           Payer Name Policy Type Policy Number Effective Date Expiration Date S

patty

 

           [employer]            [id number]  2013 00:00:00            

[facility]







Problems





           Condition Name Condition Details Condition Category Status     Onset 

Date Resolution

Date            Last Treatment Date Treating Clinician Comments        Source

 

                    Secondary malignant neoplasm of bone Secondary Malignant Girma

plasm of Bone 

Problem   Active    2018 00:00:00                                         

[facility]

 

             Atherosclerosis of aorta Atherosclerosis of Aorta Problem      Acti

ve       2018 

00:00:00                                                         [facility]

 

             Malignant tumor of lung Malignant Tumor of Lung Problem      Active

       2017 

00:00:00                                                         [facility]

 

          Hypertriglyceridemia Hypertriglyceridemia Problem   Active     00:00:00            

                                                            [facility]

ice

 

       Anxiety Anxiety Problem Active 2017 00:00:00                       

      [facility]

 

             Benign essential hypertension Benign Essential Hypertension Problem

      Active       

2017 00:00:00                                                     [facility]

 

        Pulmonary emphysema Pulmonary Emphysema Problem Active  2017 00:00

:00                  

                                                    [facility]

 

                    History of malignant neoplasm of breast History of Malignant

 Neoplasm of Breast 

Problem   Active    2017 00:00:00                                         

[facility]

 

       Small bowel obstruction Small bowel obstruction Problem Active           

                         [facility]







Allergies, Adverse Reactions, Alerts





        Allergy Name Allergy Type Status  Severity Reaction(s) Onset Date Inacti

ve Date 

Treating Clinician        Comments                  Source

 

       Morphine Allergy to Substance Active               2020 00:00:00   

                   [facility]

 

       morphine DA     Active MO            2020 00:00:00                 

     [facility]

 

       thimerosal DA     Active U             2011 00:00:00               

       [facility]

 

       Augmentin Allergy to substance Active        Diarrhea                    

         [facility]

 

       Cefuroxime Allergy to substance Active        Diarrhea                   

          [facility]







Social History





                Smoking Status  Start Date      Stop Date       Source

 

                Heavy Tobacco Smoker                                 [facility]







Medications





             Ordered Medication Name Filled Medication Name Start Date   Stop Da

te    Current 

Medication? Ordering Clinician Indication Dosage     Frequency  Signature (SIG) 

Comments                  Components                Source

 

                          Bactrim  mg-160 mg tablet Take 1 tablet twice a 

day by oral route. Bactrim

 mg-160 mg tablet Take 1 tablet twice a day by oral route.                

     No                            1         

BID                 Bactrim  mg-160 mg tablet Take 1 tablet twice a day by

 oral route.                      

                                        [facility]

 

                                        Breo Ellipta 200 mcg-25 mcg/dose powder 

for inhalation INHALE 1 PUFF BY MOUTH 

EVERY DAY AS DIRECTED                   Breo Ellipta 200 mcg-25 mcg/dose powder 

for inhalation 

INHALE 1 PUFF BY MOUTH EVERY DAY AS DIRECTED                 No                 

                     Breo Ellipta 200 

mcg-25 mcg/dose powder for inhalation INHALE 1 PUFF BY MOUTH EVERY DAY AS 
DIRECTED                                                    [facility]

ice

 

                                        clotrimazole-betamethasone 1 %-0.05 % to

pical cream APPLY TO THE AFFECTED AND 

SURROUNDING AREAS OF SKIN BY TOPICAL ROUTE 2 TIMES PER DAY IN THE MORNING AND 
EVENING FOR 2 WEEKS                     clotrimazole-betamethasone 1 %-0.05 % to

pical cream APPLY TO

THE AFFECTED AND SURROUNDING AREAS OF SKIN BY TOPICAL ROUTE 2 TIMES PER DAY IN 
THE MORNING AND EVENING FOR 2 WEEKS                 No                          

            clotrimazole-betamethasone 1 

%-0.05 % topical cream APPLY TO THE AFFECTED AND SURROUNDING AREAS OF SKIN BY 
TOPICAL ROUTE 2 TIMES PER DAY IN THE MORNING AND EVENING FOR 2 WEEKS            

                             [facility]

 

                                        Combivent Respimat 20 mcg-100 mcg/actuat

ion solution for inhalation 1 puff 4 

times a day                             Combivent Respimat 20 mcg-100 mcg/actuat

ion solution for inhalation 

1 puff 4 times a day                 No                                      Com

bivent Respimat 20 mcg-100 mcg/actuation 

solution for inhalation 1 puff 4 times a day                                    

     [facility]

 

                          erythromycin with ethanol 2 % topical solution erythro

mycin with ethanol 2 % 

topical solution                 No                                      erythro

mycin with ethanol 2 % topical solution  

                                                    [facility]

 

                                        hydrocodone 7.5 mg-acetaminophen 325 mg 

tablet Take 1 tablet every 6 hours by 

oral route.                             hydrocodone 7.5 mg-acetaminophen 325 mg 

tablet Take 1 tablet every 6

hours by oral route.                 No                                      hyd

rocodone 7.5 mg-acetaminophen 325 mg 

tablet Take 1 tablet every 6 hours by oral route.                               

          [facility]

 

                          loratadine 10 mg capsule Take 1 capsule every day by o

ral route. loratadine 10 

mg capsule Take 1 capsule every day by oral route.                 No           

           1capsule(s) Q1D     

loratadine 10 mg capsule Take 1 capsule every day by oral route.                

                         [facility]

 

                          lorazepam 1 mg tablet Take 1 tablet every day by oral 

route. lorazepam 1 mg 

tablet Take 1 tablet every day by oral route.                 No                

                      lorazepam 1 mg 

tablet Take 1 tablet every day by oral route.                                   

      [facility]

 

                          multivitamin tablet Take 1 tablet every day by oral ro

[place]. multivitamin tablet 

Take 1 tablet every day by oral route.                 No                      1

       Q1D     multivitamin tablet Take

1 tablet every day by oral route.                                         [facility]

[facility]

 

                          PreviDent 5000 Booster Plus 1.1 % dental paste i daily

 PreviDent 5000 Booster 

Plus 1.1 % dental paste i daily                 No                              

        PreviDent 5000 Booster Plus 1.1 %

dental paste i daily                                         [facility]

opal

 

        Tarceva 150 mg tablet 1 tab daily Tarceva 150 mg tablet 1 tab daily     

            No                       

                          Tarceva 150 mg tablet 1 tab daily                     

      [facility]

 

                          Viagra 100 mg tablet TAKE 1 TABLET BY MOUTH AS DIRECTE

D Viagra 100 mg tablet 

TAKE 1 TABLET BY MOUTH AS DIRECTED                 No                           

           Viagra 100 mg tablet TAKE 1 

TABLET BY MOUTH AS DIRECTED                                         [facility]

 

       Vitamin B12 1 tab a day Vitamin B12 1 tab a day               No         

                        Vitamin B12 1 tab

a day                                                       [facility]

ice

 

       Vitamin C 1 tab a day Vitamin C 1 tab a day               No             

                    Vitamin C 1 tab a day

                                                            [facility]

ice

 

       vitamin E 1 tab a day vitamin E 1 tab a day               No             

                    vitamin E 1 tab a day

                                                            [facility]

ice

 

                          Albuterol Sulfate (Ventolin Hfa) 18 Gm Hfa.aer.ad Albu

terol Sulfate (Ventolin 

Hfa) 18 Gm Hfa.aer.ad             Yes                           As Needed       

      [facility]

 

                    Ascorbic Acid (Vitamin C) 60 Mg Lozenge Ascorbic Acid (Vitam

in C) 60 Mg Lozenge 

              Yes                  1             Daily                [facility]

 

             Aspirin (Aspir-Maude) 325 Mg Tablet. Aspirin (Aspir-Maude) 325 Mg T

ablet.                           

Yes                     81              Bedtime                 [facility]

 

                          Bifidobacterium Infantis (Align) 4 Mg Capsule Bifidoba

cterium Infantis (Align) 4

Mg Capsule             Yes               1           Bedtime             [facility]

 

       Dicyclomine Hcl 10 Mg Capsule Dicyclomine Hcl 10 Mg Capsule              

 Yes                  1             Four

Times Daily                                                 [facility]

 

     Famotidine 20 Mg Tab Famotidine 20 Mg Tab           Yes            40      

  Daily           [facility]

 

                          Hydrocodone Bit/Acetaminophen (Hydrocodon-Acetaminoph 

7.5-300) 1 Each Tablet 

Hydrocodone Bit/Acetaminophen (Hydrocodon-Acetaminoph 7.5-300) 1 Each Tablet    

                        

        Yes                     7.5             As Needed                 [facility]

 

             Linaclotide (Linzess) 72 Mcg Capsule Linaclotide (Linzess) 72 Mcg C

apsule                           

Yes                                     Daily                   [facility]

 

     Loratadine 10 Mg Tablet Loratadine 10 Mg Tablet           Yes            10

        Bedtime           [facility]

 

       Lorazepam (Ativan) 1 Mg Tablet Lorazepam (Ativan) 1 Mg Tablet            

   Yes                  1             As

Needed as needed for Anxiety                                         [facility]

 

                          Multivitamin (Multivitamins) 1 Each Capsule Multivitam

in (Multivitamins) 1 Each 

Capsule             Yes               1           Daily             [facility]

 

                          Vitamin E Mixed (Vitamin E) 400 Unit Capsule Vitamin E

 Mixed (Vitamin E) 400 

Unit Capsule             Yes                           Daily             [facility]

 

                          Wheat Dextrin (Benefiber) 1 Each Powd.pack Wheat Dextr

in (Benefiber) 1 Each 

Powd.pack             Yes                           Daily             [facility]

 

                          Albuterol/Ipratropium (Combivent Inhaler) 14.7 Gm Aero

, 1 Inh Inhalation 

Albuterol/Ipratropium (Combivent Inhaler) 14.7 Gm Aero, 1 Inh Inhalation        

                   

2020 00:00:00 No                      1               Four Times Daily    

             [facility]

 

                          Clemastine Fumarate (Allergy Relief) 1.34 Mg Tablet, 1

 Tab Oral Clemastine 

Fumarate (Allergy Relief) 1.34 Mg Tablet, 1 Tab Oral              2020 00:

00:00 No                        

           1                     Daily                            [facility]

 

                          Tramadol Hcl (Ultram) 50 Mg Tablet, 50 Mg Oral Tramado

l Hcl (Ultram) 50 Mg 

Tablet, 50 Mg Oral         2020 00:00:00 No                      50       

       Every 6 Hours as needed for

Pain                                                        [facility]

 

                Erlotinib Hcl (Tarceva) 150 Mg Tablet, Erlotinib Hcl (Tarceva) 1

50 Mg Tablet,                 

2020 00:00:00 No                                                          

    [facility]

 

                          Levofloxacin (Levaquin) 500 Mg Tablet, 500 Mg Oral Lev

ofloxacin (Levaquin) 500 

Mg Tablet, 500 Mg Oral       2020 00:00:00 No                500         D

aily             [facility]

 

                Prednisone 10 Mg Tab, 10 Mg Oral Prednisone 10 Mg Tab, 10 Mg Ora

l                 2020 

00:00:00 No                      10              As Directed                 [facility]

 

                          Ranitidine Hcl (Acid Control) 75 Mg Tablet, 75 Mg Oral

 Ranitidine Hcl (Acid 

Control) 75 Mg Tablet, 75 Mg Oral       2020 00:00:00 No                75

          Bedtime             

[facility]

 

                Gemfibrozil 600 Mg Tablet, 600 Mg Oral Gemfibrozil 600 Mg Tablet

, 600 Mg Oral                 

2018 00:00:00 No                      600             Bedtime             

    [facility]

 

                          Metoprolol Tartrate 50 Mg Tablet, 50 Mg Oral Metoprolo

l Tartrate 50 Mg Tablet, 

50 Mg Oral       2018 00:00:00 No                50          Bedtime      

       [facility]

 

                          Verapamil Hcl (Verapamil Er) 240 Mg Tabsr, 240 Mg Oral

 Verapamil Hcl (Verapamil 

Er) 240 Mg Tabsr, 240 Mg Oral       2018 00:00:00 No                240   

      Bedtime             [facility]

 

                          Azithromycin (Zithromax) 500 Mg Tablet, 500 Mg Oral Az

ithromycin (Zithromax) 500

Mg Tablet, 500 Mg Oral       2013 00:00:00 No                500         T

wice A Day             [facility]

 

     Metoprolol , Metoprolol ,      2013 00:00:00 No                      

                [facility]

 

     Verapamil , Verapamil ,      2013 00:00:00 No                        

              [facility]







Immunizations





           Ordered Immunization Name Filled Immunization Name Date       Status 

    Comments   Source

 

                pneumococcal conjugate PCV 13 pneumococcal conjugate PCV 13 2018 14:05:14 

Completed                                           [facility]

 

                    influenza, seasonal, injectable influenza, seasonal, injecta

ble 2017 

13:21:16            Completed                               [facility]

ice

 

                    influenza, injectable, quadrivalent influenza, injectable, q

uadrivalent 

2015-09-10 00:00:00 Completed                               [facility]

ice

 

                    pneumococcal polysaccharide PPV23 pneumococcal polysaccharid

e PPV23 2013 

00:00:00            Completed                               [facility]

ice







Vital Signs





             Vital Name   Observation Time Observation Value Comments     Source

 

             BP Diastolic 2019 00:00:00 80 mm[Hg]                 [facility]

 

             Height       2019 00:00:00 70 [in_i]                 [facility]

 

             BMI (Body Mass Index) 2019 00:00:00 22.4 kg/m2               

 [facility]

 

             BP Systolic  2019 00:00:00 130 mm[Hg]                [facility]

 

             Body Weight  2019 00:00:00 156 [lb_av]               [facility]







Procedures





                Procedure       Date / Time Performed Performing Clinician Sourc

e

 

                INSERT TUNNELED CV CATH 2020 00:00:00 CARSON CA

[facility]

 

                X-ray of chest, two views 2020 00:00:00 CARSON CA

 [facility]

 

                EGD BIOPSY SINGLE/MULTIPLE 2020 00:00:00 YANICK AVILA

[facility]

 

                Hernia Repair                                   [facility]

ractice

 

                Mastectomy (One Breast)                                 [facility]







Plan of Care





             Planned Activity Planned Date Details      Comments     Source

 

             Instructions                                        [facility]







Encounters





             Start Date/Time End Date/Time Encounter Type Admission Type Attendi

[facility] Facility   Care Department Encounter ID    Source

 

          2020 14:32:00 2020 15:23:00 Departed Emergency Room       

              [facility]                    [id number]              [facility]

 

             2020 06:39:00 2020 06:39:00 Registered Surgical Day Car

e CARSON MARX [facility]          [id number]    [facility]

 

          2020 05:20:00 2020 05:20:00 Registered Surgical Day Care  

                   [facility]                    [id number]              [facility]

 

                    2019 00:00:00 2019 00:00:00 Jeffery Serrato MD: [address] [place] [address], Ph. [phone]                                

 [facility]   [id number]                  [facility]

 

           2018 23:39:00 2018 15:59:00 Discharged Inpatient 1       

   XANDER KUMARI 

[facility]              [id number]        [facility]

 

             2018-08-10 12:45:00 2018 19:29:00 Discharged Inpatient (obs) 

1            MICHAEL RAMIREZ         [facility]          [id number]    [facility]







Results





           Test Description Test Time  Test Comments Results    Result Comments 

Source

 

                CT CHEST W      2020 15:08:00                             

                                   

                                       [facility]        
                [address], [place] [address]      
Patient Name: ANI GUALLPA                                MR #: 
[id number]                  : 1945                                   
Age/Sex: 74/M  Acct #: [id number]                              Req #: [id number]
[id number]  Adm Physician:                                                      
Ordered by: Bob Souza MD                         Report #: [id number]  
      Location:                                       Room/Bed:               
    
________________________________________________________________________________

___________________    Procedure: [id number] CT/CT CHEST W  Exam Date: 20  
                          Exam Time: 1347                                       
       REPORT STATUS: Signed    EXAM: CT Chest WITH intravenous contrast 
2020 1:47 PM   INDICATION:     COMPARISON: X-ray dated the same day. CT 
chest dated 2018   TECHNIQUE:   Chest was scanned utilizing a multidetector
 helical scanner from the lung apex   through the level of the adrenal glands 
following administration of IV   contrast. Coronal and sagittal reformations 
were obtained. Routine protocol was   performed.      IV CONTRAST: 100mL Isovue 
370   RADIATION DOSE: Total DLP: 902 mGy*cm. Dose modulation, iterative   
reconstruction, and/or weight based adjustment of the mA/kV was utilized to   
reduce the radiation dose to as low as reasonably achievable.    COMPLICATIONS: 
None      FINDINGS:      LINES/ TUBES: None.      LUNGS AND AIRWAYS: Diffuse 
emphysematous changes are noted with hyperexpansion   of the lungs. There are 
multiple large bilateral pulmonary masses concerning   for metastatic disease.  
    Left upper lobe: The largest is noted within the left upper lobe with 
central   necrosis measuring 4.3 x 3.2 cm.   Additional large mass is identified
 within the left upper lobe along the   mediastinum measuring 4.3 x 3.4 with 
questionable mediastinal invasion.   Left lower lobe: Largest mass measures 3.2 
x 2.4 cm.    Right upper lobe: In the inferior segment of the right upper lobe 
there is a   1.6 cm mass. Within the right middle lobe: along the minor fissure 
there is a 1   cm mass.   Right lower lobe: largest mass measures approximately 
3.1 x 2.5 cm.   Negative for focal consolidation or suspicious infectious 
infiltrate.      PLEURA: Negative for pneumothorax or pleural effusion.      
HEART AND MEDIASTINUM: The thyroid gland is normal.  No mediastinal, hilar or   
axillary lymphadenopathy.  The heart is normal in size.. There is no   
pericardial effusion.  Coronary artery calcifications are noted.   Negative for 
central or segmental pulmonary embolism. Pulmonary artery is of   normal 
caliber. Thoracic aorta is of normal caliber with significant   atherosclerotic 
disease of the aortic arch. There is also significant calcified   and 
noncalcified atherosclerotic disease of the descending thoracic aorta and   
upper abdominal aorta.   At the upper aspect of the mediastinum there is a 
heterogeneous enhancing mass   centered within the esophagus. See report of the 
neck dated the same day for   further evaluation.         UPPER ABDOMEN: 
Postcholecystectomy changes. Moderate central intrahepatic and   extra hepatic 
biliary dilatation.      BONES: Osseous structures are diffusely demineralized. 
There are severe diffuse   degenerative changes of the cervical spine from C3-4 
through C7-T1. Moderate to   severe multilevel degenerative changes of the mid 
to lower thoracic and upper   lumbar spine are also noted.      SOFT TISSUES: 
Diffuse loss of the superficial soft tissue fat.      IMPRESSION:       1. 
Multiple bilateral large pulmonary nodules/masses within all lobes   concerning 
for metastatic disease. A few of the larger lesions do demonstrate   central 
necrosis. The largest lesions are within the left apex and left medial   upper 
lobe along the mediastinum.   2. Stable background of severe emphysematous 
changes. Negative for effusion,   pneumothorax or suspicious infectious 
infiltrate.   3. Severe atherosclerotic changes of the thoracic and upper 
abdominal aorta   with calcified and noncalcified adverse chronic plaque 
disease. No definite   aneurysmal dilatation is noted.   4. Negative for central
 pulmonary embolism.   5. Postcholecystectomy changes with moderate central and 
extrahepatic biliary   dilatation, likely normal for patient's age and 
postcholecystectomy state.   6. See CT neck dated the same day for details on 
the enhancing esophageal mass.      Signed by: Pranav Long MD on 2020 3:19 
PM        Dictated By: PRANAV LONG MD  Electronically Signed By: PRANAV LONG MD on 20 151  Transcribed By: TRAVIS on 20 1519       COPY TO:   
BOB SOUZA MD                                     

 

                CT SOFT TISSUE NECK W 2020 14:34:00                       

                              

                                                  [place] [address]      Patient Name: ANI GUALLPA                              
  MR #: [id number]                  : 1945                             
      Age/Sex: 74/M  Acct #: [id number]                              Req #: 
[id number]  Adm Physician:                                                      
Ordered by: Bob Souza MD                         Report #: [id number]  
      Location:                                       Room/Bed:               
    
________________________________________________________________________________

___________________    Procedure: [id number] CT/CT SOFT TISSUE NECK W  Exam Date:
 20                            Exam Time: 1347                            
                  REPORT STATUS: Signed    Examination:CT SOFT TISSUE NECK WITH 
CONTRAST      History: Dysphagia   Comparison studies: None      Technique:    
Axial images from the skull base to the thoracic inlet   Coronal and sagittal 
reformatted images.   Dose modulation, iterative reconstruction, and/or weight 
based adjustment of   the mA/kV was utilized to reduce the radiation dose to as 
low as reasonably   achievable.    Intravenous contrast: 100mL of Isovue-370.   
   Findings:      Soft tissues:   No abnormalities.      Aerodigestive tract:   
 A 6.3 x 3.2 x 2.6 cm (superoinferior x anteroposterior x transverse dimensions)
   heterogeneous enhancing soft tissue in the hypopharynx and cervical esophagus
   that occludes the esophagus.      Lymph nodes:   No radiographically 
significant adenopathy.      Vessels:    Arteries and veins are patent.      
Thyroid gland:    Normal in size and homogeneous.      Submandibular glands:   
Normal in size and homogeneous.      Parotid glands:   Bilaterally atrophic.    
  Orbits: Bilateral slitlike orbital lenses.   Paranasal sinuses: Clear.   
Temporal bones: No abnormalities.   Skull base and facial bones:  Intact.      
Cervical spine:    Heterogeneity of the cervical spine concerning for metastasis
 from C3 through   C7.   C4-C5: Severe canal stenosis due to central and right 
central disc protrusion.    C5-C6: Severe bilateral neural foraminal narrowing 
and mild canal stenosis due   to disc osteophyte complex and bilateral 
uncovertebral arthropathy.    Visualized lung apices:   Several partially 
visualized nodules are also seen in the bilateral lungs with   the largest 
measuring 3.4 x 5.5 cm (anteroposterior by transverse dimension) in   the left 
upper lobe and are all concerning for metastasis.       IMPRESSION:      
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     
  Multiple pulmonary nodules, concerning for metastases, the largest of which in
   the left upper lobe and measures 3.4 x 5.5 cm.      Lytic and sclerotic 
lesions of the cervical spine (C3-C7) are also concerning   for metastasis.     
    Signed by: Dr. Bella Gann M.D. on 2020 2:48 PM        Dictated 
By: BELLA LO MD  Electronically Signed By: BELLA GANN MD on 20 5696  Transcribed By: TRAVIS on 20 1448       COPY
 TO:   BOB SOUZA MD                                     

 

                CHEST SINGLE (PORTABLE) 2020 12:50:00                     

                              

                                                    [facility]                        [address] [address]      Patient Name: ANI GUALLPA                              
  MR #: [id number]                  : 1945                             
      Age/Sex: 74/M  Acct #: [id number]                              Req #: 
[id number]  Adm Physician:                                                      
Ordered by: Bob Souza MD                         Report #: [id number]  
      Location: ER                                      Room/Bed:               
    
________________________________________________________________________________

___________________    Procedure: [id number] DX/CHEST SINGLE (PORTABLE)  Exam 
Date: 20                            Exam Time: 1200                       
                       REPORT STATUS: Signed    TECHNIQUE: Frontal view of the 
chest.      INDICATION:     SoB    [id number]    1200      COMPARISON:  2018
      DISCUSSION:   Limited evaluation due to portable technique and patient 
rotation.      Lines and hardware: Overlying EKG leads are noted. Left chest 
wall subclavian   port is noted with tip projecting at the upper SVC.   Heart 
and mediastinum: Cardiomedial sinus silhouette is within normal limits.   
Central vascular congestion is noted.   Lungs and pleura: Negative for focal 
consolidation, large effusion or   pneumothorax. Lungs are hyperexpanded. There 
are bilateral nodular pulmonary   opacities measuring up to 3 cm and the left 
and 3 cm on the right.   Soft tissues and bones: Postsurgical changes of the 
right axilla are noted.   Negative for acute osseous abnormality. Stable right 
convex curvature of the   thoracic spine with associated degenerative changes.  
    IMPRESSION:      1. Negative for focal consolidation.   2. Lungs are 
hyperexpanded concerning for emphysematous change.   3. Bilateral multiple 
pulmonary nodules/masses measuring up to 3 cm are   concerning for metastatic 
disease. Consider cross-sectional imaging for further   evaluation.             
  Signed by: Pranav oLng MD on 2020 12:53 PM        Dictated By: PRANAV LOGN MD  Electronically Signed By: PRANAV LONG MD on 20 1253  
Transcribed By: TRAVIS on 20 1253       COPY TO:   BOB SOUZA MD 
                                                     

 

                    Influenza Virus Types A,B Antigen 2020 15:55:00   

 

                                        Test Item

 

             Influenza Virus Types A,B Antigen (test code = 57069-6) NEGATIVE   

  NEGATIVE                   





[facility] Y[facility]6435[facility] 



             Test Item    Value        Reference Range Interpretation Comments

 

             Sodium Level (test code = 2951-2) 140          136-145             

       





[facility] Zljcc9169-28-17 08:01:00* 



             Test Item    Value        Reference Range Interpretation Comments

 

             Potassium Level (test code = 2823-3) 4.7          3.5-5.1          

          





[facility]Chloride Kysmj7174-70-56 08:01:00* 



             Test Item    Value        Reference Range Interpretation Comments

 

             Chloride Level (test code = 2075-0) 104                      

         





[facility] TexasCarbon Dioxide Iobxr1538-70-45 08:01:00* 



             Test Item    Value        Reference Range Interpretation Comments

 

             Carbon Dioxide Level (test code = 2028-9) 30           22-29       

 H             





[facility] TexasAnion Oeb3762-33-60 08:01:00* 



             Test Item    Value        Reference Range Interpretation Comments

 

             Anion Gap (test code = 33037-3) 10.7         8-16                  

     





[facility] TexasBlood Urea Wsdvxjbb0696-11-71 08:01:00* 



             Test Item    Value        Reference Range Interpretation Comments

 

             Blood Urea Nitrogen (test code = 3094-0) 11           7-26         

              





[facility] TexasCreatinine2020-03-31 08:01:00* 



             Test Item    Value        Reference Range Interpretation Comments

 

             Creatinine (test code = 2160-0) 0.95         0.72-1.25             

     





[facility] TexasBUN/Creatinine Afkrj6073-95-98 08:01:00* 



             Test Item    Value        Reference Range Interpretation Comments

 

             BUN/Creatinine Ratio (test code = 3097-3) 12           6-25        

               





[facility] TexasEstimat Glomerular Filtration Rate
2020 08:01:00* 



             Test Item    Value        Reference Range Interpretation Comments

 

             Estimat Glomerular Filtration Rate (test code = 423904719) > 60    

     >60                        





Ranges were taken from the National Kidney Disease Education Program and the [facility] literature.Reference ranges:60 or greater: Hpdehe00-99 (
for 3 consecutive months): Chronic kidney disease 15 or less: Kidney failureThe Medical Center of Southeast TexasGlucose Rfbuh0092-87-25 08:01:00* 



             Test Item    Value        Reference Range Interpretation Comments

 

             Glucose Level (test code = CKZ5807) 110                      

         





[facility] TexasCalcium Sbgtt3219-06-90 08:01:00* 



             Test Item    Value        Reference Range Interpretation Comments

 

             Calcium Level (test code = 54483-2) 9.6          8.4-10.2          

         





[facility] TexasTotal Ekttavbel8950-87-94 08:01:00* 



             Test Item    Value        Reference Range Interpretation Comments

 

             Total Bilirubin (test code = 1975-2) 0.4          0.2-1.2          

          





[facility] TexasAspartate Amino Transf (AST/SGOT)
2020 08:01:00* 



             Test Item    Value        Reference Range Interpretation Comments

 

                                        Aspartate Amino Transf (AST/SGOT) (test 

code = Aspartate Amino Transf 

(AST/SGOT))     16              5-34                             





[facility] TexasAlanine Aminotransferase (ALT/SGPT)
2020 08:01:00* 



             Test Item    Value        Reference Range Interpretation Comments

 

             Alanine Aminotransferase (ALT/SGPT) (test code = 1742-6) 13        

   0-55                       





[facility] TexasTotal Ajrmevx4899-63-02 08:01:00* 



             Test Item    Value        Reference Range Interpretation Comments

 

             Total Protein (test code = 2885-2) 7.6          6.5-8.1            

        





[facility] TexasAlbumin2020-03-31 08:01:00* 



             Test Item    Value        Reference Range Interpretation Comments

 

             Albumin (test code = 1751-7) 3.1          3.5-5.0      L           

  





[facility] TexasGlobulin2020-03-31 08:01:00* 



             Test Item    Value        Reference Range Interpretation Comments

 

             Globulin (test code = 22711-4) 4.5          2.3-3.5      H         

    





[facility] TexasAlbumin/Globulin Slujs2592-93-34 08:01:00
  * 



             Test Item    Value        Reference Range Interpretation Comments

 

             Albumin/Globulin Ratio (test code = 1759-0) 0.7          0.8-2.0   

   L             





[facility] TexasAlkaline Crnhtjfeiqd0313-62-69 08:01:00* 



             Test Item    Value        Reference Range Interpretation Comments

 

             Alkaline Phosphatase (test code = 6768-6) 64                 

               





[facility] TexasCHEST 2 MDAOR5403-35-45 07:49:00         
                                                                             [facility]                        [address] [address]      Patient Name: ANI GUALLPA      
                             MR #: [id number]                     :                                    Age/Sex: 74/M  Acct #: [id number]    
                          Req #: [id number]  Adm Physician:                     
                                 Ordered by: CARSON CA MD              
              Report #: [id number]        Location: OR                           
           Room/Bed:                     _______________________________________
____________________________________________________________    Procedure: [id number]
[id number] DX/CHEST 2 VIEWS  Exam Date: 20                            Exam Time:
 729                                              REPORT STATUS: Signed    EXAM
INATION:  CHEST 2 VIEWS          INDICATION: Preop, MediPort placement     DR LAURENT COLLIER    [id number]    0729    DAY SURGERY ROOM 3 STAT      COMPARISON:  CT chest            FINDINGS:  PA and lateral views      TUBES and LINES:  None. 
     LUNGS:  Diffuse hyperinflation.  Rounded soft tissue mass in the medial lef
t   upper lobe measures 5.2 x 4.2 cm. A poorly defined soft tissue density in th
e   lower lobe on the lateral image measures 2.8 x 2.6 cm.       PLEURA:  There 
are bilateral skin folds along the lateral aspect of the chest.   No pleural eff
usions. No pneumothorax.      HEART AND MEDIASTINUM:  The cardiomediastinal silh
ouette is unremarkable..        BONES AND SOFT TISSUES: The bones are diffusely 
demineralized. Stable   dextroscoliosis of the lower thoracic spine with superim
posed endplate   degenerative changes. No focal osseous lesions.   Surgical clip
s in the right   axilla are stable.      UPPER ABDOMEN: Cholecystectomy clips in
 the right upper quadrant.      IMPRESSION:       1. Left upper lobe pulmonary m
ass concerning for neoplasm. A lower lobe opacity   visible only on lateral imag
e may also represent neoplasm.      2. Pulmonary hyperinflation suggestive of sm
all airways disease.               Signed by: Dr. Ghazala Kidd MD on 3/31/
2020 7:56 AM        Dictated By: GHAZALA KIDD MD  Electronically Signed By:
 GHAZALA KIDD MD on 20  Transcribed By: TRAVIS on 20
       COPY TO:   CARSON CA MD         White Blood Swmox0111-51-07 
07:42:00* 



             Test Item    Value        Reference Range Interpretation Comments

 

             White Blood Count (test code = 6690-2) 9.16         4.8-10.8       

            





[facility] TexasRed Blood Jwtos1048-97-01 07:42:00* 



             Test Item    Value        Reference Range Interpretation Comments

 

             Red Blood Count (test code = 789-8) 3.57         4.3-5.7      L    

         





[facility] TexasHemoglobin2020-03-31 07:42:00* 



             Test Item    Value        Reference Range Interpretation Comments

 

             Hemoglobin (test code = 67458-3) 10.8         14.0-18.0    L       

      





[facility] TexasHematocrit2020-03-31 07:42:00* 



             Test Item    Value        Reference Range Interpretation Comments

 

             Hematocrit (test code = 4544-3) 34.6         38.2-49.6    L        

     





[facility] TexasMean Corpuscular Nefnaq8942-74-29 
07:42:00* 



             Test Item    Value        Reference Range Interpretation Comments

 

             Mean Corpuscular Volume (test code = 787-2) 96.9         81-99     

                 





[facility] TexasMean Corpuscular Ldcvqwhmmj2461-27-64 
07:42:00* 



             Test Item    Value        Reference Range Interpretation Comments

 

             Mean Corpuscular Hemoglobin (test code = 785-6) 30.3         28-32 

                     





[facility] TexasMean Corpuscular Hemoglobin Concent
2020 07:42:00* 



             Test Item    Value        Reference Range Interpretation Comments

 

             Mean Corpuscular Hemoglobin Concent (test code = 786-4) 31.2       

  31-35                      





[facility] TexasRed Cell Distribution Wurgc0799-27-53 
07:42:00* 



             Test Item    Value        Reference Range Interpretation Comments

 

             Red Cell Distribution Width (test code = 41175-1) 12.5         11.7

-14.4                  





[facility] TexasPlatelet Fugqj2343-42-85 07:42:00* 



             Test Item    Value        Reference Range Interpretation Comments

 

             Platelet Count (test code = 777-3) 398          140-360      H     

        





[facility] TexasNeutrophils (%) (Auto)2020 07:42:00
  * 



             Test Item    Value        Reference Range Interpretation Comments

 

             Neutrophils (%) (Auto) (test code = 46338-4) 72.9         38.7-80.0

                  





[facility] TexasLymphocytes (%) (Auto)2020 07:42:00
  * 



             Test Item    Value        Reference Range Interpretation Comments

 

             Lymphocytes (%) (Auto) (test code = 736-9) 10.8         18.0-39.1  

  L             





[facility] TexasMonocytes (%) (Auto)2020 07:42:00* 



             Test Item    Value        Reference Range Interpretation Comments

 

             Monocytes (%) (Auto) (test code = 5905-5) 10.8         4.4-11.3    

               





[facility] TexasEosinophils (%) (Auto)2020 07:42:00
  * 



             Test Item    Value        Reference Range Interpretation Comments

 

             Eosinophils (%) (Auto) (test code = 713-8) 4.3          0.0-6.0    

                





[facility] TexasBasophils (%) (Auto)2020 07:42:00* 



             Test Item    Value        Reference Range Interpretation Comments

 

             Basophils (%) (Auto) (test code = 706-2) 0.9          0.0-1.0      

              





[facility] TexasIM GRANULOCYTES %2020 07:42:00* 



             Test Item    Value        Reference Range Interpretation Comments

 

             IM GRANULOCYTES % (test code = IM GRANULOCYTES %) 0.3          0.0-

1.0                    





[facility] TexasNeutrophils # (Auto)2020 07:42:00* 



             Test Item    Value        Reference Range Interpretation Comments

 

             Neutrophils # (Auto) (test code = 751-8) 6.7          2.1-6.9      

              





[facility] TexasLymphocytes # (Auto)2020 07:42:00* 



             Test Item    Value        Reference Range Interpretation Comments

 

             Lymphocytes # (Auto) (test code = 68902-3) 1.0          1.0-3.2    

                





[facility] TexasMonocytes # (Auto)2020 07:42:00* 



             Test Item    Value        Reference Range Interpretation Comments

 

             Monocytes # (Auto) (test code = 742-7) 1.0          0.2-0.8      H 

            





[facility] TexasEosinophils # (Auto)2020 07:42:00* 



             Test Item    Value        Reference Range Interpretation Comments

 

             Eosinophils # (Auto) (test code = 711-2) 0.4          0.0-0.4      

              





[facility] TexasBasophils # (Auto)2020 07:42:00* 



             Test Item    Value        Reference Range Interpretation Comments

 

             Basophils # (Auto) (test code = 704-7) 0.1          0.0-0.1        

            





[facility] TexasAbsolute Immature Granulocyte (auto
2020 07:42:00* 



             Test Item    Value        Reference Range Interpretation Comments

 

                                        Absolute Immature Granulocyte (auto (norma

t code = Absolute Immature Granulocyte 

(auto)          0.03            0-0.1                            





[facility] [place]- PET/CT TUMOR SK BS JEKXY7601-13-04 
09:10:00 FAX: Jeffery Izquierdo [phone]    [place] 
FAX: Marie Gonzales  [phone]   
------------------------------------------------------------------------------- 
 Name:   ANI GUALLPA JR        [facility]                     
: 1945  Age/S: 74/M           [address] [facility]                Unit #: 
[id number]      Loc: [place]  [address]              Phys: 
Marie Liz MD                                             Acct: 
[id number] Dis Date:               Status: DEP CLI                            
    PHONE #: [phone]     Exam Date:     2020     0950                
   FAX #: [phone]     Reason: LUNG CANCER                                  
      EXAMS:                                               CPT CODE:      
[id number] PET/CT TUMOR SK BS MIDTH                   [id number]                    
PROCEDURE: PET CT               INDICATION: History of breast cancer, lung 
cancer               COMPARISON: PET/CT May 23, 2014, CT of the abdomen and 
pelvis 2020, and CT of the chest 2020             
  Following the IV administration of mCi FDG, tomographic imaging was       
performed of the neck, chest, abdomen and pelvis. Blood glucose:       mg/dl. 
Images were reviewed at the workstation. CT imaging was       performed at 3 mm 
increments for comparison and attenuation correction       purposes. These imag
es do not constitute a diagnostic quality CT       examination and were not used
 to diagnose disease independently of the       PET images.                FINDI
NGS:                There is a mass in the left upper lobe that abuts the chest 
wall as       well as the aortic arch that measures up to 4.9 cm on this       e
xamination. This mass was not present on the prior PET/CT       demonstrates a m
ax SUV of 13.4. There is central decreased uptake of       FDG suggesting centra
l necrosis. This may represent recurrence of the       previous malignancy or ma
y represent a primary malignancy.               Adjacent to this mass there is a
 small spiculated lesion (3/71) which       appears to correspond to the spicula
jose lesion seen on the previous       PET/CT. No appreciable FDG uptake of this 
spiculated lesion although       this may partly be attributed to the small size
 of the lesion.               There is concentric mural thickening of the upper 
esophagus at the       level of the T2-T4 vertebral bodies. This was not present
 on the prior       CT scan either. This area is also FDG avid demonstrating nec
k SUV of       21.1. This is highly suspicious for neoplastic process and may   
    represent a primary malignancy.               There is a focus of mildly inc
reased FDG uptake in the left hilum at       the site of the previous parahilar 
mass which demonstrates max SUV of       2.5.               There are also a few
 subcentimeter lymph nodes with mild FDG uptake       with a max SUV of 2.5 that
 are located in the aortopulmonary window.               No abnormal FDG uptake 
in the abdomen or pelvis.             PAGE  1                       Signed Repor
t                    (CONTINUED)  FAX: Jeffery Izquierdo [phone]
    [place]:    St: [facility] FAX: Marie Gonzales  [phone]   -------
------------------------------------------------------------------------  Name: 
  ANI GUALLPA [facility]                     :   Age/S: 74/M           [address] [facility]                Unit #: [id number] 
     [place]  [address]              Phys: Marie Liz MD                                             Acct: [id number] Dis Date: 
              Status: DEP CLI                                PHONE #: [phone]
     Exam Date:     2020     0950                   FAX #: [phone] 
    Reason: LUNG CANCER                                        EXAMS:           
                                    CPT CODE:      [id number] PET/CT TUMOR SK BS 
MIDTH                   48097               <Continued>        Agree with the 
above findings.          ** Electronically Signed by TED Schwartz on 
2020 at 0910 **                      Reported and signed by: Berhane Schwartz M.D.                                     CC: Jeffery Serrato MD; 
Marie Liz MD                                                         
                                  Technologist: Payal Rodas RT(N)             
                       Trnscrd Date/Time/By: 2020 (0910) : By: [email or web address]  
         Orig Print D/T: S: 2020 (6184)                         PAGE  2   
                    Signed Report                               TROPONIN-I
2020 02:15:00* 



             Test Item    Value        Reference Range Interpretation Comments

 

             TROPONIN-I (test code = TROPI) <0.015 ng/mL 0-0.045      N         

    





NEXT ONE AT 2359COMMENTS TO PHLEBOTOMIST: COLLECT 3 HOURS AFTER PREVIOUS        
                   [id number] 22:10:00* 



             Test Item    Value        Reference Range Interpretation Comments

 

             TROPONIN-I (test code = TROPI) <0.015 ng/mL 0-0.045      N         

    





COMMENTS TO PHLEBOTOMIST: COLLECT 3 HOURS AFTER PREVIOUS                        
   SAMPLE- CT ABD PELVIS W/[id number] 18:40:00  Name: ANI GUALLPA JR         [facility]                     : 1945 Age/S: 74  / M   
      [address] [facility]                Unit #: [id number]     Loc:               
[place]  [address]              Phys: Anatoliy Jay MD                        
                            Acct: [id number]  Dis Date:               Status: 
ADM IN                                  PHONE #: [phone]     Exam Date: 
2020  181                     FAX #: [phone]      Reason: diffuse 
abd pain h/o cancer                         EXAMS:                              
                 CPT CODE:      [id number] CT ABD PELVIS W/CONT                  
     [address]                            REASON FOR EXAM: diffuse abd pain h/o 
cancer                EXAM ORDER DATE: 2020 12:33 PM               
Ordering: Anatoliy Jay MD       Attending:Marie Liz MD       
Location:[facility]               PROCEDURE:  - CT ABD PELVIS W/CONT               
COMPARISON:               FINDINGS: CT images of the abdomen and pelvis were o
btained with IV       and without oral contrast at 5mm. Dose modulation, iterati
ve       reconstruction, and/or weight based adjustment of the MA/KV was       u
tilized to reduce the radiation dose to as low as reasonably       achievable.  
              Intravenous contrast: 100cc of Omnipaque 370.               The li
kamilah,  spleen,  pancreas  are grossly within normal limits.        The patient is
 status post cholecystectomy               The kidneys are within normal limits.
  The urinary bladder is       unremarkable.               The colon, small sandra
l, and stomach are within normal limits without       evidence of obstruction.  
The appendix was not seen               No evidence of free air or free fluid.  
Degenerative changes and disc       disease of the lumbar spine without evidence
 of osseous metastasis.                 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
rosemary aorta with         atherosclerotic disease. No acute findings in abdomen. 
No evidence of         metastatic disease to liver.          ** Electronically S
igned by TED Barajas on 2020 at 1840 **                      Reported and
 signed by: Billy Barajas M.D.          PAGE  1                       Signed Report  
                  (CONTINUED)   [facility]                     : 1945 Age/S: 74  / M         [address]                Unit #: [id number]     Loc:               [place]  [address]
              Phys: Anatoliy Jay MD                                            
        Acct: [id number]  Dis Date:               Status: ADM IN              
                    PHONE #: [phone]     Exam Date: 2020       
              FAX #: [phone]      Reason: diffuse abd pain h/o cancer      
                   EXAMS:                                               CPT CODE
:      [id number] CT ABD PELVIS W/CONT                       [address]               
<Continued>                                       CC: Anatoliy Jay MD; 
Marie Liz MD                                                         
                                      Technologist:Jessika Bass RT(R); SHREYA MILAN  CTDI:        DLP:        Trnscb Date/Time: 2020 () t.SDR.VTL       
                 Orig Print D/T: S: 2020 ()      PAGE  2              
         Signed Report                               - CTA [id number] 
18:37:00  Name: ANI GUALLPA JR         [facility]                
     : 1945 Age/S: 74  / M         [address]                Unit
 #: [id number]     Loc:               [place]  [address]              Phys: 
Anatoliy Jay MD                                                    Acct: 
[id number]  Dis Date:               Status: ADM IN                            
      PHONE #: [phone]     Exam Date: 2020                     
FAX #: [phone]      Reason: chest pain, h/o cancner                        
     EXAMS:                                               CPT CODE:      
[id number] CTA CHEST                                  [address]                    
REASON FOR EXAM: chest pain, h/o cancner               EXAM ORDER DATE: 
2020 12:33 PM               Ordering: Anatoliy Jay MD       
Attending:Marie Liz MD       Location:[facility]                       
PROCEDURE:  - CTA CHEST                FINDINGS: CT images of the chest were 
obtained with IV contrast using       PE protocol. Reconstructed sagittal and 
coronal images including 3D       reconstructions of the chest were provided for
 interpretation.  Dose       reduction techniques were applied.               
Intravenous contrast: 100cc of Omnipaque 370.               The heart size is 
within normal limits.  No evidence of pericardial       effusion       The 
thoracic aorta is unremarkable aside from atherosclerotic disease.        No 
evidence of dissection or aneurysmal dilatation. Right axillary       lymph node
 dissection clips present.       No filling defect seen within the main or lobar
 pulmonary arteries to       suggest pulmonary embolus       No evidence of 
mediastinal or hilar adenopathy       No evidence of pleural effusion           
      IMPRESSION: Hyperinflated lungs with spiculated malignant appearing       
  mass (4 cm) in the left upper lobe abutting the aortic arch and the         
left anterior chest wall.          ** Electronically Signed by TED Barajas on 
2020 at 1837 **                      Reported and signed by: Billy Barajas M.D.
        CC: Anatoliy Jay MD; Marie Liz MD                           
                                                                    Technolo
gist:Jessika Bass RT(R); SHREYA MILAN  CTDI:        DLP:        Trnscb Date/Time: 
2020 () t.SDR.VTL                        Orig Print D/T: S: 2020
 (184)      PAGE  1                       Signed Report                        
       AG [id number] 17:16:00* 



             Test Item    Value        Reference Range Interpretation Comments

 

             AG CARCINOEMBRYONIC (test code = CEA) 3.6 ng/mL    0.0-3.0      H  

          "HEALTHY" SMOKERS CAN 

HAVE CEA VALUES UP TO 5 NG/ML.  BENIGNDISORDERS SELDOM ELEVATE THE SERUM CEA 
LEVEL ABOVE 10 NG/ML.





BASIC METABOLIC [id number] 15:04:00* 



             Test Item    Value        Reference Range Interpretation Comments

 

             SODIUM (test code = NA) 139 mmol/L   136-145      N             

 

             POTASSIUM (test code = K) 3.6 mmol/L   3.5-5.1      N             

 

             CHLORIDE (test code = CL) 104.0 mmol/L        N             

 

             CARBON DIOXIDE (test code = CO2) 29.0 mmol/L  21-32        N       

      

 

             ANION GAP (test code = GAP) 9.6          10-20        L            

 

 

             GLUCOSE (test code = GLU) 129 mg/dL           H             

 

             BLOOD UREA NITROGEN (test code = BUN) 11 mg/dL     7-18         N  

           

 

             GLOMERULAR FILTRATION RATE (test code = GFR) > 60 mL/min  >=60     

                 Estimated GFR by

 using Modified MDRD formula.Chronic kidney disease is defined as either kidney 
damageor GFR <60 mL/min/1.73 m2 for >3 months.

 

             CREATININE (test code = CREAT) 1.10 mg/dL   0.7-1.3      N         

    

 

             BUN/CREATININE RATIO (test code = BUN/CREA) 10.0         10-20     

   N             

 

             CALCIUM (test code = CA) 9.1 mg/dL    8.5-10.1     N             





HEPATIC FUNCTION [id number] 15:04:00* 



             Test Item    Value        Reference Range Interpretation Comments

 

             TOTAL PROTEIN (test code = PROT) 7.5 gram/dL  6.4-8.2      N       

      

 

             ALBUMIN (test code = ALB) 3.2 g/dL     3.4-5.0      L             

 

             GLOBULIN (test code = GLOB) 4.3 gram/dL  2.7-4.2      H            

 

 

             ALBUMIN/GLOBULIN RATIO (test code = A/G) 0.7          0.75-1.50    

L             

 

             BILIRUBIN TOTAL (test code = BILT) 0.50 mg/dL   0.0-1.0      N     

        

 

             BILIRUBIN DIRECT (test code = BILD) 0.16 mg/dL   0.0-0.20     N    

         

 

             SGOT/AST (test code = AST) 22 IUnit/L   15-37        N             

 

             SGPT/ALT (test code = ALT) 28 IUnit/L   12-78        N             

 

             ALKALINE PHOSPHATASE TOTAL (test code = ALKP) 71 IUnit/L     

     N            **Note change 

in reference range due to change in reagent.**





[id number] 15:04:00* 



             Test Item    Value        Reference Range Interpretation Comments

 

             LIPASE (test code = LIP) 82 U/L       73.0-393.0   N             





[id number] 15:04:00* 



             Test Item    Value        Reference Range Interpretation Comments

 

             TROPONIN-I (test code = TROPI) <0.015 ng/mL 0-0.045      N         

    





PROTHROMBIN [id number] 14:54:00* 



             Test Item    Value        Reference Range Interpretation Comments

 

             PROTHROMBIN TIME PATIENT (test code = PTP) 13.1 seconds 9.0-14.0   

  N             

 

             INTERNATIONAL NORMAL RATIO (test code = INR) 1.1          0.8-1.2  

    N            The therapeutic range

 for oral anticoagulant therapy formost indications is an international 
normalized ratio (INR)of between 2.0 and 3.0.  The recommended therapeutic 
INRrange for various clinical situations is listed 
below:_________________________________________________________Clinical 
Situation                          INR 
range_________________________________________________________ Pulmonary e
mbolism treatment              (2.0-3.0)Venous thrombosis treatmentVenous 
thrombosis prophylaxis (high risk surgery)Prevention of systemic embolism from: 
        Acute myocardial infarction         Valvular heart disease         
Atrial fibrillation Mechanical prosthetic heart valves          (2.5-3.5)





IS PATIENT ON ANTICOAGULANTS? NTHROMBOPLASTIN TIME [id number] 14:54:00* 



             Test Item    Value        Reference Range Interpretation Comments

 

             THROMBOPLASTIN TIME PARTIAL (test code = PTT) 31.0 seconds 25.0-36.

5    N             





IS PATIENT ON ANTICOAGULANTS? NURINALYSIS [id number] 14:54:00* 



             Test Item    Value        Reference Range Interpretation Comments

 

             UA COLOR (test code = COLU) Dark-Yellow  YELLOW                    

 

 

             UA APPEARANCE (test code = APPU) CLEAR        CLEAR                

      

 

             UA GLUCOSE DIPSTICK (test code = DGLUU) NEGATIVE mg/dL NEGATIVE    

               

 

             UA BILIRUBIN DIPSTICK (test code = BILU) 0.5 (1+) mg/dL NEGATIVE   

  A             

 

             UA KETONE DIPSTICK (test code = KETU) 10 (1+) mg/dL NEGATIVE     A 

            

 

             UA SPECIFIC GRAVITY (test code = SGU) 1.039        1.001-1.035     

           

 

             UA BLOOD DIPSTICK (test code = LESA) Negative mg/dL NEGATIVE        

           

 

             UA PH DIPSTICK (test code = NELLY) 6.0          5.0-8.0              

      

 

             UA PROTEIN DIPSTICK (test code = PROU) 50 (1+) mg/dL NEGATIVE     A

             

 

             UA UROBILINIOGEN DIPSTICK (test code = URO) 4.0 (2+) mg/dL NEGATIVE

     A             

 

             UA NITRITE DIPSTICK (test code = BREN) NEGATIVE     NEGATIVE       

            

 

                    UA LEUKOCYTE ESTERASE W REFLEX (test code = LEUUR) 25 Mohini/uL

 (Trace) Mohini/uL 

NEGATIVE                  A                          

 

             UA WBC (test code = WBCU) 0-5 per HPF  0-5                        

 

             UA RBC (test code = RBCU) 3-5 #/HPF    0-5                        

 

             UA EPITHELIAL CELLS (test code = EPIU) FEW per HPF  FEW            

            

 

             UA BACTERIA (test code = BACU) FEW #/HPF    NONE         A         

    

 

             UA HYALINE CAST (test code = HYALU) 11-20 #/LPF  0-5          A    

         

 

             UA MUCUS (test code = MUCU) MANY #/LPF   FEW          A            

 





Urine Source? Clean CatchURINALYSIS [id number] 14:53:00* 



             Test Item    Value        Reference Range Interpretation Comments

 

             UA COLOR (test code = COLU) Dark-Yellow  YELLOW                    

 

 

             UA APPEARANCE (test code = APPU) CLEAR        CLEAR                

      

 

             UA GLUCOSE DIPSTICK (test code = DGLUU) NEGATIVE mg/dL NEGATIVE    

               

 

             UA BILIRUBIN DIPSTICK (test code = BILU) 0.5 (1+) mg/dL NEGATIVE   

  A             

 

             UA KETONE DIPSTICK (test code = KETU) 10 (1+) mg/dL NEGATIVE     A 

            

 

             UA SPECIFIC GRAVITY (test code = SGU) 1.039        1.001-1.035     

           

 

             UA BLOOD DIPSTICK (test code = LESA) Negative mg/dL NEGATIVE        

           

 

             UA PH DIPSTICK (test code = NELLY) 6.0          5.0-8.0              

      

 

             UA PROTEIN DIPSTICK (test code = PROU) 50 (1+) mg/dL NEGATIVE     A

             

 

             UA UROBILINIOGEN DIPSTICK (test code = URO) 4.0 (2+) mg/dL NEGATIVE

     A             

 

             UA NITRITE DIPSTICK (test code = BREN) NEGATIVE     NEGATIVE       

            

 

                    UA LEUKOCYTE ESTERASE W REFLEX (test code = LEUUR) 25 Mohini/uL

 (Trace) Mohini/uL 

NEGATIVE                  A                          

 

             UA WBC (test code = WBCU)  per HPF     0-5                        

 

             UA RBC (test code = RBCU)  per HPF     0-5                        

 

             UA EPITHELIAL CELLS (test code = EPIU)  per HPF     Few            

            

 

             UA BACTERIA (test code = BACU)  per HPF     NONE                   

    





Urine Source? Clean CatchCBC W/O [id number] 14:45:00* 



             Test Item    Value        Reference Range Interpretation Comments

 

             WHITE BLOOD CELL (test code = WBC) 8.0 K/mm3    4.5-12.5     N     

        

 

             RED BLOOD CELL (test code = RBC) 3.84 mill/mm3 4.0-5.8      L      

       

 

             HEMOGLOBIN (test code = HGB) 11.8 gram/dL 13.0-17.5    L           

  

 

             HEMATOCRIT (test code = HCT) 36.9 %       42.0-52.0    L           

  

 

             MEAN CELL VOLUME (test code = MCV) 96.1 fL      80-98        N     

        

 

             MEAN CELL HGB (test code = MCH) 30.7 picogram 27.0-33.0    N       

      

 

             MEAN CELL HGB CONCETRATION (test code = MCHC) 32.0 gram/dL 33.0-36.

0    L             

 

             RED CELL DISTRIBUTION WIDTH (test code = RDW) 12.9 %       11.6-16.

2    N             

 

             PLATELET COUNT (test code = PLT) 289 K/mm3    150-450      N       

      

 

             MEAN PLATELET VOLUME (test code = MPV) 10.1 fL      6.7-11.0     N 

            





BASIC METABOLIC [id number] 14:44:00* 



             Test Item    Value        Reference Range Interpretation Comments

 

             SODIUM (test code = NA) 139 mmol/L   136-145      N             

 

             POTASSIUM (test code = K) 3.6 mmol/L   3.5-5.1      N             

 

             CHLORIDE (test code = CL) 104.0 mmol/L        N             

 

             CARBON DIOXIDE (test code = CO2)  mmol/L      21-32                

      

 

             ANION GAP (test code = GAP)              10-20                     

 

 

             GLUCOSE (test code = GLU)  mg/dL                            

 

             BLOOD UREA NITROGEN (test code = BUN)  mg/dL       7-18            

           

 

             GLOMERULAR FILTRATION RATE (test code = GFR)  mL/min      >=60     

                  

 

             CREATININE (test code = CREAT)  mg/dL       0.7-1.3                

    

 

             BUN/CREATININE RATIO (test code = BUN/CREA)              10-20     

                 

 

             CALCIUM (test code = CA)  mg/dL       8.5-10.1                   





HEPATIC FUNCTION [id number] 14:44:00* 



             Test Item    Value        Reference Range Interpretation Comments

 

             TOTAL PROTEIN (test code = PROT)  gram/dL     6.4-8.2              

      

 

             ALBUMIN (test code = ALB)  g/dL        3.4-5.0                    

 

             GLOBULIN (test code = GLOB)  gram/dL     2.7-4.2                   

 

 

             ALBUMIN/GLOBULIN RATIO (test code = A/G)              0.75-1.50    

              

 

             BILIRUBIN TOTAL (test code = BILT)  mg/dL       0.0-1.0            

        

 

             BILIRUBIN DIRECT (test code = BILD)  mg/dL       0.0-0.20          

         

 

             SGOT/AST (test code = AST)  IUnit/L     15-37                      

 

             SGPT/ALT (test code = ALT)  IUnit/L     12-78                      

 

             ALKALINE PHOSPHATASE TOTAL (test code = ALKP)  IUnit/L       

                   





[id number] 14:44:00* 



             Test Item    Value        Reference Range Interpretation Comments

 

             LIPASE (test code = LIP)  U/L         73.0-393.0                 





[id number] 14:44:00* 



             Test Item    Value        Reference Range Interpretation Comments

 

             TROPONIN-I (test code = TROPI)  ng/mL       0-0.045                

    





- XR CHEST 1 -09-57 13:23:00 FAX:         Anatoliy Jay MD     [phone]
    [place]:    St: [facility] FAX: Marie Gonzales  [phone]   
------------------------------------------------------------------------------- 
 Name:   ANI GUALLPA [facility]                     
: 1945  Age/S: 74/M           [address] [facility]                Unit #: 
[id number]      Loc: [place]  [address]              Phys: 
Anatoliy Jay MD                                                    Acct: 
[id number] Dis Date:               Status: REG ER                             
    PHONE #: [phone]     Exam Date:     2020     1240                
   FAX #: [phone]     Reason: cougyh                                       
      EXAMS:                                               CPT CODE:      
[id number] XR CHEST 1 V                               [id number]                      
      REASON FOR EXAM: cougyh               Exam Order Date: 2020 12:23 PM 
              Ordering M.D.: Anatoliy Jay MD               PROCEDURE:  - XR 
CHEST 1 V               COMPARISON: 2 view chest x-ray 2019            
   FINDINGS:         The lungs are clear.  There is no pleural effusion or 
pneumothorax.       Pulmonary vascularity is within normal limits.              
 Cardiomediastinal silhouette is normal in size for technique. The       
mediastinal contours are within normal limits. Atherosclerotic disease       is 
present in the aortic arch.               Changes of prior right-sided mastec
pamela and right-sided axillary lymph       node dissection are redemonstrated. Th
ere are degenerative changes of       the spine.               The visualized up
per abdomen is within normal limits.                         IMPRESSION:        
 No acute cardiopulmonary process.                   Location: [facility]          ** E
lectronically Signed by Patrice Mercer MD on 2020 at 1323 **                 
     Reported and signed by: Patrice Mercer MD         CC: Anatoliy Jay MD; Marie Patel MD                                                               
                                Technologist: SILVINO GRUBBS JR                   
                     Trnscrd Date/Time/By: 2020 (4020) : By: [email or web address]   
       Orig Print D/T: S: 2020 (9679)                         PAGE  1     
                  Signed Report                               - XR CHEST 2 V
2019 14:18:00 FAX: Marie Gonzales  [phone]    [place]: O 
  St: REG----------
---------------------------------------------------------------------  Name:   ANI RIVERA JR        [facility]                     : [birth year]  Age/S: 73/M           [address] [facility]                Unit #: [id number]    
  Loc: [place]  [address]              Phys: Marie Liz MD                                             Acct: [id number] Dis Date:    
           Status: REG CLI                                PHONE #: [phone] 
    Exam Date:     2019     1410                   FAX #: [phone]    
 Reason: N34.12                                             EXAMS:              
                                 CPT CODE:      [id number] XR CHEST 2 V          
                     [address]                            REASON FOR EXAM: N34.12   
            Exam Order Date: 2019 1:57 PM               Ordering M.D.: Oxana Liz MD               PROCEDURE:  - XR CHEST 2 V               ROSANNE
RISON: CT of the chest 2016               FINDINGS:         The sp
iculated lesion in the left upper lobe seen on the previous CT       scan is not
 clearly visualized on this exam. There are a few scattered       areas of subse
gmental atelectasis versus scarring in the left upper       lung.       Surgical
 clips project over the right axilla. Additionally there is       increased luce
ncy of the right hemithorax compared to the left, likely       secondary to the 
decreased amount of overlying soft tissues, better       appreciated on the prev
ious CT scan.               No acute airspace disease.               Cardiac med
iastinal silhouette is normal in size.               There is dextroscoliosis ce
ntered at the lower thoracic spine.       Visualized upper abdomen is within nor
mal limits.                         IMPRESSION: No acute cardiopulmonary process
.         Increased lucency of the right hemithorax compared to the left is     
    likely due to the decreased amount of overlying soft tissue in the         a
nterior right chest wall.          ** Electronically Signed by Patrice Mercer MD on
 2019 at 1418 **                      Reported and signed by: Patrice Mercer MD       CC: Marie Liz MD                                            
                                                                     Technologis
t: BAYLEE HUMPHREY RT (R)                          Trnscrd Date/Time/By: 
2019 (1417) : By: [email or web address]          Orig Print D/T: S: 2019 (2534)
                         PAGE  1                       Signed Report            
                   Sodium Ykchp7187-00-51 08:50:00* 



             Test Item    Value        Reference Range Interpretation Comments

 

             Sodium Level (test code = 2951-2) 138          136-145             

       





[facility]Potassium Zyaav5317-80-75 08:50:00* 



             Test Item    Value        Reference Range Interpretation Comments

 

             Potassium Level (test code = 2823-3) 4.2          3.5-5.1          

          





[facility] TexasChloride Vshcf6524-90-77 08:50:00* 



             Test Item    Value        Reference Range Interpretation Comments

 

             Chloride Level (test code = 2075-0) 106                      

         





[facility] TexasCarbon Dioxide Zyrli0377-48-38 08:50:00* 



             Test Item    Value        Reference Range Interpretation Comments

 

             Carbon Dioxide Level (test code = 2028-9) 23           22-29       

               





[facility] TexasAnion Ddr5047-80-08 08:50:00* 



             Test Item    Value        Reference Range Interpretation Comments

 

             Anion Gap (test code = 33037-3) 13.2         8-16                  

     





[facility] TexasBlood Urea Dumhprak3896-19-88 08:50:00* 



             Test Item    Value        Reference Range Interpretation Comments

 

             Blood Urea Nitrogen (test code = 3094-0) 15           7-26         

              





[facility] TexasCreatinine2018-12-23 08:50:00* 



             Test Item    Value        Reference Range Interpretation Comments

 

             Creatinine (test code = 2160-0) 0.82         0.72-1.25             

     





[facility] TexasBUN/Creatinine Jtgcu8636-32-25 08:50:00* 



             Test Item    Value        Reference Range Interpretation Comments

 

             BUN/Creatinine Ratio (test code = 3097-3) 18           6-25        

               





[facility] TexasEstimat Glomerular Filtration Rate
2018 08:50:00* 



             Test Item    Value        Reference Range Interpretation Comments

 

             Estimat Glomerular Filtration Rate (test code = 718823858) > 60    

     >60                        





Ranges were taken from the National Kidney Disease Education Program and the [facility] literature.Reference ranges:60 or greater: Dzaioo73-88 (
for 3 consecutive months): Chronic kidney disease 15 or less: Kidney failureThe Medical Center of Southeast TexasGlucose Ngxkv7647-68-33 08:50:00* 



             Test Item    Value        Reference Range Interpretation Comments

 

             Glucose Level (test code = CFP3404) 114                      

         





[facility] TexasCalcium Uwipp4532-07-52 08:50:00* 



             Test Item    Value        Reference Range Interpretation Comments

 

             Calcium Level (test code = 59359-1) 8.8          8.4-10.2          

         





[facility] TexasTotal Azmqlaadq4529-87-31 08:50:00* 



             Test Item    Value        Reference Range Interpretation Comments

 

             Total Bilirubin (test code = 1975-2) 1.4          0.2-1.2      H   

          





[facility] TexasAspartate Amino Transf (AST/SGOT)
2018 08:50:00* 



             Test Item    Value        Reference Range Interpretation Comments

 

                                        Aspartate Amino Transf (AST/SGOT) (test 

code = Aspartate Amino Transf 

(AST/SGOT))     65              5-34            H                





[facility] TexasAlanine Aminotransferase (ALT/SGPT)
2018 08:50:00* 



             Test Item    Value        Reference Range Interpretation Comments

 

             Alanine Aminotransferase (ALT/SGPT) (test code = 1742-6) 94        

   0-55         H             





[facility] TexasTotal Jpxwbqg2049-70-09 08:50:00* 



             Test Item    Value        Reference Range Interpretation Comments

 

             Total Protein (test code = 2885-2) 6.4          6.5-8.1      L     

        





[facility] TexasAlbumin2018-12-23 08:50:00* 



             Test Item    Value        Reference Range Interpretation Comments

 

             Albumin (test code = 1751-7) 3.1          3.5-5.0      L           

  





[facility] TexasGlobulin2018-12-23 08:50:00* 



             Test Item    Value        Reference Range Interpretation Comments

 

             Globulin (test code = 78441-1) 3.3          2.3-3.5                

    





[facility] TexasAlbumin/Globulin Fmikq5998-63-58 08:50:00
  * 



             Test Item    Value        Reference Range Interpretation Comments

 

             Albumin/Globulin Ratio (test code = 1759-0) 0.9          0.8-2.0   

                 





[facility] TexasAlkaline Rucjihoauem8849-03-89 08:50:00* 



             Test Item    Value        Reference Range Interpretation Comments

 

             Alkaline Phosphatase (test code = 6768-6) 53                 

               





[facility] TexasWhite Blood Pagdz4878-07-63 07:01:00* 



             Test Item    Value        Reference Range Interpretation Comments

 

             White Blood Count (test code = 6690-2) 16.03        4.8-10.8     H 

            





REVIEWEDThe [facility] Center of Southeast TexasRed Blood Vvgri0904-85-29 
07:01:00* 



             Test Item    Value        Reference Range Interpretation Comments

 

             Red Blood Count (test code = 789-8) 3.82         4.3-5.7      L    

         





[facility] TexasHemoglobin2018-12-23 07:01:00* 



             Test Item    Value        Reference Range Interpretation Comments

 

             Hemoglobin (test code = 96149-6) 12.1         14.0-18.0    L       

      





[facility] TexasHematocrit2018-12-23 07:01:00* 



             Test Item    Value        Reference Range Interpretation Comments

 

             Hematocrit (test code = 4544-3) 38.9         38.2-49.6             

     





[facility] TexasMean Corpuscular Gnolaf6407-75-49 
07:01:00* 



             Test Item    Value        Reference Range Interpretation Comments

 

             Mean Corpuscular Volume (test code = 787-2) 101.8        81-99     

   H             





[facility] TexasMean Corpuscular Gejdydgteh6157-43-20 
07:01:00* 



             Test Item    Value        Reference Range Interpretation Comments

 

             Mean Corpuscular Hemoglobin (test code = 785-6) 31.7         28-32 

                     





[facility] TexasMean Corpuscular Hemoglobin Concent
2018 07:01:00* 



             Test Item    Value        Reference Range Interpretation Comments

 

             Mean Corpuscular Hemoglobin Concent (test code = 786-4) 31.1       

  31-35                      





[facility] TexasRed Cell Distribution Mknpn1758-98-77 
07:01:00* 



             Test Item    Value        Reference Range Interpretation Comments

 

             Red Cell Distribution Width (test code = 56929-1) 13.2         11.7

-14.4                  





[facility] TexasPlatelet Oulrh0334-16-42 07:01:00* 



             Test Item    Value        Reference Range Interpretation Comments

 

             Platelet Count (test code = 777-3) 180          140-360            

        





[facility] TexasNeutrophils (%) (Auto)2018 07:01:00
  * 



             Test Item    Value        Reference Range Interpretation Comments

 

             Neutrophils (%) (Auto) (test code = 58753-8) 87.4         38.7-80.0

    H             





[facility] TexasLymphocytes (%) (Auto)2018 07:01:00
  * 



             Test Item    Value        Reference Range Interpretation Comments

 

             Lymphocytes (%) (Auto) (test code = 736-9) 4.8          18.0-39.1  

  L             





[facility] TexasMonocytes (%) (Auto)2018 07:01:00* 



             Test Item    Value        Reference Range Interpretation Comments

 

             Monocytes (%) (Auto) (test code = 5905-5) 7.2          4.4-11.3    

               





[facility] TexasEosinophils (%) (Auto)2018 07:01:00
  * 



             Test Item    Value        Reference Range Interpretation Comments

 

             Eosinophils (%) (Auto) (test code = 713-8) 0.0          0.0-6.0    

                





[facility] TexasBasophils (%) (Auto)2018 07:01:00* 



             Test Item    Value        Reference Range Interpretation Comments

 

             Basophils (%) (Auto) (test code = 706-2) 0.1          0.0-1.0      

              





[facility] TexasIM GRANULOCYTES %2018 07:01:00* 



             Test Item    Value        Reference Range Interpretation Comments

 

             IM GRANULOCYTES % (test code = IM GRANULOCYTES %) 0.5          0.0-

1.0                    





[facility] TexasNeutrophils # (Auto)2018 07:01:00* 



             Test Item    Value        Reference Range Interpretation Comments

 

             Neutrophils # (Auto) (test code = 751-8) 14.0         2.1-6.9      

H             





[facility] TexasLymphocytes # (Auto)2018 07:01:00* 



             Test Item    Value        Reference Range Interpretation Comments

 

             Lymphocytes # (Auto) (test code = 92241-0) 0.8          1.0-3.2    

  L             





[facility] TexasMonocytes # (Auto)2018 07:01:00* 



             Test Item    Value        Reference Range Interpretation Comments

 

             Monocytes # (Auto) (test code = 742-7) 1.2          0.2-0.8      H 

            





[facility] TexasEosinophils # (Auto)2018 07:01:00* 



             Test Item    Value        Reference Range Interpretation Comments

 

             Eosinophils # (Auto) (test code = 711-2) 0.0          0.0-0.4      

              





[facility] TexasBasophils # (Auto)2018 07:01:00* 



             Test Item    Value        Reference Range Interpretation Comments

 

             Basophils # (Auto) (test code = 704-7) 0.0          0.0-0.1        

            





[facility] TexasAbsolute Immature Granulocyte (auto
2018 07:01:00* 



             Test Item    Value        Reference Range Interpretation Comments

 

                                        Absolute Immature Granulocyte (auto (norma

t code = Absolute Immature Granulocyte 

(auto)          0.08            0-0.1                            





[facility] TexasABDOMEN 2 [id number] 06:31:00        
                                                                              [place] [address]      Patient Name: ANI GUALLPA      
                             MR #: [id number]                     :                                    Age/Sex: 73/M  Acct #: [id number]    
                          Req #: [id number]  Adm Physician: XANDER KUMARI MD    
                                  Ordered by: DEREK CA MD            
                Report #: [id number]        Location: MED/SURG                   
             Room/Bed: [facility]               _____________________________________
______________________________________________________________    Procedure: 122
0-0001 DX/ABDOMEN 2 VIEW  Exam Date: 18                            Exam Ti
me: 0615                                              REPORT STATUS: Signed    A
BDOMEN 2 VIEW      Clinical history: Small bowel obstruction   Technique: AP vie
w abdomen, supine and upright   Comparison: Recent CT, previous day x-ray      F
indings:   Stable NG tube position within the expected stomach. No dilated gas-f
illed   loops of small bowel. Moderate colonic stool burden. No free air. Vascul
ar   calcifications and scoliotic curvature of the spine noted.      Impression:
   No radiographic findings of small bowel obstruction.       Signed by: Dr Aaron Frausto MD on 2018 6:32 AM        Dictated By: ROBERTH FRAUSTO MD  Elec
tronically Signed By: ROBERTH FRAUSTO MD on 18  Transcribed By: MELISSA JACQUES on 18       COPY TO:   DEREK CA MD        HEPTOBILIARY W
 [id number] 15:23:00                                                       
                               [place] [address]      Patient 
Name: ANI GUALLPA                                   MR #: [id number]    
                 : 1945                                   Age/Sex: 73/M
  Acct #: [id number]                              Req #: [id number]  Adm 
Physician: XANDER KUMARI MD                                      Ordered by: 
MARIE LIZ MD                            Report #: [id number]        
Location: MED/SURG                                Room/Bed: 103-1               
______________________________________
_____________________________________________________________    Procedure: 1219
-0003 NM/HEPTOBILIARY W PHARM  Exam Date:                             Exam Time:
                                               REPORT STATUS: Signed    Hepatobi
liary Scan with Gallbladder Ejection Fraction      Clinical information: 73 M wi
th SBO, likely resolved, and new onset abdominal   pain      Technique: Followin
g intravenous administration of 6.7 millicuries of Tc-99m   mebrofenin, dynamic 
images of the abdomen in the anterior projection were   obtained through 50 vilma
norma.  Sincalide (CCK analog) 1.6 micrograms was   administered intravenously ove
r 30 minutes with additional imaging for   determination of gallbladder ejection
 fraction.      Discussion: Perfusion of the liver is normal.  Extraction of tra
cer by the   liver parenchyma is normal.  Tracer appears promptly within the moon
iary tract.    The gallbladder begins to fill at 18 minutes post injection of tr
acer and fills   adequately.  Tracer is seen in the small bowel by 35 minutes.  
There is no   contractile response by the gallbladder to the pharmacologic dose 
of sincalide.    No emptying of the gallbladder occurs during the 30 minute infu
phuong.        Impression:       1.  Filling of the gallbladder excludes acute cys
tic duct obstruction/acute   cholecystitis.      2.  The gallbladder ejection fr
action is undefined as there is no emptying of   the gallbladder during the infu
phuong of sincalide.  This absence of a   contractile response to sincalide suppor
ts the clinical diagnosis of chronic   cholecystitis/gallbladder dyskinesia.    
  Signed by: Dr. Alessia Dupont M.D. on 2018 3:25 PM        Dictated By: MALGORZATA UDPONT MD  Electronically Signed By: ALESSIA DUPONT MD on 18 1524  Transcri
bed By: TRAVIS on 18 1520       COPY TO:   MARIE LIZ MD        
Prothrombin Xsfx8904-70-03 06:04:00* 



             Test Item    Value        Reference Range Interpretation Comments

 

             Prothrombin Time (test code = 5902-2) 13.5         11.9-14.5       

           





[facility] Patients Medical CenterProthromb Time International Ratio
2018 06:04:00* 



             Test Item    Value        Reference Range Interpretation Comments

 

             Prothromb Time International Ratio (test code = 6301-6) 0.95       

                             





Oral Anticoagulant Therapy INR Values:1. Low Intensity Therapy        1.5 - 2.02
. Moderate Intensity Therapy   2.0 - 3.03. High Intensity Therapy(1)    2.5 - 3.
54. High Intensity Therapy(2)    3.0 - 4.05. Panic Value INR              > 5.0
[facility] TexasActivated Partial Thromboplast Time
2018 06:04:00* 



             Test Item    Value        Reference Range Interpretation Comments

 

             Activated Partial Thromboplast Time (test code = 64358-4) 27.3     

    23.8-35.5                  





[facility] TexasABDOMEN 2 [id number] 05:29:00        
                                                                              [place] [address]      Patient Name: ANI GUALLPA      
                             MR #: [id number]                     :                                    Age/Sex: 73/M  Acct #: [id number]    
                          Req #: [id number]  Adm Physician: XANDER KUMARI MD    
                                  Ordered by: VALERIE MIRANDA MD          
                  Report #: [id number]        Location: MED/SURG                 
               Room/Bed: [facility]               ___________________________________
________________________________________________________________    Procedure: 1
219-0008 DX/ABDOMEN 2 VIEW  Exam Date: 18                            Exam 
Time: 0450                                              REPORT STATUS: Signed   
 ABDOMEN 2 VIEW      Clinical history:  FALT AND UPRIGHT PT WITH SBO  [id number]  
0450  Y   Technique: AP view abdomen, supine and upright   Comparison: Recent CT
      Findings:   NG tube position within the expected stomach. No dilated gas-f
illed loops of   small bowel. Moderate colonic stool burden. No free air. Vascul
ar   calcifications and scoliotic curvature of the spine noted.      Impression:
   No radiographic findings of small bowel obstruction.       Signed by: Dr Aaron Frausto MD on 2018 5:31 AM        Dictated By: ROBERTH FRAUSTO MD  Elec
tronically Signed By: ROBERTH FRAUSTO MD on 18  Transcribed By: MELISSA JACQUES on 18       COPY TO:   VALERIE MIRANDA MD        Urine WBC
2018 01:16:00* 



             Test Item    Value        Reference Range Interpretation Comments

 

             Urine WBC (test code = 5821-4) 11-20        0-5          H         

    





[facility] TexasUrine [id number] 01:16:00* 



             Test Item    Value        Reference Range Interpretation Comments

 

             Urine RBC (test code = 38920-3) 0-5          0-5                   

     





[facility] TexasUrine Tocohwxi7098-31-68 01:16:00* 



             Test Item    Value        Reference Range Interpretation Comments

 

             Urine Bacteria (test code = 62641-3) RARE         NONE             

          





[facility] TexasUrine Epithelial Waxod8900-86-18 01:16:00
  * 



             Test Item    Value        Reference Range Interpretation Comments

 

             Urine Epithelial Cells (test code = 22812-4) RARE         NONE     

                  





[facility] TexasUrine Vysul2202-92-68 01:05:00* 



             Test Item    Value        Reference Range Interpretation Comments

 

             Urine Color (test code = 5778-6) YELLOW       YELLOW               

      





[facility] TexasUrine Ibjpczz2775-54-42 01:05:00* 



             Test Item    Value        Reference Range Interpretation Comments

 

             Urine Clarity (test code = 15917-0) CLEAR        CLEAR             

         





[facility] TexasUrine Specific Neygvtd7405-02-99 01:05:00
  * 



             Test Item    Value        Reference Range Interpretation Comments

 

             Urine Specific Gravity (test code = 5811-5) 1.010        1.010-1.02

5                





[facility] TexasUrine iN0121-25-29 01:05:00* 



             Test Item    Value        Reference Range Interpretation Comments

 

             Urine pH (test code = 13967-6) 7            5-7                    

    





[facility] TexasUrine Leukocyte Hywhyxqo6882-81-03 
01:05:00* 



             Test Item    Value        Reference Range Interpretation Comments

 

             Urine Leukocyte Esterase (test code = 5799-2) NEGATIVE     NEGATIVE

                   





[facility] TexasUrine Dktibwu3000-50-69 01:05:00* 



             Test Item    Value        Reference Range Interpretation Comments

 

             Urine Nitrite (test code = 39304-8) NEGATIVE     NEGATIVE          

         





[facility] TexasUrine Tvbdmmo9010-24-50 01:05:00* 



             Test Item    Value        Reference Range Interpretation Comments

 

             Urine Protein (test code = 5804-0) NEGATIVE     NEGATIVE           

        





[facility] TexasUrine Glucose (UA)2018 01:05:00* 



             Test Item    Value        Reference Range Interpretation Comments

 

             Urine Glucose (UA) (test code = 2349-9) NEGATIVE     NEGATIVE      

             





[facility] TexasUrine Pqyaaob9675-37-62 01:05:00* 



             Test Item    Value        Reference Range Interpretation Comments

 

             Urine Ketones (test code = 92288-8) NEGATIVE     NEGATIVE          

         





[facility] TexasUrine Xnmylmyqkram8526-08-93 01:05:00* 



             Test Item    Value        Reference Range Interpretation Comments

 

             Urine Urobilinogen (test code = 38277-6) 0.2          0.2-1        

              





[facility] TexasUrine Ywqzmmjcd4659-54-84 01:05:00* 



             Test Item    Value        Reference Range Interpretation Comments

 

             Urine Bilirubin (test code = 1978-6) NEGATIVE     NEGATIVE         

          





[facility] TexasUrine Suvno7890-60-76 01:05:00* 



             Test Item    Value        Reference Range Interpretation Comments

 

             Urine Blood (test code = 99429-0) NEGATIVE     NEGATIVE            

       





[facility] TexasCT ABDOMEN/PELVIS -56-88 22:34:00   
                                                                                
   [place] [address]      Patient Name: ANI GUALLPA 
                                  MR #: [id number]                     :                                    Age/Sex: 73/M  Acct #: [id number]    
                          Req #: [id number]  Adm Physician:                     
                                 Ordered by: VALERIE MIRANDA MD           
                 Report #: [id number]        Location: ER                        
              Room/Bed:                     ____________________________________
_______________________________________________________________    Procedure:  CT/CT ABDOMEN/PELVIS W  Exam Date: 18                            E
xam Time: 2200                                              REPORT STATUS: Candida
d    EXAM: CT ABDOMEN/PELVIS W   DATE: 2018 9:00 PM     INDICATION:  Abdom
inal pain   COMPARISON:  None    TECHNIQUE: The abdomen and pelvis were scanned 
using a multidetector helical   scanner. Coronal and sagittal reformations were 
obtained.  CT low dose   techniques were utilized, as applicable.   IV Contrast:
  100 ml Isovue 300/370      FINDINGS:   LOWER THORAX: Left basilar atelectasis/
scar. Several small pulmonary nodules,   stable from recent chest CT for example
 5 mm in the right lower lobe      LIVER/BILIARY:  No masses.  No ductal dilatat
ion.      GALLBLADDER: Mildly distended gallbladder containing a 1.9 cm stone.  
 SPLEEN: Unremarkable   PANCREAS: Unremarkable      ADRENALS: No nodules   KIDNE
YS: No suspicious renal masses.  No hydronephrosis.        GI TRACT: Dilated flu
id-filled small bowel loops in the central abdomen and   right lower quadrant wi
th transition point as the bowel extends anterior and   superior to the liver (C
hilaiditi positioning, image 37, 36).      VESSELS: Severe atherosclerotic becerril
e with multiple areas of adherent neural   thrombus/noncalcified plaque. Aneurys
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 
respectively   PERITONEUM/RETROPERITONEUM: No free air. Mild free fluid and mese
nteric edema.   LYMPH NODES: No lymphadenopathy      REPRODUCTIVE ORGANS/BLADDER
: Unremarkable      SOFT TISSUES: Fat-containing ventral hernia with minimal str
anding.   BONES: Multilevel degenerative changes and curvature of the spine.    
  IMPRESSION:   Small bowel obstruction, with transition point adjacent to the l
iver   (Chilaiditi's).      Signed by: Dr Roberth Frausto MD on 2018 10:49 
PM        Dictated By: ROBERTH FRAUSTO MD  Electronically Signed By: ROBERTH RIOS MD on 18  Transcribed By: TRAVIS on 18       COPY TO
:   VALERIE MIRANDA MD        Amylase Zowxn9965-94-35 21:38:00* 



             Test Item    Value        Reference Range Interpretation Comments

 

             Amylase Level (test code = 1798-8) 108                       

        





[facility]Lipase2018-[facility]-18 21:38:00* 



             Test Item    Value        Reference Range Interpretation Comments

 

             Lipase (test code = 3040-3) 15           8-78                      

 





[facility] -51-83 09:20:00    [facility]   [address] [address]      Patient Name: ANI GUALLPA   MR #: [id number]    : 
1945 Age/Sex: 72/M  Acct #: [id number] Req #: [id number]  Adm Physician:
 SHARYN CRUZ MD, [facility]    Ordered by: BECKY RAMIREZ MD  Report #: [id number]   
Location: MED/SURG  Room[facility]Bed: [facility]    _____________________________________
______________________________________________________________    Procedure: 081
2-0002 CT/CT CHEST W  Exam Date: 18                            Exam Time: 
0530       REPORT STATUS: Signed    EXAM: CT Chest WITH contrast 2018 6:00 
AM   INDICATION:                  COMPARISON: Chest x-ray on 8/10/2018, chest CT
 dated 2013      TECHNIQUE:   Chest was scanned utilizing a multidetector 
helical scanner from the lung apex   through the level of the adrenal glands wit
hout administration of IV contrast.   Coronal and sagittal reformations were obt
ained. PE protocol was performed.        IV CONTRAST: 100 mL of Isovue-370      
COMPLICATIONS: None      RADIATION DOSE:         Total DLP: 625.55 mGy*cm       
 Estimated effective dose: (DLP x 0.014 x size factor) mSv        CTDIvol has be
en reviewed. It is below the limits set by the Radiation   Protocol Committee (R
PC).      FINDINGS:      LINES/ TUBES: None.      LUNGS AND AIRWAYS:  No evidenc
e of pulmonary embolism to the segmental level. 7   mm left upper lobe nodule (s
eries 3, image 28). Spiculated 8 mm left upper lobe   nodule (3/37). 5 mm right 
middle lobe nodule (3/83). 3 mm subpleural right   upper lobe nodule (3/46). Dep
endent atelectasis. Airways are normal.      PLEURA: The pleural spaces are von
r.      HEART AND MEDIASTINUM: The thyroid gland is normal.  No mediastinal or h
ilar   lymphadenopathy. Enlarged right axillary and subpectoralis lymph nodes,  
 measuring up to 1.3 cm (series 2, images 36, 26, and 24). The heart is normal  
 in size.. There is no pericardial effusion.  Moderate to severe atherosclerotic
   calcification of aorta and coronary arteries. Main pulmonary artery measures 
  2.5 cm.      UPPER ABDOMEN: Cholelithiasis.      BONES: Mild thoracic spine sc
oliosis and degenerative changes.      SOFT TISSUES: Evidence of right mastectom
y.      IMPRESSION:    No evidence of pulmonary embolism.   Lung nodules as abov
e, concerning for recurrent disease.   Right axillary lymphadenopathy.          
  Signed by: Dr. Rachel Brasher MD on 2018 9:36 AM        Dictated By: BABA MAGALI BRASHER MD  Electronically Signed By: RACHEL BRASHER MD on 18  Trans
cribed By: TRAVIS on 18       COPY TO:   BECKY RAMIREZ MD        
Triglycerides Lmaof4413-49-63 04:11:00* 



             Test Item    Value        Reference Range Interpretation Comments

 

             Triglycerides Level (test code = 2571-8) 112          0-149        

              





[facility] TexasCholesterol Xwmfo6193-24-63 04:11:00* 



             Test Item    Value        Reference Range Interpretation Comments

 

             Cholesterol Level (test code = 2093-3) 132          0-199          

            





Less than 200 mg/dL       Low Haxg513 - 239 mg/dL           Borderline Qpwo581 m
g/dl and greater     High Risk                        [facility] TexasLDL Dlumeaffdaf5369-24-44 04:11:00* 



             Test Item    Value        Reference Range Interpretation Comments

 

             LDL Cholesterol (test code = 2089-1) 64                      

          





[facility] TexasHDL Hwjwggxtwnz7808-81-84 04:11:00* 



             Test Item    Value        Reference Range Interpretation Comments

 

             HDL Cholesterol (test code = 2085-9) 46           40-60            

          





[facility] TexasCholesterol/HDL Neers7905-00-60 04:11:00
  * 



             Test Item    Value        Reference Range Interpretation Comments

 

             Cholesterol/HDL Ratio (test code = 9830-1) 2.9          3.9-4.7    

  L             





[facility] TexasTriglycerides Jvfbg9582-20-81 04:11:00* 



             Test Item    Value        Reference Range Interpretation Comments

 

             Triglycerides Level (test code = 2571-8) 112          0-149        

              





[facility] TexasCholesterol Whwbt9854-70-18 04:11:00* 



             Test Item    Value        Reference Range Interpretation Comments

 

             Cholesterol Level (test code = 2093-3) 132          0-199          

            





Less than 200 mg/dL       Low Vwiy793 - 239 mg/dL           Borderline Maev423 m
g/dl and greater     High Risk                        [facility] TexasLDL Atzanzrxhhr3411-14-92 04:11:00* 



             Test Item    Value        Reference Range Interpretation Comments

 

             LDL Cholesterol (test code = 2089-1) 64                      

          





[facility] TexasHDL Sqezrlkiwhx5952-86-07 04:11:00* 



             Test Item    Value        Reference Range Interpretation Comments

 

             HDL Cholesterol (test code = 2085-9) 46           40-60            

          





[facility] TexasCholesterol/HDL Issiu6281-97-98 04:11:00
  * 



             Test Item    Value        Reference Range Interpretation Comments

 

             Cholesterol/HDL Ratio (test code = 9830-1) 2.9          3.9-4.7    

  L             





[facility] TexasCreatine Eonhfh5669-79-61 03:55:00* 



             Test Item    Value        Reference Range Interpretation Comments

 

             Creatine Kinase (test code = 2157-6) 95                      

          





[facility] TexasCreatine Kinase LW7667-96-78 03:55:00* 



             Test Item    Value        Reference Range Interpretation Comments

 

             Creatine Kinase MB (test code = 42100-0) 2.10         0-5.0        

              





[facility] TexasTroponin -84-12 03:55:00* 



             Test Item    Value        Reference Range Interpretation Comments

 

             Troponin I (test code = ZUI0554) 0.009        0-0.300              

      





[facility] TexasCreatine Sssqee2873-65-75 03:55:00* 



             Test Item    Value        Reference Range Interpretation Comments

 

             Creatine Kinase (test code = 2157-6) 95                      

          





[facility] TexasCreatine Kinase LX3504-88-18 03:55:00* 



             Test Item    Value        Reference Range Interpretation Comments

 

             Creatine Kinase MB (test code = 50969-7) 2.10         0-5.0        

              





[facility] TexasTroponin -69-50 03:55:00* 



             Test Item    Value        Reference Range Interpretation Comments

 

             Troponin I (test code = VSQ8991) 0.009        0-0.300              

      





[facility] TexasThyroid Stimulating Hormone (TSH)
2018-08-10 15:06:00* 



             Test Item    Value        Reference Range Interpretation Comments

 

             Thyroid Stimulating Hormone (TSH) (test code = 61675-8) 1.482      

  0.350-4.940                





[facility] TexasThyroid Stimulating Hormone (TSH)
2018-08-10 15:06:00* 



             Test Item    Value        Reference Range Interpretation Comments

 

             Thyroid Stimulating Hormone (TSH) (test code = 17807-3) 1.482      

  0.350-4.940                





[facility] TexasCHEST SINGLE (NOT PORTABLE)2018-08-10 
12:38:00    [address] [address]      Patient Name: ANI GUALLPA   MR 
#: [id number]    : 1945 Age/Sex: 72/M  Acct #: [id number] Req #: [id number]
[id number]  Adm Physician:     Ordered by: JANAE ZAVALA NP  Report #: [id number]
[id number]   Location: ER  Room/Bed:     
__________________________________________________________________
_________________________________    Procedure: [id number] DX/CHEST SINGLE (NOT P
ORTABLE)  Exam Date: 08/10/18                            Exam Time: 1035       R
EPORT STATUS: Signed    Examination: Single AP view of the chest.      COMPARISO
N: Chest 2 views 2013      INDICATION: Chest pain          IMPRESSION: Exa
m limited by patient rotation.       1.  Lines and Tubes: None   2.  Lungs are w
ell-inflated. No nodules, masses or consolidation. Linear   opacity in the left 
lower lung, which may represent subsegmental atelectasis or   scarring.   3.  Ca
rdiomediastinal silhouette is normal.   Pulmonary vasculature is normal.   4.  M
etallic clips project over the right axillary region. No acute bony   abnormalit
ies.      Signed by: Dr. Clifton Silveira M.D. on 8/10/2018 12:40 PM        Di
ctated By: CLIFTON SILVEIRA MD  Electronically Signed By: CLIFTON SILVEIRA MD on 08/
10/18 1240  Transcribed By: TRAVIS on 08/10/18 1240       COPY TO:   BRYANNA ZAVALA NP        SP LUMBAR, COMPLETE MIN 4VW2018-08-10 12:31:00    [address] [address]      Patient Name: ANI GUALLPA   MR #: [id number]    : 
1945 Age/Sex: 72/M  Acct #: [id number] Req #: [id number]  Adm Physician:
     Ordered by: JANAE ZAVALA NP  Report #: [id number]   Location: ER  
Room/Bed:     __________________________________________________________________
_________________________________    Procedure: [id number] DX/SP LUMBAR, COMPLETE
 MIN 4VW  Exam Date: 08/10/18                            Exam Time: 1035       R
EPORT STATUS: Signed    EXAMINATION: Lumbar spine series.      CLINICAL HISTORY:
 Chest pain, low back pain      COMPARISON:  None.        DISCUSSION:  5 views o
f the lumbar spine are submitted for interpretation.    Five nonrib-bearing lumb
ar type vertebral bodies are identified. No acute,   displaced fractures or subl
uxation.  Multilevel moderate degenerative disc   changes in the lumbosacral spi
ne with associated levoscoliosis, worse at L3-L4,   L5-S1. Facet hypertrophy L4-
L5 and L5-S1. Grade 1 anterolisthesis of L4 on L3.    Vertebral body heights are
 preserved. Moderate intervertebral disc space   narrowing with vacuum phenomeno
n at L3-L4 and L5-S1. Bilateral oblique views   show no spondylolysis. Degenerat
franklyn changes in bilateral sacroiliac joints.   Marked atherosclerotic calcificati
on of the abdominal aorta.   Ill-defined somewhat linear calcific densities proj
ecting in the left upper   quadrant left 12th rib as well as right upper quadran
t over the L1 right   transverse process are likely vascular in nature.      IMP
RESSION:       1. No acute abnormalities. Multilevel degenerative disc and joint
 disease with   associated levoscoliosis         The staff physician below has p
ersonally reviewed this exam on the date of   dictation.                  Signed
 by: Dr. Clifton Silveira M.D. on 8/10/2018 12:34 PM        Dictated By: CHRISTOPHER SILVEIRA MD  Electronically Signed By: CLIFTON SILVEIRA MD on 08/10/18 1234  Rivas
scribed By: TRAVIS on 08/10/18 1234       COPY TO:   JANAE ZAVALA NP      
  Urine WBC2018-08-10 12:27:00* 



             Test Item    Value        Reference Range Interpretation Comments

 

             Urine WBC (test code = 5821-4) NONE         0-5                    

    





[facility] TexasUrine RBC2018-08-10 12:27:00* 



             Test Item    Value        Reference Range Interpretation Comments

 

             Urine RBC (test code = 41422-8) NONE         0-5                   

     





[facility] TexasUrine Bacteria2018-08-10 12:27:00* 



             Test Item    Value        Reference Range Interpretation Comments

 

             Urine Bacteria (test code = 77282-9) NONE         NONE             

          





[facility] TexasUrine Epithelial Cells2018-08-10 12:27:00
  * 



             Test Item    Value        Reference Range Interpretation Comments

 

             Urine Epithelial Cells (test code = 70661-9) RARE         NONE     

                  





[facility]Urine Color2018-08-10 12:14:00* 



             Test Item    Value        Reference Range Interpretation Comments

 

             Urine Color (test code = 5778-6) YELLOW       YELLOW               

      





[facility] TexasUrine Clarity2018-08-10 12:14:00* 



             Test Item    Value        Reference Range Interpretation Comments

 

             Urine Clarity (test code = 49288-2) CLEAR        CLEAR             

         





[facility] TexasUrine Specific Gravity2018-08-10 12:14:00
  * 



             Test Item    Value        Reference Range Interpretation Comments

 

             Urine Specific Gravity (test code = 5811-5) 1.025        1.010-1.02

5                





[facility] TexasUrine pH2018-08-10 12:14:00* 



             Test Item    Value        Reference Range Interpretation Comments

 

             Urine pH (test code = 20589-8) 6            5-7                    

    





[facility] TexasUrine Leukocyte Esterase2018-08-10 
12:14:00* 



             Test Item    Value        Reference Range Interpretation Comments

 

             Urine Leukocyte Esterase (test code = 5799-2) NEGATIVE     NEGATIVE

                   





[facility]Urine Nitrite2018-08-10 12:14:00* 



             Test Item    Value        Reference Range Interpretation Comments

 

             Urine Nitrite (test code = 70231-8) NEGATIVE     NEGATIVE          

         





[facility]Urine Protein2018-08-10 12:14:00* 



             Test Item    Value        Reference Range Interpretation Comments

 

             Urine Protein (test code = 5804-0) NEGATIVE     NEGATIVE           

        





[facility] TexasUrine Glucose (UA)2018-08-10 12:14:00* 



             Test Item    Value        Reference Range Interpretation Comments

 

             Urine Glucose (UA) (test code = 2349-9) NEGATIVE     NEGATIVE      

             





[facility]Urine Ketones2018-08-10 12:14:00* 



             Test Item    Value        Reference Range Interpretation Comments

 

             Urine Ketones (test code = 74989-8) NEGATIVE     NEGATIVE          

         





[facility] TexasUrine Urobilinogen2018-08-10 12:14:00* 



             Test Item    Value        Reference Range Interpretation Comments

 

             Urine Urobilinogen (test code = 82951-3) 0.2          0.2-1        

              





[facility] Bilirubin2018-08-10 12:14:00* 



             Test Item    Value        Reference Range Interpretation Comments

 

             Urine Bilirubin (test code = 1978-6) NEGATIVE     NEGATIVE         

          





[facility] TexasUrine Blood2018-08-10 12:14:00* 



             Test Item    Value        Reference Range Interpretation Comments

 

             Urine Blood (test code = 62901-1) TRACE        NEGATIVE     H      

       





[facility]10 11:07:00    [facility]   [address] [address]      Patient Name: ANI GUALLPA   MR #: [id number]    : 
1945 Age/Sex: 72/M  Acct #: [id number] Req #: [id number]  Adm Physician:
 MICHAEL RAMIREZ MD    Ordered by: JANAE ZAVALA NP  Report #: [id number]   
Location: MED/SURG  Room/Bed: [facility]    _______________________________________
____________________________________________________________    Procedure: [id number]
[id number] CT/CT BRAIN WO  Exam Date: 08/10/18                            Exam Time: 1
030       REPORT STATUS: Signed       ******** ADDENDUM #1 ********      Dose mo
dulation, iterative reconstruction, and/or weight based adjustment of   the mA/k
V was utilized to reduce the radiation dose to as low as reasonably   achievable
.      Signed by: Dr. Tonio Herrera M.D. on 2018 10:01 AM   ******** ORIGINA
L REPORT ********      EXAMINATION: Head CT       HISTORY: Generalized weakness 
since the day before, chest and lower back pain,   history of breast cancer and 
lung cancer.   COMPARISON: Brain MRI on 2013   TECHNIQUE: Multidetector ax
ial images were obtained without contrast from the   foramen magnum to the verte
x . The images were reconstructed using brain and   bone algorithms.  Thin secti
on brain images were reformatted into coronal and   sagittal planes.   Intraveno
us contrast: None.    Image quality: Motion/streaking artifact limits the evalua
tion of the skull   base and posterior cranial fossa.      FINDINGS:           P
arenchyma:    1.  Stable mild chronic microvascular ischemic changes.   2.  Smal
l chronic lacunar infarct in the left caudate nucleus was not seen on   prior MR
I in 2019.   3.  No mass or hemorrhage. No CT evidence of acute territoria
l vascular insult.                  Extra-axial spaces:No abnormal density. No e
xtra-axial fluid collections         Brain volume: Normal for age.         Ventr
icles: No hydrocephalus or displacement.          Arteries: No density suggestiv
e of thrombus.         Dural sinuses: No abnormal density.         Extra-axial s
paces: No abnormal density.         Foramen magnum: No mass, Chiari malformation
, or basilar invagination.         Sella: No obvious mass.          Paranasal/ma
stoid sinuses: Imaged portions unremarkable.         Skull/Scalp: No lytic or bl
astic lesions.  No fractures.       IMPRESSION:      1.  No intracranial mass, h
emorrhage vasogenic edema or mass effect.   2.  Persistent minimal chronic micro
vascular ischemic changes.   3.  Small chronic lacunar infarct in the left basal
 ganglia that was not seen   on prior MRI dated 2015.      Signed by: Dr. Toino Herrera M.D. on 8/10/2018 11:11 AM        Dictated By: TONIO HERRERA MD  Elec
tronically Signed By: TONIO HERRERA MD on 18 1001  Transcribed By: TRAVIS harman 08/10/18 1111       COPY TO:   JANAE ZAVALA NP        Sodium Level
2018-08-10 11:03:00* 



             Test Item    Value        Reference Range Interpretation Comments

 

             Sodium Level (test code = 2951-2) 142          136-145             

       





[facility] TexasPotassium Level2018-08-10 11:03:00* 



             Test Item    Value        Reference Range Interpretation Comments

 

             Potassium Level (test code = 2823-3) 3.6          3.5-5.1          

          





[facility] TexasChloride Level2018-08-10 11:03:00* 



             Test Item    Value        Reference Range Interpretation Comments

 

             Chloride Level (test code = 2075-0) 109                 H    

         





[facility] TexasCarbon Dioxide Level2018-08-10 11:03:00* 



             Test Item    Value        Reference Range Interpretation Comments

 

             Carbon Dioxide Level (test code = 8-9) 23           22-29       

               





[facility] TexasAnion Gap2018-08-10 11:03:00* 



             Test Item    Value        Reference Range Interpretation Comments

 

             Anion Gap (test code = 33037-3) 13.6         8-16                  

     





[facility] TexasBlood Urea Nitrogen2018-08-10 11:03:00* 



             Test Item    Value        Reference Range Interpretation Comments

 

             Blood Urea Nitrogen (test code = 3094-0) 14           7-26         

              





[facility] TexasCreatinine2018-08-10 11:03:00* 



             Test Item    Value        Reference Range Interpretation Comments

 

             Creatinine (test code = 2160-0) 0.88         0.72-1.25             

     





[facility] TexasBUN/Creatinine Ratio2018-08-10 11:03:00* 



             Test Item    Value        Reference Range Interpretation Comments

 

             BUN/Creatinine Ratio (test code = 3097-3) 16           6-25        

               





[facility] TexasEstimat Glomerular Filtration Rate
2018-08-10 11:03:00* 



             Test Item    Value        Reference Range Interpretation Comments

 

             Estimat Glomerular Filtration Rate (test code = 63727-3) 60-       

   >60                        





Ranges were taken from the National Kidney Disease Education Program and the [facility] literature.Reference ranges:60 or greater: Mspwww55-17 (
for 3 consecutive months): Chronic kidney disease 15 or less: Kidney failureThe Medical Center of Southeast TexasGlucose Level2018-08-10 11:03:00* 



             Test Item    Value        Reference Range Interpretation Comments

 

             Glucose Level (test code = SLO5361) 116                      

         





[facility] TexasCalcium Level2018-08-10 11:03:00* 



             Test Item    Value        Reference Range Interpretation Comments

 

             Calcium Level (test code = 41256-6) 9.3          8.4-10.2          

         





[facility] TexasTotal Bilirubin2018-08-10 11:03:00* 



             Test Item    Value        Reference Range Interpretation Comments

 

             Total Bilirubin (test code = 1975-2) 1.1          0.2-1.2          

          





[facility] TexasAspartate Amino Transf (AST/SGOT)
2018-08-10 11:03:00* 



             Test Item    Value        Reference Range Interpretation Comments

 

                                        Aspartate Amino Transf (AST/SGOT) (test 

code = Aspartate Amino Transf 

(AST/SGOT))     25              5-34                             





[facility] TexasAlanine Aminotransferase (ALT/SGPT)
2018-08-10 11:03:00* 



             Test Item    Value        Reference Range Interpretation Comments

 

             Alanine Aminotransferase (ALT/SGPT) (test code = 1742-6) 18        

   0-55                       





[facility] TexasTotal Protein2018-08-10 11:03:00* 



             Test Item    Value        Reference Range Interpretation Comments

 

             Total Protein (test code = 2885-2) 6.9          6.5-8.1            

        





[facility] TexasAlbumin2018-08-10 11:03:00* 



             Test Item    Value        Reference Range Interpretation Comments

 

             Albumin (test code = 1751-7) 3.4          3.5-5.0      L           

  





[facility] TexasGlobulin2018-08-10 11:03:00* 



             Test Item    Value        Reference Range Interpretation Comments

 

             Globulin (test code = 66972-0) 3.5          2.3-3.5                

    





[facility] [place]ob[place]10 11:03:00
  * 



             Test Item    Value        Reference Range Interpretation Comments

 

             Albumin/Globulin Ratio (test code = 1759-0) 1.0          0.8-2.0   

                 





[facility] [place]Alk[facility]atase2018-08-10 11:03:00* 



             Test Item    Value        Reference Range Interpretation Comments

 

             Alkaline Phosphatase (test code = 6768-6) 63                 

               





[facility] [place]Activated Partial Thromboplast Time
2018-08-10 10:50:00* 



             Test Item    Value        Reference Range Interpretation Comments

 

             Activated Partial Thromboplast Time (test code = 03271-8) 29.4     

    23.8-35.5                  





[facility] [place]Prothrombin Time2018-08-10 10:48:00* 



             Test Item    Value        Reference Range Interpretation Comments

 

             Prothrombin Time (test code = 5902-2) 15.2         11.9-14.5    H  

           





[facility] [place]Prothromb Time International Ratio
2018-08-10 10:48:00* 



             Test Item    Value        Reference Range Interpretation Comments

 

             Prothromb Time International Ratio (test code = 6301-6) 1.30       

                             





Oral Anticoagulant Therapy INR Values:1. Low Intensity Therapy        1.5 - 2.02
. Moderate Intensity Therapy   2.0 - 3.03. High Intensity Therapy(1)    2.5 - 3.
54. High Intensity Therapy(2)    3.0 - 4.05. Panic Value INR              > 5.0
[facility] [place]White Blood Count2018-08-10 10:39:00* 



             Test Item    Value        Reference Range Interpretation Comments

 

             White Blood Count (test code = 6690-2) 6.18         4.8-10.8       

            





[facility] [place]Red [facility]2018-08-10 10:39:00* 



             Test Item    Value        Reference Range Interpretation Comments

 

             Red Blood Count (test code = 789-8) 3.77         4.3-5.7      L    

         





[facility] [place]Hemoglobin2018-08-10 10:39:00* 



             Test Item    Value        Reference Range Interpretation Comments

 

             Hemoglobin (test code = 39032-6) 12.1         14.0-18.0    L       

      





[facility] [place]Hematocrit2018-08-10 10:39:00* 



             Test Item    Value        Reference Range Interpretation Comments

 

             Hematocrit (test code = 4544-3) 36.3         38.2-49.6    L        

     





[facility] [place]Mean Corpuscular Volume2018-08-10 
10:39:00* 



             Test Item    Value        Reference Range Interpretation Comments

 

             Mean Corpuscular Volume (test code = 787-2) 96.3         81-99     

                 





[facility] [place]Mean Corpuscular Hemoglobin2018-08-10 
10:39:00* 



             Test Item    Value        Reference Range Interpretation Comments

 

             Mean Corpuscular Hemoglobin (test code = 785-6) 32.1         28-32 

       H             





[facility] [place]Mean Corpuscular Hemoglobin Concent
2018-08-10 10:39:00* 



             Test Item    Value        Reference Range Interpretation Comments

 

             Mean Corpuscular Hemoglobin Concent (test code = 786-4) 33.3       

  31-35                      





[facility] [place]Red Cell Distribution Width2018-08-10 
10:39:00* 



             Test Item    Value        Reference Range Interpretation Comments

 

             Red Cell Distribution Width (test code = 38562-0) 12.7         11.7

-14.4                  





[facility] [place]Platelet Count2018-08-10 10:39:00* 



             Test Item    Value        Reference Range Interpretation Comments

 

             Platelet Count (test code = 777-3) 227          140-360            

        





[facility] [place]Neutrophils (%) (Auto)2018-08-10 10:39:00
  * 



             Test Item    Value        Reference Range Interpretation Comments

 

             Neutrophils (%) (Auto) (test code = 70836-8) 59.4         38.7-80.0

                  





[facility] [place]Lymphocytes (%) (Auto)2018-08-10 10:39:00
  * 



             Test Item    Value        Reference Range Interpretation Comments

 

             Lymphocytes (%) (Auto) (test code = 736-9) 18.4         18.0-39.1  

                





[facility] [place]Monocytes (%) (Auto)2018-08-10 10:39:00* 



             Test Item    Value        Reference Range Interpretation Comments

 

             Monocytes (%) (Auto) (test code = 5905-5) 10.2         4.4-11.3    

               





[facility] [place]Eosinophils (%) (Auto)2018-08-10 10:39:00
  * 



             Test Item    Value        Reference Range Interpretation Comments

 

             Eosinophils (%) (Auto) (test code = 713-8) 10.8         0.0-6.0    

  H             





[facility] [place]Basophils (%) (Auto)2018-08-10 10:39:00* 



             Test Item    Value        Reference Range Interpretation Comments

 

             Basophils (%) (Auto) (test code = 706-2) 1.0          0.0-1.0      

              





[facility] [place]IM GRANULOCYTES %2018-08-10 10:39:00* 



             Test Item    Value        Reference Range Interpretation Comments

 

             IM GRANULOCYTES % (test code = IM GRANULOCYTES %) 0.2          0.0-

1.0                    





[facility] [place]Neutrophils # (Auto)2018-08-10 10:39:00* 



             Test Item    Value        Reference Range Interpretation Comments

 

             Neutrophils # (Auto) (test code = 751-8) 3.7          2.1-6.9      

              





[facility] [place]Lymphocytes # (Auto)2018-08-10 10:39:00* 



             Test Item    Value        Reference Range Interpretation Comments

 

             Lymphocytes # (Auto) (test code = 20313-9) 1.1          1.0-3.2    

                





[facility] [place]Monocytes # (Auto)2018-08-10 10:39:00* 



             Test Item    Value        Reference Range Interpretation Comments

 

             Monocytes # (Auto) (test code = 742-7) 0.6          0.2-0.8        

            





[facility] [place]Eosinophils # (Auto)2018-08-10 10:39:00* 



             Test Item    Value        Reference Range Interpretation Comments

 

             Eosinophils # (Auto) (test code = 711-2) 0.7          0.0-0.4      

H             





[facility] [place]Basophils # (Auto)2018-08-10 10:39:00* 



             Test Item    Value        Reference Range Interpretation Comments

 

             Basophils # (Auto) (test code = 704-7) 0.1          0.0-0.1        

            





[facility] [place]Absolute Immature Granulocyte (auto
2018-08-10 10:39:00* 



             Test Item    Value        Reference Range Interpretation Comments

 

                                        Absolute Immature Granulocyte (auto (norma

t code = Absolute Immature Granulocyte 

(auto)          0.01            0-0.1                            





[facility] [place]

## 2020-09-02 NOTE — DIAGNOSTIC IMAGING REPORT
TECHNIQUE: Frontal view of the chest.



INDICATION: 

^SoB

^[id number]

^1200



COMPARISON:  12/20/2018



DISCUSSION:

Limited evaluation due to portable technique and patient rotation.



Lines and hardware: Overlying EKG leads are noted. Left chest wall subclavian

port is noted with tip projecting at the upper SVC.

Heart and mediastinum: Cardiomedial sinus silhouette is within normal limits.

Central vascular congestion is noted.

Lungs and pleura: Negative for focal consolidation, large effusion or

pneumothorax. Lungs are hyperexpanded. There are bilateral nodular pulmonary

opacities measuring up to 3 cm and the left and 3 cm on the right.

Soft tissues and bones: Postsurgical changes of the right axilla are noted.

Negative for acute osseous abnormality. Stable right convex curvature of the

thoracic spine with associated degenerative changes.



IMPRESSION:



1. Negative for focal consolidation.

2. Lungs are hyperexpanded concerning for emphysematous change.

3. Bilateral multiple pulmonary nodules/masses measuring up to 3 cm are

concerning for metastatic disease. Consider cross-sectional imaging for further

evaluation.









Signed by: Pranav Long MD on 9/2/2020 12:53 PM

## 2020-09-02 NOTE — EMERGENCY DEPARTMENT NOTE
History of Present Illnes


History of Present Illness


Chief Complaint:  General Medicine Complaints


History of Present Illness


This is a 74 year old  male Chief Complaint Comment SENT BY DR. BREWSTER FOR EVALUATION OF HIS THROAT CANCER X 1 1/2 WEEK, NOT EATING, 

DIFFICULTY BREATHING, LEFT EAR FEELS CLOGGED.


Historian:  Patient


Arrival Mode:  Car


 Required:  No


Onset (how long ago):  week(s)


Location:  Throat


Quality:  inability to swallow


Radiation:  Reports non-radiation


Severity:  moderate


Onset quality:  gradual


Duration (how long):  week(s)


Timing of current episode:  constant


Progression:  worsening


Chronicity:  new


Context:  Denies recent illness, Denies recent surgery


Relieving factors:  none


Exacerbating factors:  none


Associated symptoms:  Reports shortness of breath





Past Medical/Family History


Physician Review


I have reviewed the patient's past medical and family history.  Any updates have

been documented here.





Past Medical History


Recent Fever:  No


Clinical Suspicion of Infectio:  No


New/Unexplained Change in Ment:  No


Past Medical History:  None, Cancer


Other Medical History:  


BREAST CA





LUNG CA





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


Past Surgical History:  Cholecysctectomy, Hernia Repair


Other Surgery:  


BREAST CANCER SURGERY





Social History


Physically hurt or threatened:  No





Other


Last Tetanus:  GREATER THAN 5 YEARS





Review of Systems


Review of Systems


Constitutional:  Reports no symptoms


EENTM:  Reports as per HPI, Reports other (Difficulty swallowing)


Cardiovascular:  Reports no symptoms


Respiratory:  Reports no symptoms


Gastrointestinal:  Reports no symptoms


Genitourinary:  Reports no symptoms


Musculoskeletal:  Reports no symptoms


Integumentary:  Reports no symptoms


Neurological:  Reports no symptoms


Psychological:  Reports no symptoms


Endocrine:  Reports no symptoms


Hematological/Lymphatic:  Reports no symptoms





Physical Exam


Related Data


Allergies:  


Coded Allergies:  


     morphine (Verified  Allergy, Unknown, 3/13/20)


Triage Vital Signs





Vital Signs








  Date Time  Temp Pulse Resp B/P (MAP) Pulse Ox O2 Delivery O2 Flow Rate FiO2


 


9/2/20 11:49 98.6 106 18 129/82 99 Room Air  








Vital signs reviewed:  Yes





Physical Exam


CONSTITUTIONAL





Constitutional:  Present cachectic


HENT


HENT:  Present normocephalic, Present atraumatic, Present oropharynx 

clear/moist, Present nose normal


HENT L/R:  Present left ext ear normal, Present right ext ear normal


EYES





Eyes:  Reports PERRL, Reports conjunctivae normal


NECK


Neck:  Present ROM normal


PULMONARY


Pulmonary:  Present effort normal, Present breath sounds normal


CARDIOVASCULAR





Cardiovascular:  Present regular rhythm, Present heart sounds normal, Present 

capillary refill normal, Present normal rate


GASTROINTESTINAL





Abdominal:  Present soft, Present nontender, Present bowel sounds normal


GENITOURINARY





Genitourinary:  Present exam deferred


SKIN


Skin:  Present warm, Present dry


MUSCULOSKELETAL





Musculoskeletal:  Present ROM normal


NEUROLOGICAL





Neurological:  Present alert, Present oriented x 3, Present no gross motor or 

sensory deficits


PSYCHOLOGICAL


Psychological:  Present mood/affect normal, Present judgement normal





Assessment & Plan


Medical Decision Making


MDM


74 y.o M w/ Hx of Lung CA. W/u shows metastasis to throat and chest. CARINA w/ Cr. 

1.4. Discussed with Dr. Cannon who will admit.





Reassessment


Reassessment time:  15:50


Reassessment


Well appearing, NAD





Assessment & Plan


Final Impression:  


(1) Cancer


(2) CARINA (acute kidney injury)


Depart Disposition:  ADMITTED


Last Vital Signs











  Date Time  Temp Pulse Resp B/P (MAP) Pulse Ox O2 Delivery O2 Flow Rate FiO2


 


9/2/20 11:49 98.6 106 18 129/82 99 Room Air  








Home Meds


Reported Medications


Albuterol Sulfate (VENTOLIN HFA) 18 Gm Hfa.aer.ad, INH PRN


   3/27/20


Bifidobacterium Infantis (ALIGN) 4 Mg Capsule, 1 CAP PO HS


   3/27/20


Wheat Dextrin (BENEFIBER) 1 Each Powd.pack, PO DAILY


   3/27/20


Famotidine (FAMOTIDINE) 20 Mg Tab, 40 MG PO DAILY, #30 TAB


   3/13/20


Dicyclomine Hcl (DICYCLOMINE HCL) 10 Mg Capsule, 1 TAB PO QID


   3/13/20


Linaclotide (Linzess) 72 Mcg Capsule, PO DAILY


   3/13/20


Ascorbic Acid (VITAMIN C) 60 Mg Lozenge, 1 TAB PO DAILY


   3/13/20


Vitamin E Mixed (VITAMIN E) 400 Unit Capsule, PO DAILY


   3/13/20


Multivitamin (MULTIVITAMINS) 1 Each Capsule, 1 TAB PO DAILY


   3/13/20


Lorazepam (ATIVAN) 1 Mg Tablet, 1 MG PO PRN PRN for ANXIETY


   8/11/18


Hydrocodone Bit/Acetaminophen (HYDROCODON-ACETAMINOPH 7.5-300) 1 Each Tablet, 

7.5 MG PO PRN


   8/11/18


Loratadine (LORATADINE) 10 Mg Tablet, 10 MG PO HS


   1/27/13


Aspirin (ASPIR-SILAS) 325 Mg Tablet., 81 MG PO HS


   1/27/13











BOB GEE MD           Sep 2, 2020 12:08

## 2020-09-03 VITALS — DIASTOLIC BLOOD PRESSURE: 59 MMHG | SYSTOLIC BLOOD PRESSURE: 117 MMHG

## 2020-09-03 VITALS — SYSTOLIC BLOOD PRESSURE: 141 MMHG | DIASTOLIC BLOOD PRESSURE: 78 MMHG

## 2020-09-03 VITALS — DIASTOLIC BLOOD PRESSURE: 70 MMHG | SYSTOLIC BLOOD PRESSURE: 118 MMHG

## 2020-09-03 VITALS — SYSTOLIC BLOOD PRESSURE: 153 MMHG | DIASTOLIC BLOOD PRESSURE: 76 MMHG

## 2020-09-03 VITALS — SYSTOLIC BLOOD PRESSURE: 135 MMHG | DIASTOLIC BLOOD PRESSURE: 75 MMHG

## 2020-09-03 VITALS — DIASTOLIC BLOOD PRESSURE: 64 MMHG | SYSTOLIC BLOOD PRESSURE: 135 MMHG

## 2020-09-03 LAB
ANION GAP SERPL CALC-SCNC: 11.5 MMOL/L (ref 8–16)
BASOPHILS # BLD AUTO: 0 10*3/UL (ref 0–0.1)
BASOPHILS NFR BLD AUTO: 0.4 % (ref 0–1)
BUN SERPL-MCNC: 21 MG/DL (ref 7–26)
BUN/CREAT SERPL: 22 (ref 6–25)
CALCIUM SERPL-MCNC: 9 MG/DL (ref 8.4–10.2)
CHLORIDE SERPL-SCNC: 107 MMOL/L (ref 98–107)
CO2 SERPL-SCNC: 25 MMOL/L (ref 22–29)
DEPRECATED NEUTROPHILS # BLD AUTO: 8.9 10*3/UL (ref 2.1–6.9)
DEPRECATED PHOSPHATE SERPL-MCNC: 2.8 MG/DL (ref 2.3–4.7)
EGFRCR SERPLBLD CKD-EPI 2021: > 60 ML/MIN (ref 60–?)
EOSINOPHIL # BLD AUTO: 0.1 10*3/UL (ref 0–0.4)
EOSINOPHIL NFR BLD AUTO: 0.9 % (ref 0–6)
ERYTHROCYTE [DISTWIDTH] IN CORD BLOOD: 13.6 % (ref 11.7–14.4)
GLUCOSE SERPLBLD-MCNC: 82 MG/DL (ref 74–118)
HCT VFR BLD AUTO: 33.1 % (ref 38.2–49.6)
HGB BLD-MCNC: 10 G/DL (ref 14–18)
LYMPHOCYTES # BLD: 0.6 10*3/UL (ref 1–3.2)
LYMPHOCYTES NFR BLD AUTO: 5.4 % (ref 18–39.1)
MAGNESIUM SERPL-MCNC: 2.1 MG/DL (ref 1.3–2.1)
MCH RBC QN AUTO: 27.9 PG (ref 28–32)
MCHC RBC AUTO-ENTMCNC: 30.2 G/DL (ref 31–35)
MCV RBC AUTO: 92.5 FL (ref 81–99)
MONOCYTES # BLD AUTO: 1.1 10*3/UL (ref 0.2–0.8)
MONOCYTES NFR BLD AUTO: 10 % (ref 4.4–11.3)
NEUTS SEG NFR BLD AUTO: 82.7 % (ref 38.7–80)
PLATELET # BLD AUTO: 232 X10E3/UL (ref 140–360)
POTASSIUM SERPL-SCNC: 3.5 MMOL/L (ref 3.5–5.1)
RBC # BLD AUTO: 3.58 X10E6/UL (ref 4.3–5.7)
SODIUM SERPL-SCNC: 140 MMOL/L (ref 136–145)
TSH SERPL DL<=0.005 MIU/L-ACNC: 1.2 UIU/ML (ref 0.35–4.94)

## 2020-09-03 RX ADMIN — FAMOTIDINE SCH MG: 10 INJECTION, SOLUTION INTRAVENOUS at 08:33

## 2020-09-03 RX ADMIN — DOCUSATE SODIUM SCH MG: 100 TABLET, FILM COATED ORAL at 17:00

## 2020-09-03 RX ADMIN — FAMOTIDINE SCH MG: 10 INJECTION, SOLUTION INTRAVENOUS at 17:04

## 2020-09-03 RX ADMIN — DOCUSATE SODIUM SCH MG: 100 TABLET, FILM COATED ORAL at 09:00

## 2020-09-03 RX ADMIN — SODIUM CHLORIDE SCH MLS/HR: 9 INJECTION, SOLUTION INTRAVENOUS at 17:04

## 2020-09-03 RX ADMIN — SODIUM CHLORIDE SCH MLS/HR: 9 INJECTION, SOLUTION INTRAVENOUS at 07:20

## 2020-09-03 NOTE — NUR
Patient informed about upcoming surgical procedure (EGD and PEG Tube placement) and NPO 
status after midnight. Patient verbalized understanding and voluntarily signed "Disclosure 
and Consent " form

## 2020-09-03 NOTE — NUR
Patient resting comfortably. Walking rounds done. BSSR given to oncoming nurse regarding 
patient's status.

## 2020-09-04 VITALS — DIASTOLIC BLOOD PRESSURE: 61 MMHG | SYSTOLIC BLOOD PRESSURE: 137 MMHG

## 2020-09-04 VITALS — DIASTOLIC BLOOD PRESSURE: 74 MMHG | SYSTOLIC BLOOD PRESSURE: 143 MMHG

## 2020-09-04 VITALS — DIASTOLIC BLOOD PRESSURE: 110 MMHG | SYSTOLIC BLOOD PRESSURE: 133 MMHG

## 2020-09-04 VITALS — DIASTOLIC BLOOD PRESSURE: 61 MMHG | SYSTOLIC BLOOD PRESSURE: 147 MMHG

## 2020-09-04 VITALS — SYSTOLIC BLOOD PRESSURE: 143 MMHG | DIASTOLIC BLOOD PRESSURE: 74 MMHG

## 2020-09-04 VITALS — SYSTOLIC BLOOD PRESSURE: 146 MMHG | DIASTOLIC BLOOD PRESSURE: 68 MMHG

## 2020-09-04 VITALS — SYSTOLIC BLOOD PRESSURE: 153 MMHG | DIASTOLIC BLOOD PRESSURE: 64 MMHG

## 2020-09-04 LAB
ANION GAP SERPL CALC-SCNC: 16.8 MMOL/L (ref 8–16)
BASOPHILS # BLD AUTO: 0.1 10*3/UL (ref 0–0.1)
BASOPHILS NFR BLD AUTO: 0.4 % (ref 0–1)
BUN SERPL-MCNC: 16 MG/DL (ref 7–26)
BUN/CREAT SERPL: 19 (ref 6–25)
CALCIUM SERPL-MCNC: 9 MG/DL (ref 8.4–10.2)
CHLORIDE SERPL-SCNC: 111 MMOL/L (ref 98–107)
CO2 SERPL-SCNC: 20 MMOL/L (ref 22–29)
DEPRECATED NEUTROPHILS # BLD AUTO: 10.3 10*3/UL (ref 2.1–6.9)
EGFRCR SERPLBLD CKD-EPI 2021: > 60 ML/MIN (ref 60–?)
EOSINOPHIL # BLD AUTO: 0.1 10*3/UL (ref 0–0.4)
EOSINOPHIL NFR BLD AUTO: 0.8 % (ref 0–6)
ERYTHROCYTE [DISTWIDTH] IN CORD BLOOD: 13.9 % (ref 11.7–14.4)
GLUCOSE SERPLBLD-MCNC: 70 MG/DL (ref 74–118)
HCT VFR BLD AUTO: 33.5 % (ref 38.2–49.6)
HGB BLD-MCNC: 9.9 G/DL (ref 14–18)
LYMPHOCYTES # BLD: 0.4 10*3/UL (ref 1–3.2)
LYMPHOCYTES NFR BLD AUTO: 3.5 % (ref 18–39.1)
MCH RBC QN AUTO: 28 PG (ref 28–32)
MCHC RBC AUTO-ENTMCNC: 29.6 G/DL (ref 31–35)
MCV RBC AUTO: 94.6 FL (ref 81–99)
MONOCYTES # BLD AUTO: 0.9 10*3/UL (ref 0.2–0.8)
MONOCYTES NFR BLD AUTO: 7.8 % (ref 4.4–11.3)
NEUTS SEG NFR BLD AUTO: 86.8 % (ref 38.7–80)
PLATELET # BLD AUTO: 195 X10E3/UL (ref 140–360)
POTASSIUM SERPL-SCNC: 3.8 MMOL/L (ref 3.5–5.1)
RBC # BLD AUTO: 3.54 X10E6/UL (ref 4.3–5.7)
SODIUM SERPL-SCNC: 144 MMOL/L (ref 136–145)

## 2020-09-04 RX ADMIN — DOCUSATE SODIUM SCH MG: 100 TABLET, FILM COATED ORAL at 17:00

## 2020-09-04 RX ADMIN — FAMOTIDINE SCH MG: 10 INJECTION, SOLUTION INTRAVENOUS at 09:00

## 2020-09-04 RX ADMIN — DOCUSATE SODIUM SCH MG: 100 TABLET, FILM COATED ORAL at 09:00

## 2020-09-04 RX ADMIN — SODIUM CHLORIDE SCH MLS/HR: 9 INJECTION, SOLUTION INTRAVENOUS at 00:13

## 2020-09-04 RX ADMIN — FAMOTIDINE SCH MG: 10 INJECTION, SOLUTION INTRAVENOUS at 17:02

## 2020-09-04 RX ADMIN — SODIUM CHLORIDE SCH MLS/HR: 9 INJECTION, SOLUTION INTRAVENOUS at 23:00

## 2020-09-04 RX ADMIN — SODIUM CHLORIDE SCH MLS/HR: 9 INJECTION, SOLUTION INTRAVENOUS at 06:19

## 2020-09-04 RX ADMIN — FAMOTIDINE SCH MG: 10 INJECTION, SOLUTION INTRAVENOUS at 09:41

## 2020-09-04 NOTE — NUR
PATIENT IN BED RESTING WITH NO S/S OF DISTRESS. MOUTH CARE PROVIDED. REMAINS NPO FOR 
PROCEDURES. BED IN LOWER POSITION, CALL LIGHT AT REACH.

## 2020-09-04 NOTE — NUR
Nutrition Intervention Note



RD Recommendation(s) for Physician: 

-Recommend Jevity 1.5 @ 237 mL 5x/day 

-Water flush of 65 mL before and after each feeding or per MD

(provides 1778 kcal, 75 g protein, and 1550 mL water)

The patient meets criteria for unspecified SEVERE protein-calorie malnutrition



Plan of Care: RD following, monitoring for tolerance and adequacy, tube feed recommendation



Nutrition reason for involvement: consult for bolus tube feeding recommendation



RD Assessment

(9/4/20) Pt is a 74 year old male admitted with CARINA. Per chart, pt is planned to have an EGD 
and PEG tube placement. RD was consulted for bolus tube feed recommendations. Pt stated he 
was eating <50% of meals for the past couple of weeks prior to admission. Pt also stated he 
had lost 20 lbs but did not specify timeframe of weight loss and was unable to recall his 
usual weight. Pt showed signs of moderate/severe muscle and fat depletion at time of visit. 
Recommendations provided. Will continue to monitor.



Principal Problems/Diagnoses: CARINA



PMH: Lung cancer, COPD, hypertension, coronary artery disease



GI: soft/non-tender abdomen



Skin: intact



Labs: (9/4) Na 144, K 3.8, BUN 16, Cr 0.83, Glu 70, Ca 9.0 



Meds: pepcid, colace, dilaudid, miralax, zofran



Ht: 70 inches

Wt: 111.5 lbs

BMI: 16.0 kg/m2

IBW: 166 lbs





Malnutrition Evaluation (9/4/20)

The patient meets criteria for unspecified SEVERE protein-calorie malnutrition. 



Energy intake: <50% of estimated energy requirements for >5 days



Weight loss: unable to assess



Fat loss: moderate/ severe - tricep

Muscle loss: moderate/severe  temporal and shoulder region



Supporting Evidence:

Fluid accumulation:  unable to evaluate

Functional Status: unable to evaluate



Nutrition Prescription (Diet Order): NPO



Estimated Nutritional Needs:

Calories: 7820-2351 calories/day (30-35 kcal/kg CBW)

Protein:  g protein/day (1.5-2 g pro/kg CBW)

Fluid: 0712-1351 mL/day (30-35 mL/kg CBW)



Diet Adequacy: Pt is NPO



Tolerance: N/A 





Diet Education Needs Assessment: Diet education not indicated



Nutrition Care Level: high 



Nutrition Diagnosis: Severe protein-calorie malnutrition related to h/o inadequate energy 
intake as evidenced by pt meeting <50% of estimated energy needs for > 5 days and 
moderate/severe muscle and fat depletion.





Goal: Patient will meet % of estimated needs by follow up 



Progress: N/A



Interventions:

-Composition, Rate, Route, Collaboration with other providers



Monitoring/Evaluation:

-Total energy intake, Total protein intake, Formula/Solution, Weight change





Signed: Shanna Roy RD, LD

## 2020-09-04 NOTE — NUR
CALL RECEIVED FROM RADIOLOGY STATING THAT THE PEG TUBE CAN ONLY BE PLACED ON TUESDAY, THE 
NURSE ANESTHESIOLOGIST IS GONE FOR THE DAY. NP NOTIFIED, NO NEW ORDER RECEIVED.

## 2020-09-04 NOTE — NUR
Spoke with Татьяна with [facility]. States they spoke with Chio with [facility] and was 
told [facility] will arrange tube feeds. 



Maribell VANEGAS called [facility] and confirmed that they will be arranging tube feeds for pt.

## 2020-09-04 NOTE — NUR
PATIENT BACK TO UNIT FROM OR. UNABLE TO DO THE PROCEDURE. DR FAJARDO NP IN TO SEE PATIENT. 
NEW ORDER RECEIVED TO CONSULT IR. PATIENT REQUESTED AND RECEIVED A POPSICLE. IN BED WITH 
CALL LIGHT AT REACH.

## 2020-09-04 NOTE — NUR
Spoke to pt at bedside regarding tube feeds for home. Pt states he doesn't care who we send 
order to. Choice letter signed for Annette. Copy of letter given to pt. 



Tube feed order faxed to [facility] at [phone] / [phone]. 

Татьяна with [facility] was notified of referral and confirmed she has received order. 

Informed her that plan is for pt to discharge home with [facility]. 

Pt still pending peg placement.

## 2020-09-04 NOTE — NUR
PATIENT REFUSED LIMB ALERT TO BE PLACED TO RIGHT ARM STATING IT WILL DAMAGE MY SKIN. MD IN 
TO SEE PATIENT, NO NEW ORDER RECEIVED. CALL LIGHT AT REACH.

## 2020-09-04 NOTE — NUR
SPOKE EXTENSIVELY WITH PT AND STEPDAUGHTER VIA PHONE IN ROOM. SPOKE WITH PT IN ROOM WITH DR GUTIERREZ, PT HAS DECIDED TO CONTINUE WITH HOSPICE EVEN THOUGH IT IS AGAINST WHAT DR WANTS. 
FILED CHOICE IN CHART WILL FAX CLINICALS

## 2020-09-04 NOTE — NUR
Spoke with patient's wife (Dennise Haywood) over the phone and updated her on patient's 
current condition and status. Wife appreciated phone call and had no further questions.

## 2020-09-04 NOTE — NUR
SPOKE WITH YELENA WITH [facility] SHE GOT REFERRAL, SHE IS ON CALL TOMORROW. ONCE PT IS 
READY FOR DISCHARGE CALL HER [phone] AND SHE WILL SET UP TRANSPORT HOME. THIS NUMBER 
IS GOOD FOR SATURDAY ONLY/.

## 2020-09-05 VITALS — DIASTOLIC BLOOD PRESSURE: 60 MMHG | SYSTOLIC BLOOD PRESSURE: 148 MMHG

## 2020-09-05 VITALS — DIASTOLIC BLOOD PRESSURE: 69 MMHG | SYSTOLIC BLOOD PRESSURE: 145 MMHG

## 2020-09-05 VITALS — DIASTOLIC BLOOD PRESSURE: 68 MMHG | SYSTOLIC BLOOD PRESSURE: 157 MMHG

## 2020-09-05 VITALS — DIASTOLIC BLOOD PRESSURE: 62 MMHG | SYSTOLIC BLOOD PRESSURE: 157 MMHG

## 2020-09-05 VITALS — DIASTOLIC BLOOD PRESSURE: 77 MMHG | SYSTOLIC BLOOD PRESSURE: 166 MMHG

## 2020-09-05 VITALS — SYSTOLIC BLOOD PRESSURE: 142 MMHG | DIASTOLIC BLOOD PRESSURE: 59 MMHG

## 2020-09-05 VITALS — SYSTOLIC BLOOD PRESSURE: 148 MMHG | DIASTOLIC BLOOD PRESSURE: 60 MMHG

## 2020-09-05 VITALS — SYSTOLIC BLOOD PRESSURE: 143 MMHG | DIASTOLIC BLOOD PRESSURE: 59 MMHG

## 2020-09-05 LAB
ALBUMIN SERPL-MCNC: 2.8 G/DL (ref 3.5–5)
ALBUMIN/GLOB SERPL: 0.7 {RATIO} (ref 0.8–2)
ALP SERPL-CCNC: 69 IU/L (ref 40–150)
ALT SERPL-CCNC: 10 IU/L (ref 0–55)
ANION GAP SERPL CALC-SCNC: 17.7 MMOL/L (ref 8–16)
BASOPHILS # BLD AUTO: 0 10*3/UL (ref 0–0.1)
BASOPHILS NFR BLD AUTO: 0.3 % (ref 0–1)
BUN SERPL-MCNC: 12 MG/DL (ref 7–26)
BUN/CREAT SERPL: 16 (ref 6–25)
CALCIUM SERPL-MCNC: 9.2 MG/DL (ref 8.4–10.2)
CHLORIDE SERPL-SCNC: 112 MMOL/L (ref 98–107)
CO2 SERPL-SCNC: 18 MMOL/L (ref 22–29)
DEPRECATED APTT PLAS QN: 36.2 SECONDS (ref 23.8–35.5)
DEPRECATED INR PLAS: 1.35
DEPRECATED NEUTROPHILS # BLD AUTO: 12.6 10*3/UL (ref 2.1–6.9)
EGFRCR SERPLBLD CKD-EPI 2021: > 60 ML/MIN (ref 60–?)
EOSINOPHIL # BLD AUTO: 0.1 10*3/UL (ref 0–0.4)
EOSINOPHIL NFR BLD AUTO: 0.6 % (ref 0–6)
ERYTHROCYTE [DISTWIDTH] IN CORD BLOOD: 13.9 % (ref 11.7–14.4)
GLOBULIN PLAS-MCNC: 3.8 G/DL (ref 2.3–3.5)
GLUCOSE SERPLBLD-MCNC: 79 MG/DL (ref 74–118)
HCT VFR BLD AUTO: 35.6 % (ref 38.2–49.6)
HGB BLD-MCNC: 10.4 G/DL (ref 14–18)
LYMPHOCYTES # BLD: 0.6 10*3/UL (ref 1–3.2)
LYMPHOCYTES NFR BLD AUTO: 3.8 % (ref 18–39.1)
MAGNESIUM SERPL-MCNC: 2 MG/DL (ref 1.3–2.1)
MCH RBC QN AUTO: 27.7 PG (ref 28–32)
MCHC RBC AUTO-ENTMCNC: 29.2 G/DL (ref 31–35)
MCV RBC AUTO: 94.7 FL (ref 81–99)
MONOCYTES # BLD AUTO: 1 10*3/UL (ref 0.2–0.8)
MONOCYTES NFR BLD AUTO: 6.8 % (ref 4.4–11.3)
NEUTS SEG NFR BLD AUTO: 87.7 % (ref 38.7–80)
PLATELET # BLD AUTO: 229 X10E3/UL (ref 140–360)
POTASSIUM SERPL-SCNC: 3.7 MMOL/L (ref 3.5–5.1)
PROTHROMBIN TIME: 17.5 SECONDS (ref 11.9–14.5)
RBC # BLD AUTO: 3.76 X10E6/UL (ref 4.3–5.7)
SODIUM SERPL-SCNC: 144 MMOL/L (ref 136–145)

## 2020-09-05 RX ADMIN — FAMOTIDINE SCH MG: 10 INJECTION, SOLUTION INTRAVENOUS at 17:09

## 2020-09-05 RX ADMIN — METOCLOPRAMIDE SCH MG: 5 INJECTION, SOLUTION INTRAMUSCULAR; INTRAVENOUS at 17:09

## 2020-09-05 RX ADMIN — METOCLOPRAMIDE SCH MG: 5 INJECTION, SOLUTION INTRAMUSCULAR; INTRAVENOUS at 20:12

## 2020-09-05 RX ADMIN — SODIUM CHLORIDE SCH MLS/HR: 9 INJECTION, SOLUTION INTRAVENOUS at 06:01

## 2020-09-05 RX ADMIN — METOCLOPRAMIDE SCH MG: 5 INJECTION, SOLUTION INTRAMUSCULAR; INTRAVENOUS at 12:05

## 2020-09-05 RX ADMIN — FAMOTIDINE SCH MG: 10 INJECTION, SOLUTION INTRAVENOUS at 09:14

## 2020-09-05 RX ADMIN — DOCUSATE SODIUM SCH MG: 100 TABLET, FILM COATED ORAL at 17:00

## 2020-09-05 RX ADMIN — METOCLOPRAMIDE SCH MG: 5 INJECTION, SOLUTION INTRAMUSCULAR; INTRAVENOUS at 08:10

## 2020-09-05 RX ADMIN — DOCUSATE SODIUM SCH MG: 100 TABLET, FILM COATED ORAL at 09:00

## 2020-09-05 NOTE — PROGRESS NOTE
DATE:  09/05/2020  

 

__________

 

SUBJECTIVE:  Ridge Haywood is a 74-year-old male patient of mine for 8 years with

lung cancer, recently diagnosed as cancer of the esophagus, inoperable because of

__________ lung cancer.  The patient was treated with radiation chemotherapy.  Dr. Cook had called me about possibility of gastrotomy, which I agree with.  The patient

will be treated by me on a palliative fashion only consisting of antiemetics,

analgesics.  The patient clinically is stable with a decent hemoglobin of 10.4, white

count of 14,370, and platelets of 229,000.  The patient's chemistry also remains

reasonable with a sodium of 144, potassium 3.7, chloride 112, CO2 18, total protein 6.6,

albumin low at 2.8, globulin 3.8.  Imaging has been soft tissue of the neck CAT scan is

6.3 x 3.2 x 2.6 cm enhancing soft tissue lesion in the hypopharynx and cervical

esophagus that occludes the esophagus.  This is dated 09/02/2020.  However, the patient

had received radiation and systemic chemotherapy.  Multiple pulmonary nodules at this

time suggestive of metastases, lytic and sclerotic lesions of the cervical spine C3-C7.

The patient is terminally ill.  However, the family situation is such that the patient

will not be taken care off.  I will leave decision of hospice up to the patient and the

wife as he has no other person who will take care of him. 

 

Thank you very much for allowing me to participate in management of this patient.

 

 

 

 

______________________________

MD PRAKASH Suresh/ROSSL

D:  09/05/2020 11:57:56

T:  09/05/2020 12:18:11

Job #:  [id number]

## 2020-09-05 NOTE — NUR
PATIENT IN BED RESTING WITH EYES CLOSED, NO DISTRESS NOTED. TELEMETRY BOX 34 IN PLACE. BED 
IN LOWER POSITION, CALL LIGHT AT REACH.

## 2020-09-05 NOTE — NUR
Patient visited in room during nursing rounds. Patient alert and oriented x3. Ambulates with 
standby assist using walker prn and when using bathroom prn. Patient denies any discomfort 
or pain at this time. Patient noted to spit saliva mixed with blood due to throat CA. On IVF 
(NS at 50ml/hr). Limb alert on right arm due to history of right breast cancer. Call bell 
within reach. Will monitor closely.

## 2020-09-06 VITALS — SYSTOLIC BLOOD PRESSURE: 147 MMHG | DIASTOLIC BLOOD PRESSURE: 57 MMHG

## 2020-09-06 VITALS — SYSTOLIC BLOOD PRESSURE: 163 MMHG | DIASTOLIC BLOOD PRESSURE: 60 MMHG

## 2020-09-06 VITALS — DIASTOLIC BLOOD PRESSURE: 56 MMHG | SYSTOLIC BLOOD PRESSURE: 142 MMHG

## 2020-09-06 VITALS — DIASTOLIC BLOOD PRESSURE: 60 MMHG | SYSTOLIC BLOOD PRESSURE: 163 MMHG

## 2020-09-06 VITALS — SYSTOLIC BLOOD PRESSURE: 123 MMHG | DIASTOLIC BLOOD PRESSURE: 51 MMHG

## 2020-09-06 VITALS — DIASTOLIC BLOOD PRESSURE: 51 MMHG | SYSTOLIC BLOOD PRESSURE: 123 MMHG

## 2020-09-06 VITALS — SYSTOLIC BLOOD PRESSURE: 116 MMHG | DIASTOLIC BLOOD PRESSURE: 76 MMHG

## 2020-09-06 RX ADMIN — DOCUSATE SODIUM SCH MG: 100 TABLET, FILM COATED ORAL at 09:00

## 2020-09-06 RX ADMIN — SODIUM CHLORIDE PRN MG: 900 INJECTION INTRAVENOUS at 13:10

## 2020-09-06 RX ADMIN — SODIUM CHLORIDE SCH MLS/HR: 9 INJECTION, SOLUTION INTRAVENOUS at 21:28

## 2020-09-06 RX ADMIN — HYDROMORPHONE HYDROCHLORIDE PRN MG: 1 INJECTION, SOLUTION INTRAMUSCULAR; INTRAVENOUS; SUBCUTANEOUS at 13:10

## 2020-09-06 RX ADMIN — METOCLOPRAMIDE SCH MG: 5 INJECTION, SOLUTION INTRAMUSCULAR; INTRAVENOUS at 21:25

## 2020-09-06 RX ADMIN — FAMOTIDINE SCH MG: 10 INJECTION, SOLUTION INTRAVENOUS at 09:09

## 2020-09-06 RX ADMIN — FAMOTIDINE SCH MG: 10 INJECTION, SOLUTION INTRAVENOUS at 17:06

## 2020-09-06 RX ADMIN — SODIUM CHLORIDE PRN MG: 900 INJECTION INTRAVENOUS at 19:30

## 2020-09-06 RX ADMIN — METOCLOPRAMIDE SCH MG: 5 INJECTION, SOLUTION INTRAMUSCULAR; INTRAVENOUS at 12:05

## 2020-09-06 RX ADMIN — SODIUM CHLORIDE PRN MG: 900 INJECTION INTRAVENOUS at 06:45

## 2020-09-06 RX ADMIN — DOCUSATE SODIUM SCH MG: 100 TABLET, FILM COATED ORAL at 17:00

## 2020-09-06 RX ADMIN — METOCLOPRAMIDE SCH MG: 5 INJECTION, SOLUTION INTRAMUSCULAR; INTRAVENOUS at 16:55

## 2020-09-06 RX ADMIN — SODIUM CHLORIDE SCH MLS/HR: 9 INJECTION, SOLUTION INTRAVENOUS at 02:04

## 2020-09-06 RX ADMIN — HYDROMORPHONE HYDROCHLORIDE PRN MG: 1 INJECTION, SOLUTION INTRAMUSCULAR; INTRAVENOUS; SUBCUTANEOUS at 06:45

## 2020-09-06 RX ADMIN — METOCLOPRAMIDE SCH MG: 5 INJECTION, SOLUTION INTRAMUSCULAR; INTRAVENOUS at 08:00

## 2020-09-06 RX ADMIN — HYDROMORPHONE HYDROCHLORIDE PRN MG: 1 INJECTION, SOLUTION INTRAMUSCULAR; INTRAVENOUS; SUBCUTANEOUS at 19:30

## 2020-09-06 NOTE — NUR
PATIENT ASSISTED TO THE RESTROOM AND BACK TO BED. REQUESTED AN RECEIVED A POPSICLE. BED IN 
LOWER POSITION, CALL LIGHT AT REACH.

## 2020-09-06 NOTE — NUR
PATIENT IN BED RESTING  WITH HEAD OF BED ELEVATED, NO DISTRESS NOTED. ALL PERSONAL ITEMS 
CLOSE TO PATIENT. BED IN LOWER POSITION, CALL LIGHT AT REACH.

## 2020-09-06 NOTE — CONSULTATION
DATE OF CONSULTATION:  09/06/2020  

 

HISTORY OF PRESENT ILLNESS:  Unfortunately, Mr. Haywood was found to have esophageal

cancer during the upper endoscopy that was done for evaluation of trouble swallowing.

He says that occurred in January 2020.  The patient underwent radiation treatment and

chemotherapy.  However, since they found another tumor unrelated to the esophageal tube,

he was considered a palliative therapy only.  For his cancer, I was asked to see him to

see if I can place a PEG tube in place.  The patient himself is asymptomatic GI wise

with minimal overall intake because of the cancer of esophagus in addition to extreme

weight loss.  As far as the patient's cancer is concerned, he is under the care of Dr. Hill. 

 

CURRENT MEDICATION:  In the hospital, Pepcid, Vistaril, hydralazine, Zofran and MiraLAX.

 

PAST MEDICAL HISTORY:  Hyperlipidemia, COPD, sleep apnea, and constipation.

 

PAST SURGICAL HISTORY:  He had mastectomy for breast cancer in the past right side,

bowel blockage, hernia repair, port inserted for chemotherapy in March of this year by

Dr. Wyatt. 

 

FAMILY HISTORY:  No family history of colon or stomach cancer.

 

SOCIAL HISTORY:  He is , has two kids.  He retired.  He does not drink and does

not smoke. 

 

ALLERGIES:  TO MORPHINE

 

REVIEW OF SYSTEMS:

Unremarkable except for trouble swallowing and weight loss.  Feeling nauseous and having

heartburn. 

 

IMPRESSION:  Squamous cell carcinoma of the esophagus.  I was asked to see the patient

for PEG tube placement.  We will try to place his PEG.  As I mentioned in my note, the

patient is currently receiving palliative care only, since they found unrelated tumor

also.  We will schedule upper endoscopy with possible PEG tube placement in the morning. 

 

 

 

 

______________________________

Kaleigh Cook MD RD/MODL

D:  09/06/2020 00:21:59

T:  09/06/2020 03:20:39

Job #:  [id number]

## 2020-09-06 NOTE — NUR
PATIENT C/O PAIN, WAS MEDICATED AS ORDERED. NO MORE C/O PAIN. IN BED RESTING WITH EYES 
CLOSED. CALL LIGHT AT REACH.

## 2020-09-07 VITALS — DIASTOLIC BLOOD PRESSURE: 37 MMHG | SYSTOLIC BLOOD PRESSURE: 109 MMHG

## 2020-09-07 VITALS — DIASTOLIC BLOOD PRESSURE: 52 MMHG | SYSTOLIC BLOOD PRESSURE: 120 MMHG

## 2020-09-07 VITALS — SYSTOLIC BLOOD PRESSURE: 115 MMHG | DIASTOLIC BLOOD PRESSURE: 38 MMHG

## 2020-09-07 VITALS — DIASTOLIC BLOOD PRESSURE: 38 MMHG | SYSTOLIC BLOOD PRESSURE: 115 MMHG

## 2020-09-07 VITALS — DIASTOLIC BLOOD PRESSURE: 46 MMHG | SYSTOLIC BLOOD PRESSURE: 98 MMHG

## 2020-09-07 VITALS — SYSTOLIC BLOOD PRESSURE: 117 MMHG | DIASTOLIC BLOOD PRESSURE: 42 MMHG

## 2020-09-07 VITALS — SYSTOLIC BLOOD PRESSURE: 128 MMHG | DIASTOLIC BLOOD PRESSURE: 59 MMHG

## 2020-09-07 LAB
ANION GAP SERPL CALC-SCNC: 17.4 MMOL/L (ref 8–16)
BASOPHILS # BLD AUTO: 0.1 10*3/UL (ref 0–0.1)
BASOPHILS NFR BLD AUTO: 0.4 % (ref 0–1)
BUN SERPL-MCNC: 10 MG/DL (ref 7–26)
BUN/CREAT SERPL: 14 (ref 6–25)
CALCIUM SERPL-MCNC: 8.9 MG/DL (ref 8.4–10.2)
CHLORIDE SERPL-SCNC: 111 MMOL/L (ref 98–107)
CO2 SERPL-SCNC: 21 MMOL/L (ref 22–29)
DEPRECATED NEUTROPHILS # BLD AUTO: 11 10*3/UL (ref 2.1–6.9)
EGFRCR SERPLBLD CKD-EPI 2021: > 60 ML/MIN (ref 60–?)
EOSINOPHIL # BLD AUTO: 0.1 10*3/UL (ref 0–0.4)
EOSINOPHIL NFR BLD AUTO: 0.9 % (ref 0–6)
ERYTHROCYTE [DISTWIDTH] IN CORD BLOOD: 14.2 % (ref 11.7–14.4)
GLUCOSE SERPLBLD-MCNC: 70 MG/DL (ref 74–118)
HCT VFR BLD AUTO: 32.6 % (ref 38.2–49.6)
HGB BLD-MCNC: 9.7 G/DL (ref 14–18)
LYMPHOCYTES # BLD: 0.4 10*3/UL (ref 1–3.2)
LYMPHOCYTES NFR BLD AUTO: 3.5 % (ref 18–39.1)
MCH RBC QN AUTO: 29.1 PG (ref 28–32)
MCHC RBC AUTO-ENTMCNC: 29.8 G/DL (ref 31–35)
MCV RBC AUTO: 97.9 FL (ref 81–99)
MONOCYTES # BLD AUTO: 1.1 10*3/UL (ref 0.2–0.8)
MONOCYTES NFR BLD AUTO: 8.7 % (ref 4.4–11.3)
NEUTS SEG NFR BLD AUTO: 85.9 % (ref 38.7–80)
PLATELET # BLD AUTO: 182 X10E3/UL (ref 140–360)
POTASSIUM SERPL-SCNC: 3.4 MMOL/L (ref 3.5–5.1)
RBC # BLD AUTO: 3.33 X10E6/UL (ref 4.3–5.7)
SODIUM SERPL-SCNC: 146 MMOL/L (ref 136–145)

## 2020-09-07 RX ADMIN — METOCLOPRAMIDE SCH MG: 5 INJECTION, SOLUTION INTRAMUSCULAR; INTRAVENOUS at 13:30

## 2020-09-07 RX ADMIN — METOCLOPRAMIDE SCH MG: 5 INJECTION, SOLUTION INTRAMUSCULAR; INTRAVENOUS at 17:42

## 2020-09-07 RX ADMIN — SODIUM CHLORIDE PRN MG: 900 INJECTION INTRAVENOUS at 18:35

## 2020-09-07 RX ADMIN — SODIUM CHLORIDE SCH MLS/HR: 9 INJECTION, SOLUTION INTRAVENOUS at 14:23

## 2020-09-07 RX ADMIN — SODIUM CHLORIDE PRN MG: 900 INJECTION INTRAVENOUS at 03:58

## 2020-09-07 RX ADMIN — HYDROMORPHONE HYDROCHLORIDE PRN MG: 1 INJECTION, SOLUTION INTRAMUSCULAR; INTRAVENOUS; SUBCUTANEOUS at 18:35

## 2020-09-07 RX ADMIN — FAMOTIDINE SCH MG: 10 INJECTION, SOLUTION INTRAVENOUS at 17:28

## 2020-09-07 RX ADMIN — METOCLOPRAMIDE SCH MG: 5 INJECTION, SOLUTION INTRAMUSCULAR; INTRAVENOUS at 13:00

## 2020-09-07 RX ADMIN — FAMOTIDINE SCH MG: 10 INJECTION, SOLUTION INTRAVENOUS at 09:45

## 2020-09-07 RX ADMIN — HYDROMORPHONE HYDROCHLORIDE PRN MG: 1 INJECTION, SOLUTION INTRAMUSCULAR; INTRAVENOUS; SUBCUTANEOUS at 12:15

## 2020-09-07 RX ADMIN — HYDROMORPHONE HYDROCHLORIDE PRN MG: 1 INJECTION, SOLUTION INTRAMUSCULAR; INTRAVENOUS; SUBCUTANEOUS at 03:58

## 2020-09-07 RX ADMIN — SODIUM CHLORIDE PRN MG: 900 INJECTION INTRAVENOUS at 12:15

## 2020-09-07 RX ADMIN — DOCUSATE SODIUM SCH MG: 100 TABLET, FILM COATED ORAL at 09:45

## 2020-09-07 RX ADMIN — METOCLOPRAMIDE SCH MG: 5 INJECTION, SOLUTION INTRAMUSCULAR; INTRAVENOUS at 09:45

## 2020-09-07 RX ADMIN — METOCLOPRAMIDE SCH MG: 5 INJECTION, SOLUTION INTRAMUSCULAR; INTRAVENOUS at 22:00

## 2020-09-07 RX ADMIN — DOCUSATE SODIUM SCH MG: 100 TABLET, FILM COATED ORAL at 16:16

## 2020-09-07 NOTE — NUR
Paged Dr. Cook to clarify of patient is going to have upper endoscopy with PEG tube 
placement tomorrow. Awaiting call back

## 2020-09-07 NOTE — NUR
Nutrition Intervention Note



RD Recommendation(s) for Physician: 

- Continue pureed diet as tolerated per MD and provide Ensure Enlive with meals as indicated

-When PEG is placed, recommend Jevity 1.5 @ 237 mL 5x/day and Water flush of 65 mL before 
and after each feeding or water flush per MD (provides 1778 kcal, 75 g protein, and 1550 mL 
water)

The patient meets criteria for unspecified SEVERE protein-calorie malnutrition



Plan of Care: RD following, monitoring for tolerance and adequacy, tube feed recommendation



Nutrition reason for involvement: follow up



RD Assessment

9/7: Follow up. Chart reviewed. MD note indicated pt was found to have esophageal cancer. Pt 
is awaiting PEG placement. Pt is currently on pureed diet and it is recorded pt consumed 
25-50% of meals yesterday. Will continue to monitor.



(9/4/20) Pt is a 74 year old male admitted with CARINA. Per chart, pt is planned to have an EGD 
and PEG tube placement. RD was consulted for bolus tube feed recommendations. Pt stated he 
was eating <50% of meals for the past couple of weeks prior to admission. Pt also stated he 
had lost 20 lbs but did not specify timeframe of weight loss and was unable to recall his 
usual weight. Pt showed signs of moderate/severe muscle and fat depletion at time of visit. 
Recommendations provided. Will continue to monitor.



Principal Problems/Diagnoses: CARINA



PMH: Lung cancer, COPD, hypertension, coronary artery disease



GI: soft/non-tender abdomen



Skin: intact



Labs: 9/7: Na 146, K 3.4, BUN 10, Cr 0.71, Glu 70, Ca 8.9

(9/4) Na 144, K 3.8, BUN 16, Cr 0.83, Glu 70, Ca 9.0 



Meds: reglan, dilaudid, pepcid, colace, hydralazine, miralax



Ht: 70 inches          

Wt: 111 lbs (9/5) 111.5 lbs (9/2)         

BMI: 16.0 kg/m2          

IBW: 166 lbs     





Malnutrition Evaluation (9/4/20)

The patient meets criteria for unspecified SEVERE protein-calorie malnutrition. 



Energy intake: <50% of estimated energy requirements for >5 days



Weight loss: unable to assess



Fat loss: moderate/ severe - tricep

Muscle loss: moderate/severe  temporal and shoulder region



Supporting Evidence:

Fluid accumulation:  unable to evaluate

Functional Status: unable to evaluate



Nutrition Prescription (Diet Order): cardiac/pureed



Estimated Nutritional Needs:

Calories: 0413-2426 calories/day (30-35 kcal/kg CBW)

Protein:  g protein/day (1.5-2 g pro/kg CBW)

Fluid: 3709-9240 mL/day (30-35 mL/kg CBW)



Diet Adequacy: not meeting calorie needs, not meeting protein needs



Tolerance: tolerating PO 





Diet Education Needs Assessment: Diet education not indicated



Nutrition Care Level: high 



Nutrition Diagnosis: Severe protein-calorie malnutrition related to h/o inadequate energy 
intake as evidenced by pt meeting <50% of estimated energy needs for > 5 days and 
moderate/severe muscle and fat depletion.





Goal: Patient will meet % of estimated needs by follow up 



Progress: goal not met



Interventions:

-Composition, Rate, Route, texture modification, Commercial beverage



Monitoring/Evaluation:

-Total energy intake, Total protein intake, Formula/Solution, Liquid supplement, Weight 
change





Signed: Shanna Roy RD, LD

## 2020-09-07 NOTE — CONSULTATION
DATE OF CONSULTATION:  09/04/2020  

 

Consultation to Dr. Cannon.

 

HISTORY OF PRESENT ILLNESS:  Ridge Haywood is a 74-year-old male whom I have treated

for approximately eight years for inoperable lung cancer with systemic chemotherapy and

later with DKI.  The patient recently had increasing disease.  Subsequently, a PET scan

was done, the PET scan showed a 2nd primary in the esophagus.  The patient had this

confirmed by EGD to be cancer of the esophagus.  The patient subsequently being

inoperable because of a 2nd malignancy which were lung cancer and the 3rd malignancy

which was cancer of the breast.  However, the 2nd malignancy, cancer of the lung, being

fairly active was treated palliatively with radiation chemotherapy.  The patient had

presented to the ER with weakness. 

 

SOCIAL HISTORY:  History of smoking in the past.

 

FAMILY HISTORY:  Noncontributory.

 

ALLERGIES:  NONE.

 

MEDICATIONS:  At this time:

1. Sodium chloride.

2. Tylenol.

3. Pepcid.

4. Hydralazine.

5. Hydromorphone.

6. Reglan.

7. Ondansetron.

8. Temazepam.

 

REVIEW OF SYSTEMS:

HEENT:  Normal. 

CARDIAC:  History of hypertension. 

RESPIRATORY:  Lung cancer approximately eight years back inoperable with metastases to

the spine. 

GI:  Cancer of the esophagus. 

:  Normal. 

MUSCULOSKELETAL:  Recent onset of cachexia. 

NEUROENDOCRINE:  Essentially normal.

 

PHYSICAL EXAMINATION:

GENERAL:  A cachectic male. 

NECK:  No palpable adenopathy. 

HEART:  Within normal limits. 

LUNGS:  Clear. 

RECTAL:  Deferred. 

CENTRAL NERVOUS SYSTEM:  Essentially normal. 

EXTREMITIES:  Essentially normal.

LABORATORY DATA:  Lab investigations of interest show a hemoglobin of 12.1, white count

of 14,820, platelets of 277,000.  Chemistry shows a sodium of 142, potassium of 5.1,

chloride 104, CO2 of 26, BUN 27, creatinine 1.47.  Total protein is high at 8.2, albumin

3.6, and globulin is 4.6.  Imaging shows the patient to have massive metastases to the

lung, left upper lobe mass 4.3 x 3.2 cm, mediastinal adenopathy of 4.3 x 3.4 cm.  The

patient has multiple metastatic deposits, the largest being 3.2 x 2.4 cm. 

 

IMPRESSION:  

1. Reactive lung cancer, squamous cell.

2. Cancer of the esophagus.

3. Lung metastases.

4. Cachexia.

 

PLAN, COMMENTS AND SUGGESTIONS:  Best supportive care is suggested to this person with

far advanced malignancy. 

 

The social background of this patient has to be taken into account for any decision

which will be made consisting of the life who is invalid and mostly bed ridden. 

 

A stepdaughter who does not live with the patient. 

 

Hospice definitely is one of the considerations.  However, a very close understanding is

needed so that he does not come back to the hospital once he just signed for hospice and

I discussed with him what hospice means, he backed off.  I did tell him hospice means

that is imminent to accept death.  He has not accepted the death. 

 

He also thinks that hospice will take care of him 24 hours a day. 

 

I suggest that very open discussion needs to be carried out with the patient as he will

come back to the emergency room if not explained everything about what hospice entails. 

 

Thank you very much for allowing me to participate in management of this patient during

this hospitalization.  The patient has terminal disease. 

 

 

 

 

______________________________

MD PRAKASH Suresh/GALLITO

D:  09/06/2020 10:07:42

T:  09/06/2020 15:14:40

Job #:  [id number]

## 2020-09-07 NOTE — NUR
Patient received lying in bed. AAO x 3. Patient had no complaints of pain. Respirations even 
and non-labored. IVF infusing at 50 cc/hr. Safety measures implemented. Patient instructed 
to call for assistance when needed. Call light within reach.

## 2020-09-07 NOTE — PROGRESS NOTE
DATE:  09/07/2020  

 

SUBJECTIVE:  Mr. Haywood is a 74-year-old male, referred to me for evaluation of

metastatic cancer of the lung, esophagus, and breast.  For detailed consult, please

review my consultation. 

 

Hematologically, he is stable with a hemoglobin of 9.7, white count of 12,715, platelets

of 182,000, dated 09/07/2020.  Chemistry also remains reasonable, however, the potassium

is slightly low at 3.4, we will leave it up to the attending. 

 

Gastrotomy could not be done by Dr. Cook because of findings described in the CAT

scan.  Interventional Radiology has being consulted for PEG tube. 

 

The patient has become extremely belligerent, cannot get off the phone and when I

entered the room, he said I am on the phone. 

 

This patient has terminal disease, I have dealt with three different malignancies

consisting of breast cancer, cancer of the lung, and cancer of the esophagus, extremely

nasty attitude.  I concur 

with hospice; however, it has to be noted that the family situation is dire and this

needs to be addressed if he will stay with hospice and the family is able to take care

of him with hospice. 

 

 

 

 

______________________________

MD PRAKASH Suresh/GALLITO

D:  09/07/2020 09:51:06

T:  09/07/2020 10:10:28

Job #:  [id number]

## 2020-09-07 NOTE — NUR
Dr. Cook paged to clarify if patient is having surgery  ( Peg tube placement)  in OR or 
IR. Awaiting call back.

## 2020-09-08 VITALS — SYSTOLIC BLOOD PRESSURE: 136 MMHG | DIASTOLIC BLOOD PRESSURE: 61 MMHG

## 2020-09-08 VITALS — DIASTOLIC BLOOD PRESSURE: 40 MMHG | SYSTOLIC BLOOD PRESSURE: 121 MMHG

## 2020-09-08 VITALS — SYSTOLIC BLOOD PRESSURE: 118 MMHG | DIASTOLIC BLOOD PRESSURE: 41 MMHG

## 2020-09-08 VITALS — SYSTOLIC BLOOD PRESSURE: 102 MMHG | DIASTOLIC BLOOD PRESSURE: 41 MMHG

## 2020-09-08 VITALS — DIASTOLIC BLOOD PRESSURE: 55 MMHG | SYSTOLIC BLOOD PRESSURE: 126 MMHG

## 2020-09-08 VITALS — SYSTOLIC BLOOD PRESSURE: 116 MMHG | DIASTOLIC BLOOD PRESSURE: 44 MMHG

## 2020-09-08 VITALS — SYSTOLIC BLOOD PRESSURE: 126 MMHG | DIASTOLIC BLOOD PRESSURE: 46 MMHG

## 2020-09-08 LAB
ALBUMIN SERPL-MCNC: 2.5 G/DL (ref 3.5–5)
ALBUMIN/GLOB SERPL: 0.6 {RATIO} (ref 0.8–2)
ALP SERPL-CCNC: 81 IU/L (ref 40–150)
ALT SERPL-CCNC: 9 IU/L (ref 0–55)
ANION GAP SERPL CALC-SCNC: 19.8 MMOL/L (ref 8–16)
BASOPHILS # BLD AUTO: 0 10*3/UL (ref 0–0.1)
BASOPHILS NFR BLD AUTO: 0.3 % (ref 0–1)
BUN SERPL-MCNC: 10 MG/DL (ref 7–26)
BUN/CREAT SERPL: 14 (ref 6–25)
CALCIUM SERPL-MCNC: 8.8 MG/DL (ref 8.4–10.2)
CHLORIDE SERPL-SCNC: 112 MMOL/L (ref 98–107)
CO2 SERPL-SCNC: 15 MMOL/L (ref 22–29)
DEPRECATED NEUTROPHILS # BLD AUTO: 11.4 10*3/UL (ref 2.1–6.9)
EGFRCR SERPLBLD CKD-EPI 2021: > 60 ML/MIN (ref 60–?)
EOSINOPHIL # BLD AUTO: 0.1 10*3/UL (ref 0–0.4)
EOSINOPHIL NFR BLD AUTO: 0.7 % (ref 0–6)
ERYTHROCYTE [DISTWIDTH] IN CORD BLOOD: 14.1 % (ref 11.7–14.4)
GLOBULIN PLAS-MCNC: 3.9 G/DL (ref 2.3–3.5)
GLUCOSE SERPLBLD-MCNC: 60 MG/DL (ref 74–118)
HCT VFR BLD AUTO: 37.5 % (ref 38.2–49.6)
HGB BLD-MCNC: 10.8 G/DL (ref 14–18)
LYMPHOCYTES # BLD: 0.6 10*3/UL (ref 1–3.2)
LYMPHOCYTES NFR BLD AUTO: 4.4 % (ref 18–39.1)
MCH RBC QN AUTO: 27.8 PG (ref 28–32)
MCHC RBC AUTO-ENTMCNC: 28.8 G/DL (ref 31–35)
MCV RBC AUTO: 96.6 FL (ref 81–99)
MONOCYTES # BLD AUTO: 1.2 10*3/UL (ref 0.2–0.8)
MONOCYTES NFR BLD AUTO: 8.9 % (ref 4.4–11.3)
NEUTS SEG NFR BLD AUTO: 84.9 % (ref 38.7–80)
PLATELET # BLD AUTO: 207 X10E3/UL (ref 140–360)
POTASSIUM SERPL-SCNC: 3.8 MMOL/L (ref 3.5–5.1)
RBC # BLD AUTO: 3.88 X10E6/UL (ref 4.3–5.7)
SODIUM SERPL-SCNC: 143 MMOL/L (ref 136–145)

## 2020-09-08 PROCEDURE — 0DH63UZ INSERTION OF FEEDING DEVICE INTO STOMACH, PERCUTANEOUS APPROACH: ICD-10-PCS | Performed by: INTERNAL MEDICINE

## 2020-09-08 PROCEDURE — BD12ZZZ FLUOROSCOPY OF STOMACH: ICD-10-PCS | Performed by: INTERNAL MEDICINE

## 2020-09-08 RX ADMIN — HYDROMORPHONE HYDROCHLORIDE PRN MG: 1 INJECTION, SOLUTION INTRAMUSCULAR; INTRAVENOUS; SUBCUTANEOUS at 18:00

## 2020-09-08 RX ADMIN — DOCUSATE SODIUM SCH MG: 100 TABLET, FILM COATED ORAL at 17:00

## 2020-09-08 RX ADMIN — HYDROMORPHONE HYDROCHLORIDE PRN MG: 1 INJECTION, SOLUTION INTRAMUSCULAR; INTRAVENOUS; SUBCUTANEOUS at 00:49

## 2020-09-08 RX ADMIN — HYDROMORPHONE HYDROCHLORIDE PRN MG: 1 INJECTION, SOLUTION INTRAMUSCULAR; INTRAVENOUS; SUBCUTANEOUS at 06:55

## 2020-09-08 RX ADMIN — SODIUM CHLORIDE PRN MG: 900 INJECTION INTRAVENOUS at 16:00

## 2020-09-08 RX ADMIN — FAMOTIDINE SCH MG: 10 INJECTION, SOLUTION INTRAVENOUS at 09:40

## 2020-09-08 RX ADMIN — SODIUM CHLORIDE SCH MLS/HR: 9 INJECTION, SOLUTION INTRAVENOUS at 18:13

## 2020-09-08 RX ADMIN — DOCUSATE SODIUM SCH MG: 100 TABLET, FILM COATED ORAL at 09:00

## 2020-09-08 RX ADMIN — FAMOTIDINE SCH MG: 10 INJECTION, SOLUTION INTRAVENOUS at 17:47

## 2020-09-08 NOTE — DIAGNOSTIC IMAGING REPORT
PROCEDURE: Gastrostomy tube placement



Procedural Personnel

Attending physician(s): Jesse Robles MD

Fellow physician(s): None

Resident physician(s): None

Advanced practice provider(s): None



Pre-procedure diagnosis: Esophageal malignancy

Post-procedure diagnosis: Same

Indication: Nutritional support

Additional clinical history: None



Complications: No immediate complications.



IMPRESSION:



Percutaneous placement of 14 [place] of [place] push-type gastrostomy tube.



Plan: 

Tube can be used for feeds starting at 8am tomorrow. 

_______________________________________________________________



PROCEDURE SUMMARY:

- Gastrostomy tube placement under fluoroscopic guidance

- Additional procedure(s): None



PROCEDURE DETAILS:



Pre-procedure

Consent: Informed consent for the procedure including risks, benefits and

alternatives was obtained and time-out was performed prior to the procedure.

Preparation: The site was prepared and draped using maximal sterile barrier

technique including cutaneous antisepsis.



Anesthesia/sedation

Level of anesthesia/sedation: Moderate sedation (conscious sedation) 1mg Versed

50mcg fentanyl

Anesthesia/sedation administered by: Independent trained observer under

attending supervision with continuous monitoring of the patient?s level of

consciousness and physiologic status

Total intra-service sedation time (minutes): 60



Gastrostomy tube placement

The liver margin was not localized by ultrasound. A catheter was passed through

the nose and into the stomach under fluoroscopic guidance. Local anesthesia was

administered. The stomach was inflated with air and judged appropriate for

access. 2 T-fasteners were placed under fluoroscopic guidance. The stomach was

accessed and a wire was placed. The tract was dilated, the gastrostomy tube was

advanced into the stomach, and intragastric position was confirmed with

contrast injection. The tube was capped.

Gastrostomy tube placed: 14Fr Uresil

Internal catheter securement: [place] loop

External catheter securement: Non-absorbable suture



Contrast

Contrast agent: Isovue 370

Contrast volume (mL): 10



Radiation Dose

Fluoroscopy time (minutes): 6.0  

Reference air kerma (mGy): 36.9 



Additional Details

Additional description of procedure: None

Equipment details: None

Specimens removed: None

Estimated blood loss (mL): Less than 10

Standardized report: [email or web address]



Attestation

Signer name: Jesse Robles MD

I attest that I was present for the entire procedure. I reviewed the stored

images and agree with the report as written.



Signed by: Jesse Robles MD on 9/8/2020 4:53 PM

## 2020-09-08 NOTE — NUR
Has c/o pain 7/10.notified to np Odalys Chua.received new orders.charge nurse  started new 
iv to right hand.keep monitor the patient.

## 2020-09-08 NOTE — DIAGNOSTIC IMAGING REPORT
PROCEDURE: Gastrostomy tube placement



Procedural Personnel

Attending physician(s): Jesse Robles MD

Fellow physician(s): None

Resident physician(s): None

Advanced practice provider(s): None



Pre-procedure diagnosis: Esophageal malignancy

Post-procedure diagnosis: Same

Indication: Nutritional support

Additional clinical history: None



Complications: No immediate complications.



IMPRESSION:



Percutaneous placement of 14 [place] push-type gastrostomy tube.



Plan: 

Tube can be used for feeds starting at 8am tomorrow. 

_______________________________________________________________



PROCEDURE SUMMARY:

- Gastrostomy tube placement under fluoroscopic guidance

- Additional procedure(s): None



PROCEDURE DETAILS:



Pre-procedure

Consent: Informed consent for the procedure including risks, benefits and

alternatives was obtained and time-out was performed prior to the procedure.

Preparation: The site was prepared and draped using maximal sterile barrier

technique including cutaneous antisepsis.



Anesthesia/sedation

Level of anesthesia/sedation: Moderate sedation (conscious sedation) 1mg Versed

50mcg fentanyl

Anesthesia/sedation administered by: Independent trained observer under

attending supervision with continuous monitoring of the patient?s level of

consciousness and physiologic status

Total intra-service sedation time (minutes): 60



Gastrostomy tube placement

The liver margin was not localized by ultrasound. A catheter was passed through

the nose and into the stomach under fluoroscopic guidance. Local anesthesia was

administered. The stomach was inflated with air and judged appropriate for

access. 2 T-fasteners were placed under fluoroscopic guidance. The stomach was

accessed and a wire was placed. The tract was dilated, the gastrostomy tube was

advanced into the stomach, and intragastric position was confirmed with

contrast injection. The tube was capped.

Gastrostomy tube placed: 14Fr Uresil

Internal catheter securement: [place] loop

External catheter securement: Non-absorbable suture



Contrast

Contrast agent: Isovue 370

Contrast volume (mL): 10



Radiation Dose

Fluoroscopy time (minutes): 6.0  

Reference air kerma (mGy): 36.9 



Additional Details

Additional description of procedure: None

Equipment details: None

Specimens removed: None

Estimated blood loss (mL): Less than 10

Standardized report: [email or web address]_v3



Attestation

Signer name: Jesse Robles MD

I attest that I was present for the entire procedure. I reviewed the stored

images and agree with the report as written.



Signed by: Jesse Robles MD on 9/8/2020 4:53 PM

## 2020-09-09 VITALS — DIASTOLIC BLOOD PRESSURE: 66 MMHG | SYSTOLIC BLOOD PRESSURE: 132 MMHG

## 2020-09-09 VITALS — SYSTOLIC BLOOD PRESSURE: 140 MMHG | DIASTOLIC BLOOD PRESSURE: 61 MMHG

## 2020-09-09 VITALS — DIASTOLIC BLOOD PRESSURE: 56 MMHG | SYSTOLIC BLOOD PRESSURE: 122 MMHG

## 2020-09-09 VITALS — SYSTOLIC BLOOD PRESSURE: 91 MMHG | DIASTOLIC BLOOD PRESSURE: 36 MMHG

## 2020-09-09 VITALS — DIASTOLIC BLOOD PRESSURE: 61 MMHG | SYSTOLIC BLOOD PRESSURE: 140 MMHG

## 2020-09-09 VITALS — SYSTOLIC BLOOD PRESSURE: 114 MMHG | DIASTOLIC BLOOD PRESSURE: 34 MMHG

## 2020-09-09 LAB
ALBUMIN SERPL-MCNC: 1.9 G/DL (ref 3.5–5)
ALBUMIN/GLOB SERPL: 0.7 {RATIO} (ref 0.8–2)
ALP SERPL-CCNC: 53 IU/L (ref 40–150)
ALT SERPL-CCNC: 7 IU/L (ref 0–55)
ANION GAP SERPL CALC-SCNC: 13.7 MMOL/L (ref 8–16)
ANION GAP SERPL CALC-SCNC: 16.4 MMOL/L (ref 8–16)
BASOPHILS # BLD AUTO: 0 10*3/UL (ref 0–0.1)
BASOPHILS # BLD AUTO: 0 10*3/UL (ref 0–0.1)
BASOPHILS NFR BLD AUTO: 0.3 % (ref 0–1)
BASOPHILS NFR BLD AUTO: 0.3 % (ref 0–1)
BUN SERPL-MCNC: 12 MG/DL (ref 7–26)
BUN SERPL-MCNC: 9 MG/DL (ref 7–26)
BUN/CREAT SERPL: 15 (ref 6–25)
BUN/CREAT SERPL: 16 (ref 6–25)
CALCIUM SERPL-MCNC: 7 MG/DL (ref 8.4–10.2)
CALCIUM SERPL-MCNC: 8.8 MG/DL (ref 8.4–10.2)
CHLORIDE SERPL-SCNC: 115 MMOL/L (ref 98–107)
CHLORIDE SERPL-SCNC: 121 MMOL/L (ref 98–107)
CO2 SERPL-SCNC: 17 MMOL/L (ref 22–29)
CO2 SERPL-SCNC: 20 MMOL/L (ref 22–29)
DEPRECATED NEUTROPHILS # BLD AUTO: 7.9 10*3/UL (ref 2.1–6.9)
DEPRECATED NEUTROPHILS # BLD AUTO: 9.5 10*3/UL (ref 2.1–6.9)
EGFRCR SERPLBLD CKD-EPI 2021: > 60 ML/MIN (ref 60–?)
EGFRCR SERPLBLD CKD-EPI 2021: > 60 ML/MIN (ref 60–?)
EOSINOPHIL # BLD AUTO: 0 10*3/UL (ref 0–0.4)
EOSINOPHIL # BLD AUTO: 0.1 10*3/UL (ref 0–0.4)
EOSINOPHIL NFR BLD AUTO: 0.4 % (ref 0–6)
EOSINOPHIL NFR BLD AUTO: 0.9 % (ref 0–6)
ERYTHROCYTE [DISTWIDTH] IN CORD BLOOD: 14.1 % (ref 11.7–14.4)
ERYTHROCYTE [DISTWIDTH] IN CORD BLOOD: 14.4 % (ref 11.7–14.4)
GLOBULIN PLAS-MCNC: 2.7 G/DL (ref 2.3–3.5)
GLUCOSE SERPLBLD-MCNC: 144 MG/DL (ref 74–118)
GLUCOSE SERPLBLD-MCNC: 58 MG/DL (ref 74–118)
HCT VFR BLD AUTO: 30.7 % (ref 38.2–49.6)
HCT VFR BLD AUTO: 32.3 % (ref 38.2–49.6)
HGB BLD-MCNC: 8.8 G/DL (ref 14–18)
HGB BLD-MCNC: 9.4 G/DL (ref 14–18)
LYMPHOCYTES # BLD: 0.4 10*3/UL (ref 1–3.2)
LYMPHOCYTES # BLD: 0.5 10*3/UL (ref 1–3.2)
LYMPHOCYTES NFR BLD AUTO: 3.7 % (ref 18–39.1)
LYMPHOCYTES NFR BLD AUTO: 5.4 % (ref 18–39.1)
MCH RBC QN AUTO: 27.5 PG (ref 28–32)
MCH RBC QN AUTO: 28.1 PG (ref 28–32)
MCHC RBC AUTO-ENTMCNC: 28.7 G/DL (ref 31–35)
MCHC RBC AUTO-ENTMCNC: 29.1 G/DL (ref 31–35)
MCV RBC AUTO: 95.9 FL (ref 81–99)
MCV RBC AUTO: 96.4 FL (ref 81–99)
MONOCYTES # BLD AUTO: 0.9 10*3/UL (ref 0.2–0.8)
MONOCYTES # BLD AUTO: 0.9 10*3/UL (ref 0.2–0.8)
MONOCYTES NFR BLD AUTO: 8 % (ref 4.4–11.3)
MONOCYTES NFR BLD AUTO: 9 % (ref 4.4–11.3)
NEUTS SEG NFR BLD AUTO: 83.8 % (ref 38.7–80)
NEUTS SEG NFR BLD AUTO: 87 % (ref 38.7–80)
PLATELET # BLD AUTO: 182 X10E3/UL (ref 140–360)
PLATELET # BLD AUTO: 195 X10E3/UL (ref 140–360)
POTASSIUM SERPL-SCNC: 2.7 MMOL/L (ref 3.5–5.1)
POTASSIUM SERPL-SCNC: 4.4 MMOL/L (ref 3.5–5.1)
RBC # BLD AUTO: 3.2 X10E6/UL (ref 4.3–5.7)
RBC # BLD AUTO: 3.35 X10E6/UL (ref 4.3–5.7)
SODIUM SERPL-SCNC: 147 MMOL/L (ref 136–145)
SODIUM SERPL-SCNC: 149 MMOL/L (ref 136–145)

## 2020-09-09 RX ADMIN — POTASSIUM CHLORIDE SCH MEQ: 1.5 SOLUTION ORAL at 13:06

## 2020-09-09 RX ADMIN — HYDROMORPHONE HYDROCHLORIDE PRN MG: 1 INJECTION, SOLUTION INTRAMUSCULAR; INTRAVENOUS; SUBCUTANEOUS at 09:30

## 2020-09-09 RX ADMIN — FAMOTIDINE SCH MG: 10 INJECTION, SOLUTION INTRAVENOUS at 10:49

## 2020-09-09 RX ADMIN — METOCLOPRAMIDE SCH MG: 5 INJECTION, SOLUTION INTRAMUSCULAR; INTRAVENOUS at 13:06

## 2020-09-09 RX ADMIN — POTASSIUM CHLORIDE SCH MEQ: 1.5 SOLUTION ORAL at 10:49

## 2020-09-09 RX ADMIN — METOCLOPRAMIDE SCH MG: 5 INJECTION, SOLUTION INTRAMUSCULAR; INTRAVENOUS at 08:30

## 2020-09-09 RX ADMIN — SODIUM CHLORIDE PRN MG: 900 INJECTION INTRAVENOUS at 02:59

## 2020-09-09 RX ADMIN — HYDROMORPHONE HYDROCHLORIDE PRN MG: 1 INJECTION, SOLUTION INTRAMUSCULAR; INTRAVENOUS; SUBCUTANEOUS at 15:50

## 2020-09-09 RX ADMIN — HYDROMORPHONE HYDROCHLORIDE PRN MG: 1 INJECTION, SOLUTION INTRAMUSCULAR; INTRAVENOUS; SUBCUTANEOUS at 03:00

## 2020-09-09 RX ADMIN — DOCUSATE SODIUM SCH MG: 100 TABLET, FILM COATED ORAL at 08:09

## 2020-09-09 NOTE — NUR
SEASONS CALLED AND STATES SINCE HE IS AMBULATORY INSURANCE WILL NOT PAY FOR TRANSPORT, 
CALLED AND SPOKE WITH STEP DAUGHTER SHE WILL COME PICK HIM UP AT NOON.

## 2020-09-09 NOTE — NUR
JEVITY 237 ML BOLUS ADMINISTERED VIA G-TUBE. PATIENT TOLERATED FEEDING WELL. NO DISTRESS 
NOTED. HOB ELEVATED.

## 2020-09-09 NOTE — NUR
ASSUMED CARE. AAOX3. ACYANOTIC. PATIENT RESTING IN BED. PATIENT RECEIVED 1 CUP OF ORANGE 
JUICE AFTER CRITICAL GLUCOSE OF 58. CALL LIGHT IN REACH. SIDE RAILS UP X2. BED LOW AND 
LOCKED.

## 2020-09-10 NOTE — DISCHARGE SUMMARY
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

cancer with METS, hypertension, history of COPD without acute exacerbation. 

 

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

cancer with METS, hypertension, history of COPD without acute exacerbation plus anemia

of chronic disease due to cancer, hypokalemia, hypoglycemia, hypernatremia, status post

PEG tube placement. 

 

HISTORY:  Lung cancer, COPD, CAD and hypertension.

 

PAST SURGICAL HISTORY:  Cholecystectomy and hernia repair.

 

FAMILY HISTORY:  Noncontributory.

 

SOCIAL HISTORY:  The patient is a former smoker.

 

HOSPITAL COURSE:  A 74-year-old male, admits with complaints of dysphagia and loss of

weight.  He is followed by Dr. Hill for cancer.  On admission CT of the chest showed

multiple bilateral large pulmonary nodules and masses concerning for metastatic disease,

severe emphysematous changes negative for effusion, pneumothorax or infectious

infiltrate, negative for central PE.  CT of the soft tissue of the neck showed

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

pulmonary nodules the largest of which is left upper lobe and measures 3.4 x 5.5, lytic

and sclerotic lesions of the cervical spine C3 through C7 are also concerning for

metastases.  Corona virus was negative.  Nutrition was consulted due to severe protein

calorie malnutrition.  Nutrition recommended Jevity 1.5 at 237 mL five times a day with

65 mL water flush before and after each feeding.  On 09/08/2020, the patient had a

14-[place] PEG tube placed, the following day feedings were started.  The patient

tolerated well and was instructed on how to do feeding himself at home.  After speaking

with palliative care and his hematologist/oncologist he decided to discharge home with

hospice as his wife in unable to care for him and he does not want any placement outside

of the home, so he will discharge today with hospice to his home.  The patient

understands discharge instructions and agrees to plan.  Vital signs stable, patient

afebrile. 

 

 

Dictated by Keri Delgado NP

 

______________________________

MD JORDY Deleon/MODL

D:  09/09/2020 16:14:07

T:  09/09/2020 18:53:40

Job #:  [id number]